# Patient Record
Sex: FEMALE | Race: WHITE | Employment: UNEMPLOYED | URBAN - METROPOLITAN AREA
[De-identification: names, ages, dates, MRNs, and addresses within clinical notes are randomized per-mention and may not be internally consistent; named-entity substitution may affect disease eponyms.]

---

## 2017-01-16 ENCOUNTER — TELEPHONE (OUTPATIENT)
Dept: FAMILY MEDICINE | Facility: CLINIC | Age: 62
End: 2017-01-16

## 2017-01-16 DIAGNOSIS — Z12.11 COLON CANCER SCREENING: Primary | ICD-10-CM

## 2017-01-16 NOTE — TELEPHONE ENCOUNTER
Panel Management Review      Patient has the following on her problem list:     Depression / Dysthymia review  PHQ-9 SCORE 6/5/2012 8/7/2012 6/23/2014   Total Score 12 16 8      Patient is due for:  PHQ9      Composite cancer screening  Chart review shows that this patient is due/due soon for the following Colonoscopy and Fecal Colorectal (FIT)  Summary:    Patient is due/failing the following:   COLONOSCOPY and FIT    Action needed:   Patient needs referral/order: GI referral placed.      Type of outreach:    NONE.  GI referral placed.  They will call patient.      Questions for provider review:    None                                                                                                                                    Lisa Magill, HERMILA

## 2017-03-01 ENCOUNTER — OFFICE VISIT (OUTPATIENT)
Dept: FAMILY MEDICINE | Facility: CLINIC | Age: 62
End: 2017-03-01
Payer: OTHER MISCELLANEOUS

## 2017-03-01 VITALS
HEIGHT: 66 IN | RESPIRATION RATE: 16 BRPM | BODY MASS INDEX: 38.57 KG/M2 | SYSTOLIC BLOOD PRESSURE: 108 MMHG | TEMPERATURE: 97.7 F | DIASTOLIC BLOOD PRESSURE: 62 MMHG | HEART RATE: 90 BPM | WEIGHT: 240 LBS | OXYGEN SATURATION: 96 %

## 2017-03-01 DIAGNOSIS — M54.42 ACUTE BILATERAL LOW BACK PAIN WITH LEFT-SIDED SCIATICA: Primary | ICD-10-CM

## 2017-03-01 PROCEDURE — 99213 OFFICE O/P EST LOW 20 MIN: CPT | Performed by: NURSE PRACTITIONER

## 2017-03-01 NOTE — PROGRESS NOTES
"HPI    SUBJECTIVE:                                                    Rich Terry is a 62 year old female who presents to clinic today for the following health issues:      Back Pain      Duration: started 2/23/17        Specific cause: fall , work-related    Description:   Location of pain: low back left and middle of back left  Character of pain: stabbing and constant  Pain radiation:radiates into the left buttocks and radiates into the left leg  New numbness or weakness in legs, not attributed to pain:  YES    Intensity: Currently 4-5/10, At its worst 10/10    History:   Pain interferes with job: YES  History of back problems: recurrent self limited episodes of low back pain in the past. Has not had back pain prior to this for a long time.   Any previous MRI or X-rays: None  Sees a specialist for back pain:  No  Therapies tried without relief: cold, heat and NSAIDs    Alleviating factors:   Improved by: none      Precipitating factors:  Worsened by: Bending    Functional and Psychosocial Screen (Riri STarT Back):      Most recent score:    RIRI START BACK TOTAL SCORE 3/1/2017   Total Score (all 9) 2          Accompanying Signs & Symptoms:  Risk of Fracture:  None  Risk of Cauda Equina:  None  Risk of Infection:  None  Risk of Cancer:  None  Risk of Ankylosing Spondylitis:  Onset at age <35, male, AND morning back stiffness. no          Patient also having pain in right arm. \"feels like pulled muscle\".  Works in a greenhouse.  Tripped over a board while in the greenhouse, fell, and landed on L side.  Tried ibuprofen and Aleve for 2 days; some improvement.  Has been working since the fall.  Working 11 days in a row, 8 hour days.  Work consists of lots of bending over and some heavy lifting.         Problem list and histories reviewed & adjusted, as indicated.  Additional history: as documented    No current outpatient prescriptions on file.     Allergies   Allergen Reactions     Levaquin      Shrimp      " "throat swells     Sulfa Drugs Rash     rash       Reviewed and updated as needed this visit by clinical staff       Reviewed and updated as needed this visit by Provider         ROS:  Constitutional, HEENT, cardiovascular, pulmonary, gi and gu systems are negative, except as otherwise noted.    OBJECTIVE:                                                    /62 (BP Location: Right arm, Patient Position: Chair, Cuff Size: Adult Large)  Pulse 90  Temp 97.7  F (36.5  C) (Oral)  Resp 16  Ht 5' 6\" (1.676 m)  Wt 240 lb (108.9 kg)  LMP 09/11/2005  SpO2 96%  BMI 38.74 kg/m2  Body mass index is 38.74 kg/(m^2).  GENERAL: healthy, alert and no distress  RESP: lungs clear to auscultation - no rales, rhonchi or wheezes  CV: regular rate and rhythm, normal S1 S2, no S3 or S4, no murmur, click or rub  MS: Back exam: no obvious deformities noted, normal muscle tone, normal range of motion, R and L paralumbar tenderness, normal straight leg raises, and gait normal including heel-toe walking  SKIN: no suspicious lesions or rashes  NEURO: Normal strength and tone, mentation intact and speech normal  PSYCH: mentation appears normal, affect normal/bright    Diagnostic Test Results:  none      ASSESSMENT/PLAN:                                                      1. Acute bilateral low back pain with left-sided sciatica  Rest, ice/heat, massage, tylenol/ibuprofen  - [facility], [facility] CH[facility]RACTIC REFERRAL    F/u in 1 wk; sooner as needed     DUTCH Polanco [facility]      ROS      Physical Exam      "

## 2017-03-01 NOTE — NURSING NOTE
"Chief Complaint   Patient presents with     Back Pain     Musculoskeletal Problem     right arm pain       Initial /62 (BP Location: Right arm, Patient Position: Chair, Cuff Size: Adult Large)  Pulse 90  Temp 97.7  F (36.5  C) (Oral)  Resp 16  Ht 5' 6\" (1.676 m)  Wt 240 lb (108.9 kg)  LMP 09/11/2005  SpO2 96%  BMI 38.74 kg/m2 Estimated body mass index is 38.74 kg/(m^2) as calculated from the following:    Height as of this encounter: 5' 6\" (1.676 m).    Weight as of this encounter: 240 lb (108.9 kg).  Medication Reconciliation: complete   Niya Abrams MA    "

## 2017-03-01 NOTE — MR AVS SNAPSHOT
After Visit Summary   3/1/2017    Rich Terry    MRN: [id number]           Patient Information     Date Of Birth          1955        Visit Information        Provider Department      3/1/2017 10:40 AM Brittny Fraire APRN CNP [facility]        Today's Diagnoses     Acute bilateral low back pain with left-sided sciatica    -  1       Follow-ups after your visit        Additional Services     [facility], [facility]RACT[facility] REFERRAL       **This order will print in the [facility] Scheduling Office**    Physical Therapy, Hand Therapy and Chiropractic Care are available through:    *[facility]  *[facility]  *[facility]    Call one number to schedule at any of the above locations: [phone].    Your provider has referred you to: Physical Therapy at [facility] or [facility]    Indication/Reason for Referral: Low Back Pain  Onset of Illness: 2/23/2017  Therapy Orders: Evaluate and Treat  Special Programs: None  Special Request: None    Riri Sub-Score (Q5-9): 1 (03/01/17 105)  Riri Total Score (all 9): 2 (03/01/17 5457)    Additional Comments for the Therapist or Chiropractor:     Please be aware that coverage of these services is subject to the terms and limitations of your health insurance plan.  Call member services at your health plan with any benefit or coverage questions.      Please bring the following to your appointment:    *Your personal calendar for scheduling future appointments  *Comfortable clothing                  Who to contact     If you have questions or need follow up information about today's clinic visit or your schedule please contact [facility] directly at [phone].  Normal or non-critical lab and imaging results will be communicated to you by MyChart, letter or phone within 4 business days after the clinic has received the results. If you do not hear from us within 7 days, please contact  "the clinic through [employer] or phone. If you have a critical or abnormal lab result, we will notify you by phone as soon as possible.  Submit refill requests through [employer] or call your pharmacy and they will forward the refill request to us. Please allow 3 business days for your refill to be completed.          Additional Information About Your Visit        [employer] Information     [employer] lets you send messages to your doctor, view your test results, renew your prescriptions, schedule appointments and more. To sign up, go to [email or web address] . Click on \"Log in\" on the left side of the screen, which will take you to the Welcome page. Then click on \"Sign up Now\" on the right side of the page.     You will be asked to enter the access code listed below, as well as some personal information. Please follow the directions to create your username and password.     Your access code is: [email or web address]  Expires: 2017 11:17 AM     Your access code will  in 90 days. If you need help or a new code, please call your [place] clinic or [phone].        Care EveryWhere ID     This is your Care EveryWhere ID. This could be used by other organizations to access your [place] medical records  [id number]        Your Vitals Were     Pulse Temperature Respirations Height Last Period Pulse Oximetry    90 97.7  F (36.5  C) (Oral) 16 5' 6\" (1.676 m) 2005 96%    BMI (Body Mass Index)                   38.74 kg/m2            Blood Pressure from Last 3 Encounters:   17 108/62   05/10/16 118/78   04/15/16 108/70    Weight from Last 3 Encounters:   17 240 lb (108.9 kg)   05/10/16 234 lb (106.1 kg)   04/15/16 237 lb (107.5 kg)              We Performed the Following     [facility], [facility]CT[facility] REFERRAL        Primary Care Provider Office Phone # Fax #    Ragini Stevens [phone]       [place] [address]  [address]   [facility] [address]        Thank you!     " Thank you for choosing [facility]  for your care. Our goal is always to provide you with excellent care. Hearing back from our patients is one way we can continue to improve our services. Please take a few minutes to complete the written survey that you may receive in the mail after your visit with us. Thank you!             Your Updated Medication List - Protect others around you: Learn how to safely use, store and throw away your medicines at www.disposemymeds.org.      Notice  As of 3/1/2017 11:17 AM    You have not been prescribed any medications.

## 2017-03-01 NOTE — LETTER
[address], [address]  [facility] [address]  Phone: [phone]  Fax: [phone]    March 1, 2017        Rich Terry  [address] [address]          To whom it may concern:    RE: Rich FELICITA Harrison    Patient may return to work with the following:  Light duty-unable to lift more than 10 pounds.      Please contact me for questions or concerns.      Sincerely,        DUTCH Polanco CNP

## 2017-03-01 NOTE — LETTER
[address], [address]  [facility] [address]  Phone: [phone]  Fax: [phone]    March 1, 2017        Rich Terry  [address] [address]          To whom it may concern:    RE: Rich Terry    Patient may return to work with the following:  Light duty-unable to lift more than 10 pounds for one week.        Sincerely,        DUTCH Polanco CNP

## 2017-03-09 ENCOUNTER — TELEPHONE (OUTPATIENT)
Dept: FAMILY MEDICINE | Facility: CLINIC | Age: 62
End: 2017-03-09

## 2017-03-09 ENCOUNTER — OFFICE VISIT (OUTPATIENT)
Dept: FAMILY MEDICINE | Facility: CLINIC | Age: 62
End: 2017-03-09
Payer: OTHER MISCELLANEOUS

## 2017-03-09 ENCOUNTER — RADIANT APPOINTMENT (OUTPATIENT)
Dept: GENERAL RADIOLOGY | Facility: CLINIC | Age: 62
End: 2017-03-09
Attending: FAMILY MEDICINE
Payer: OTHER MISCELLANEOUS

## 2017-03-09 VITALS
TEMPERATURE: 98.2 F | RESPIRATION RATE: 12 BRPM | BODY MASS INDEX: 38.48 KG/M2 | WEIGHT: 238.4 LBS | OXYGEN SATURATION: 96 % | DIASTOLIC BLOOD PRESSURE: 76 MMHG | HEART RATE: 80 BPM | SYSTOLIC BLOOD PRESSURE: 102 MMHG

## 2017-03-09 DIAGNOSIS — Z12.11 SPECIAL SCREENING FOR MALIGNANT NEOPLASMS, COLON: Primary | ICD-10-CM

## 2017-03-09 DIAGNOSIS — M54.42 ACUTE BILATERAL LOW BACK PAIN WITH LEFT-SIDED SCIATICA: Primary | ICD-10-CM

## 2017-03-09 DIAGNOSIS — M54.42 ACUTE BILATERAL LOW BACK PAIN WITH LEFT-SIDED SCIATICA: ICD-10-CM

## 2017-03-09 DIAGNOSIS — M79.621 PAIN OF RIGHT UPPER ARM: ICD-10-CM

## 2017-03-09 PROCEDURE — 99214 OFFICE O/P EST MOD 30 MIN: CPT | Performed by: FAMILY MEDICINE

## 2017-03-09 PROCEDURE — 72100 X-RAY EXAM L-S SPINE 2/3 VWS: CPT

## 2017-03-09 RX ORDER — PREDNISONE 20 MG/1
40 TABLET ORAL DAILY
Qty: 10 TABLET | Refills: 0 | Status: SHIPPED | OUTPATIENT
Start: 2017-03-09 | End: 2017-03-14

## 2017-03-09 RX ORDER — METHOCARBAMOL 750 MG/1
750 TABLET, FILM COATED ORAL 4 TIMES DAILY PRN
Qty: 60 TABLET | Refills: 1 | Status: SHIPPED | OUTPATIENT
Start: 2017-03-09 | End: 2017-07-10

## 2017-03-09 RX ORDER — HYDROCODONE BITARTRATE AND ACETAMINOPHEN 5; 325 MG/1; MG/1
1-2 TABLET ORAL EVERY 4 HOURS PRN
Qty: 30 TABLET | Refills: 0 | Status: SHIPPED | OUTPATIENT
Start: 2017-03-09 | End: 2017-04-06

## 2017-03-09 ASSESSMENT — PAIN SCALES - GENERAL: PAINLEVEL: MODERATE PAIN (4)

## 2017-03-09 NOTE — LETTER
REPORT OF WORK ABILITY    [facility]   [facility] [address], [address]  [facility] [id number] [phone]      PATIENT DATA    Employee Name: Rich Terry      : 1955     #: [id number]    Work related injury: Yes  Employer at time of injury: [employer]  Employer contact & phone:   Employed elsewhere? No  Workers' Compensation Carrier/Managed Care Plan:  unknown    Today's date: 3/9/2017  Date of injury: 17  Date of first visit: 3/1/17    PROVIDER EVALUATION: Please fill in as needed.  Please give copy to employee for employer.    1. Diagnosis: sciatica on the left and right arm pain    2. Treatment: medication, x-rays.  3. Medication: robaxin, prednisone, vicodin  NOTE: When ordering a medication, [place] Rules require Work Comp or WC on prescriptions.    4. No work from -n/a.  5. Return to work date: already    ** WITH RESTRICTIONS? Yes, with work restrictions: * Restricted lifting - Maximum lift in pounds: 50  * Restricted bending-bending limit : limit past 90 degrees.  Bending frequency (5 times per day)  DURATION OF LIMITATIONS: 17      RESTRICTIONS: Unlimited unless listed.  Restrictions apply to home and leisure also.  If work restrictions is not available, the employee is totally disabled.    Maximum Medical Improvement (Date): 17  Any Permanent Partial Disability? Deferred to future exam/consult.    Provider comments:     Medical Examiner: [facility], MD                License or registration: [id number]    Next appointment: 2 weeks    CC: Employer, Managed Care Plan/Payor, Patient

## 2017-03-09 NOTE — PROGRESS NOTES
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 Additionally, another coworker got injured on the job. She states she is retiring and her last day is on May 1st, but she may continue to work because they are requesting she stay on part-time.     Date of injury:     02/23/17    Injury location:    Right arm, low back, and left buttock.     Injury Event:  While she was working on 2/23/2017, she tripped on a loose board that was hanging off of a broken pallet that another coworker placed next to a bench, creating a narrow pathway that she needed to walk through. She tripped on that loose board and fell forward, injuring her right arm, lower back, and her left buttock. Although painful, she did not leave work early due to the injury, but when she complained of back pain, her employer told her to wait and see how she felt the next day. However, she has Fridays off, so she was unable to follow-up with her employer the next day. The following Tuesday, she went in and requested paperwork for work compensation. Her company has not replied to her claim yet.    Current Symptoms:  Always a pressure feeling in her left buttock. At times, she experiences a very sharp - almost like a stabbing pain - in her lower back after any gentle movements, especially while rotating her upper body. This sharp pain eventually becomes a dull ache. Although certain movements provokes her symptoms, she states she always has a numbness in the left buttock. She has tried using heat/cold therapy as well as Ibuprofen, but has found minimal relief.      Problem list and histories reviewed & adjusted, as indicated.  Additional history: as documented      BP Readings from Last 3 Encounters:    03/09/17 102/76   03/01/17 108/62   05/10/16 118/78    Wt Readings from Last 3 Encounters:   03/09/17 238 lb 6.4 oz (108.1 kg)   03/01/17 240 lb (108.9 kg)   05/10/16 234 lb (106.1 kg)         Labs reviewed in EPIC    ROS:  Constitutional, HEENT, cardiovascular, pulmonary, gi and gu systems are negative, except as otherwise noted.    This document serves as a record of the services and decisions personally performed and made by Ragini Stevens MD. It was created on her behalf by Sharon Patricia, a trained medical scribe. The creation of this document is based on the provider's statements to the medical scribe.  Sharon Patricia, 11:12 AM, March 9, 2017    OBJECTIVE:                                                    /76 (BP Location: Right arm, Patient Position: Chair, Cuff Size: Adult Large)  Pulse 80  Temp 98.2  F (36.8  C) (Oral)  Resp 12  Wt 238 lb 6.4 oz (108.1 kg)  LMP 09/11/2005  SpO2 96%  BMI 38.48 kg/m2  Body mass index is 38.48 kg/(m^2).    EXAM:  GENERAL: Patient appears healthy, alert and no distress  NECK: No adenopathy, no asymmetry, masses, or scars and thyroid normal to palpation  RESP: Lungs clear to auscultation - no rales, rhonchi or wheezes  CV: Regular rate and rhythm, normal S1 S2, no S3 or S4, no murmur, click or rub, no peripheral edema and peripheral pulses strong  ABDOMEN: Soft, nontender, no hepatosplenomegaly, no masses and bowel sounds normal  MS: Tenderness over the left piriformis and gluteus camryn muscles,Tenderness of left buttock with straight leg test; No edema or altered gait  Upper buttock pain also noted on palpation, near iliac crest  Reflexes are normal bilt, strength normal in LE and UE  Right arm examined and no pain to palpation, no other findings      Diagnostic Test Results:  No results found for this or any previous visit (from the past 24 hour(s)).     ASSESSMENT/PLAN:                                                    (M54.42) Acute bilateral low back pain with  left-sided sciatica  (primary encounter diagnosis)  Comment: Injured her back after bracing herself from a fall, where she tripped on a loose board of a pallet; Braced herself to protect her knees and injured her right arm and lower back as a result; Pain located in her lower left back with radiation into her left buttock; At times, it can be a sharp, shooting pain that she rates as a 10/10 in severity, then it transitions into a dull ache; X-ray today to check her alignment and for any fractures or other acute injuries; Started on Norco and Robaxin for pain rellief as well as Prednisone for anti-inflammation; Relief of her pain should also help her sleep, something she states she has been unable to do because of the pain; Sent the patient home with a letter regarding her work restrictions.  Plan: XR Lumbar Spine 2/3 Views,         HYDROcodone-acetaminophen (NORCO) 5-325 MG per -take at night only        tablet, predniSONE (DELTASONE) 20 MG tablet,         methocarbamol (ROBAXIN) 750 MG tablet          (M79.621) Pain of right upper arm  Comment: Injured her right arm after bracing herself from a fall, where she tripped on a loose board of a pallet; Although her arm is painful, she states it is mild in comparison to her lower back; Started on Norco for pain relief, which should also help her sleep since she has not been able to sleep due to pain.  Plan: HYDROcodone-acetaminophen (NORCO) 5-325 MG per tablet              The information in this document, created by a medical scribe for me, accurately reflects the services I personally performed and the decisions made by me. I have reviewed and approved this document for accuracy.  Dr. Ragini Stevens, 11:30 AM, March 9, 2017    Ragini Stevens MD  [facility]

## 2017-03-09 NOTE — NURSING NOTE
"Chief Complaint   Patient presents with     Work Comp     Initial /76 (BP Location: Right arm, Patient Position: Chair, Cuff Size: Adult Large)  Pulse 80  Temp 98.2  F (36.8  C) (Oral)  Resp 12  Wt 238 lb 6.4 oz (108.1 kg)  LMP 09/11/2005  SpO2 96%  BMI 38.48 kg/m2 Estimated body mass index is 38.48 kg/(m^2) as calculated from the following:    Height as of 3/1/17: 5' 6\" (1.676 m).    Weight as of this encounter: 238 lb 6.4 oz (108.1 kg).  BP completed using cuff size large right arm.    Lisa Magill, CMA    "

## 2017-03-09 NOTE — MR AVS SNAPSHOT
"              After Visit Summary   3/9/2017    Rich Terry    MRN: [id number]           Patient Information     Date Of Birth          1955        Visit Information        Provider Department      3/9/2017 11:00 AM Ragini Stevens MD [facility]        Today's Diagnoses     Acute bilateral low back pain with left-sided sciatica    -  1    Pain of right upper arm           Follow-ups after your visit        Future tests that were ordered for you today     Open Future Orders        Priority Expected Expires Ordered    XR Lumbar Spine 2/3 Views Routine 3/9/2017 3/9/2018 3/9/2017            Who to contact     If you have questions or need follow up information about today's clinic visit or your schedule please contact [facility] directly at [phone].  Normal or non-critical lab and imaging results will be communicated to you by MyChart, letter or phone within 4 business days after the clinic has received the results. If you do not hear from us within 7 days, please contact the clinic through MyChart or phone. If you have a critical or abnormal lab result, we will notify you by phone as soon as possible.  Submit refill requests through [email or web address] or call your pharmacy and they will forward the refill request to us. Please allow 3 business days for your refill to be completed.          Additional Information About Your Visit        MyChart Information     [email or web address] lets you send messages to your doctor, view your test results, renew your prescriptions, schedule appointments and more. To sign up, go to [email or web address] . Click on \"Log in\" on the left side of the screen, which will take you to the Welcome page. Then click on \"Sign up Now\" on the right side of the page.     You will be asked to enter the access code listed below, as well as some personal information. Please follow the directions to create your username and password.     Your access code is: " [email or web address]  Expires: 2017 11:17 AM     Your access code will  in 90 days. If you need help or a new code, please call your [place] clinic or [phone].        Care EveryWhere ID     This is your Care EveryWhere ID. This could be used by other organizations to access your [place] medical records  [id number]        Your Vitals Were     Pulse Temperature Respirations Last Period Pulse Oximetry BMI (Body Mass Index)    80 98.2  F (36.8  C) (Oral) 12 2005 96% 38.48 kg/m2       Blood Pressure from Last 3 Encounters:   17 102/76   17 108/62   05/10/16 118/78    Weight from Last 3 Encounters:   17 238 lb 6.4 oz (108.1 kg)   17 240 lb (108.9 kg)   05/10/16 234 lb (106.1 kg)                 Today's Medication Changes          These changes are accurate as of: 3/9/17 11:29 AM.  If you have any questions, ask your nurse or doctor.               Start taking these medicines.        Dose/Directions    HYDROcodone-acetaminophen 5-325 MG per tablet   Commonly known as:  NORCO   Used for:  Acute bilateral low back pain with left-sided sciatica, Pain of right upper arm   Started by:  Ragini Stevens MD        Dose:  1-2 tablet   Take 1-2 tablets by mouth every 4 hours as needed for moderate to severe pain maximum 2 tablet(s) per day   Quantity:  30 tablet   Refills:  0       methocarbamol 750 MG tablet   Commonly known as:  ROBAXIN   Used for:  Acute bilateral low back pain with left-sided sciatica   Started by:  Ragini Stevens MD        Dose:  750 mg   Take 1 tablet (750 mg) by mouth 4 times daily as needed for muscle spasms   Quantity:  60 tablet   Refills:  1       predniSONE 20 MG tablet   Commonly known as:  DELTASONE   Used for:  Acute bilateral low back pain with left-sided sciatica   Started by:  Ragini Stevens MD        Dose:  40 mg   Take 2 tablets (40 mg) by mouth daily for 5 days   Quantity:  10 tablet   Refills:  0            Where to get your  medicines      These medications were sent to [facility] - [facility] [address] AT [place] [address]  [address], [facility] [address]    Hours:  24-hours Phone:  [phone]     methocarbamol 750 MG tablet    predniSONE 20 MG tablet         Some of these will need a paper prescription and others can be bought over the counter.  Ask your nurse if you have questions.     Bring a paper prescription for each of these medications     HYDROcodone-acetaminophen 5-325 MG per tablet                Primary Care Provider Office Phone # Fax #    Ragini Stevens -[phone]       [facility] [address]  [place]   [facility] [address]        Thank you!     Thank you for choosing [facility]  for your care. Our goal is always to provide you with excellent care. Hearing back from our patients is one way we can continue to improve our services. Please take a few minutes to complete the written survey that you may receive in the mail after your visit with us. Thank you!             Your Updated Medication List - Protect others around you: Learn how to safely use, store and throw away your medicines at www.disposemymeds.org.          This list is accurate as of: 3/9/17 11:29 AM.  Always use your most recent med list.                   Brand Name Dispense Instructions for use    HYDROcodone-acetaminophen 5-325 MG per tablet    NORCO    30 tablet    Take 1-2 tablets by mouth every 4 hours as needed for moderate to severe pain maximum 2 tablet(s) per day       methocarbamol 750 MG tablet    ROBAXIN    60 tablet    Take 1 tablet (750 mg) by mouth 4 times daily as needed for muscle spasms       predniSONE 20 MG tablet    DELTASONE    10 tablet    Take 2 tablets (40 mg) by mouth daily for 5 days

## 2017-03-24 ENCOUNTER — TELEPHONE (OUTPATIENT)
Dept: GASTROENTEROLOGY | Facility: CLINIC | Age: 62
End: 2017-03-24

## 2017-03-24 NOTE — TELEPHONE ENCOUNTER
Third attempt to reach patient to schedule Colonoscopy. Not Scheduled at [facility]. Patient said she will call back when she is ready to Schedule.

## 2017-04-06 ENCOUNTER — OFFICE VISIT (OUTPATIENT)
Dept: FAMILY MEDICINE | Facility: CLINIC | Age: 62
End: 2017-04-06
Payer: OTHER MISCELLANEOUS

## 2017-04-06 VITALS
RESPIRATION RATE: 16 BRPM | BODY MASS INDEX: 38.69 KG/M2 | DIASTOLIC BLOOD PRESSURE: 80 MMHG | SYSTOLIC BLOOD PRESSURE: 132 MMHG | TEMPERATURE: 98 F | HEART RATE: 85 BPM | WEIGHT: 239.7 LBS | OXYGEN SATURATION: 99 %

## 2017-04-06 DIAGNOSIS — S43.421A SPRAIN OF RIGHT ROTATOR CUFF CAPSULE, INITIAL ENCOUNTER: ICD-10-CM

## 2017-04-06 DIAGNOSIS — M54.42 ACUTE BILATERAL LOW BACK PAIN WITH LEFT-SIDED SCIATICA: ICD-10-CM

## 2017-04-06 DIAGNOSIS — M54.42 LOW BACK PAIN WITH RADIATION, LEFT: Primary | ICD-10-CM

## 2017-04-06 PROCEDURE — 99214 OFFICE O/P EST MOD 30 MIN: CPT | Performed by: FAMILY MEDICINE

## 2017-04-06 RX ORDER — HYDROCODONE BITARTRATE AND ACETAMINOPHEN 5; 325 MG/1; MG/1
1-2 TABLET ORAL EVERY 4 HOURS PRN
Qty: 45 TABLET | Refills: 0 | Status: SHIPPED | OUTPATIENT
Start: 2017-04-06 | End: 2017-07-10

## 2017-04-06 ASSESSMENT — PAIN SCALES - GENERAL: PAINLEVEL: MODERATE PAIN (5)

## 2017-04-06 NOTE — MR AVS SNAPSHOT
"              After Visit Summary   4/6/2017    Rich Terry    MRN: [id number]           Patient Information     Date Of Birth          1955        Visit Information        Provider Department      4/6/2017 3:40 PM Ragini Stevens MD [facility]        Today's Diagnoses     Low back pain with radiation, left    -  1    Acute bilateral low back pain with left-sided sciatica        Sprain of right rotator cuff capsule, initial encounter           Follow-ups after your visit        Future tests that were ordered for you today     Open Future Orders        Priority Expected Expires Ordered    MR Lumbar Spine w/o Contrast Routine  4/6/2018 4/6/2017            Who to contact     If you have questions or need follow up information about today's clinic visit or your schedule please contact [facility] directly at [phone].  Normal or non-critical lab and imaging results will be communicated to you by MyChart, letter or phone within 4 business days after the clinic has received the results. If you do not hear from us within 7 days, please contact the clinic through MyChart or phone. If you have a critical or abnormal lab result, we will notify you by phone as soon as possible.  Submit refill requests through [employer] or call your pharmacy and they will forward the refill request to us. Please allow 3 business days for your refill to be completed.          Additional Information About Your Visit        Snapflowhart Information     Rewardpod lets you send messages to your doctor, view your test results, renew your prescriptions, schedule appointments and more. To sign up, go to [email or web address] . Click on \"Log in\" on the left side of the screen, which will take you to the Welcome page. Then click on \"Sign up Now\" on the right side of the page.     You will be asked to enter the access code listed below, as well as some personal information. Please follow the directions to " create your username and password.     Your access code is: [email or web address]  Expires: 2017 12:17 PM     Your access code will  in 90 days. If you need help or a new code, please call your [facility] or [phone].        Care EveryWhere ID     This is your Care EveryWhere ID. This could be used by other organizations to access your [place] medical records  [id number]        Your Vitals Were     Pulse Temperature Respirations Last Period Pulse Oximetry BMI (Body Mass Index)    85 98  F (36.7  C) (Oral) 16 2005 99% 38.69 kg/m2       Blood Pressure from Last 3 Encounters:   17 132/80   17 102/76   17 108/62    Weight from Last 3 Encounters:   17 239 lb 11.2 oz (108.7 kg)   17 238 lb 6.4 oz (108.1 kg)   17 240 lb (108.9 kg)                 Where to get your medicines      Some of these will need a paper prescription and others can be bought over the counter.  Ask your nurse if you have questions.     Bring a paper prescription for each of these medications     HYDROcodone-acetaminophen 5-325 MG per tablet          Primary Care Provider Office Phone # Fax #    Ragini Stevens [phone]       [facility] [address]  [place]   [facility] [address]        Thank you!     Thank you for choosing [facility]  for your care. Our goal is always to provide you with excellent care. Hearing back from our patients is one way we can continue to improve our services. Please take a few minutes to complete the written survey that you may receive in the mail after your visit with us. Thank you!             Your Updated Medication List - Protect others around you: Learn how to safely use, store and throw away your medicines at www.disposemymeds.org.          This list is accurate as of: 17  4:30 PM.  Always use your most recent med list.                   Brand Name Dispense Instructions for use    HYDROcodone-acetaminophen  5-325 MG per tablet    NORCO    45 tablet    Take 1-2 tablets by mouth every 4 hours as needed for moderate to severe pain maximum 2 tablet(s) per day       methocarbamol 750 MG tablet    ROBAXIN    60 tablet    Take 1 tablet (750 mg) by mouth 4 times daily as needed for muscle spasms

## 2017-04-06 NOTE — PROGRESS NOTES
HPI   SUBJECTIVE:                                                    Rich Terry is a 62 year old female who presents to clinic today for the following health issues:    WORK COMP:    Low back pain doesn't seem to be getting any better.  Ongoing pressure feeling in left butt cheek area.  Right upper arm pain with certain movements.   Taking 1-2 vicodin at bedtime, cant sleep well without it. Will sometimes take 1 with the muscle relaxant and this helps some. Will wake up in pain, but does not take another, has to work the next day, never takes anything at work. tough to be at work      Working until end of May    Upper arm pain, started after the fall, caught her self with the right arm, painful to lift the arm up    PROBLEMS TO ADD ON...    Problem list and histories reviewed & adjusted, as indicated.  Additional history: as documented    BP Readings from Last 3 Encounters:   04/06/17 132/80   03/09/17 102/76   03/01/17 108/62    Wt Readings from Last 3 Encounters:   04/06/17 239 lb 11.2 oz (108.7 kg)   03/09/17 238 lb 6.4 oz (108.1 kg)   03/01/17 240 lb (108.9 kg)                  Labs reviewed in EPIC    Reviewed and updated as needed this visit by clinical staff  Tobacco  Allergies  Meds  Problems  Med Hx  Surg Hx  Fam Hx  Soc Hx        Reviewed and updated as needed this visit by Provider         ROS:  Constitutional, HEENT, cardiovascular, pulmonary, gi and gu systems are negative, except as otherwise noted.    OBJECTIVE:                                                    /80 (BP Location: Right arm, Patient Position: Chair, Cuff Size: Adult Regular)  Pulse 85  Temp 98  F (36.7  C) (Oral)  Resp 16  Wt 239 lb 11.2 oz (108.7 kg)  LMP 09/11/2005  SpO2 99%  BMI 38.69 kg/m2  Body mass index is 38.69 kg/(m^2).  GENERAL: healthy, alert and no distress  NECK: no adenopathy, no asymmetry, masses, or scars and thyroid normal to palpation  RESP: lungs clear to auscultation - no rales, rhonchi or  wheezes  CV: regular rate and rhythm, normal S1 S2, no S3 or S4, no murmur, click or rub, no peripheral edema and peripheral pulses strong  MS: no gross musculoskeletal defects noted, no edema  BACK: no CVA tenderness, no paralumbar tenderness  Comprehensive back pain exam:  Tenderness of the lumbar spine bilt, Pain limits the following motions: bending forward, due to pain, Lower extremity strength functional and equal on both sides, Lower extremity reflexes within normal limits bilaterally, Lower extremity sensation normal and equal on both sides and Straight leg raise negative bilaterally  Shoulder exam, painful, but FROM of both shoulders, upper lateral upper arm pain with pressure  Diagnostic Test Results:  none      ASSESSMENT/PLAN:                                                              ICD-10-CM    1. Low back pain with radiation, left M54.42 MR Lumbar Spine w/o Contrast   2. Acute bilateral low back pain with left-sided sciatica M54.42 HYDROcodone-acetaminophen (NORCO) 5-325 MG per tablet   3. Sprain of right rotator cuff capsule, initial encounter S43.421A Ice, rest, pain meds, offered PT, pt will do her own exercises for now,        Follow up after MRI  Limited bending, lifting, stooping    Ragini Stevens MD  [facility]      Physical Exam

## 2017-04-13 ENCOUNTER — HOSPITAL ENCOUNTER (OUTPATIENT)
Dept: MRI IMAGING | Facility: CLINIC | Age: 62
Discharge: HOME | End: 2017-04-13
Attending: FAMILY MEDICINE | Admitting: FAMILY MEDICINE
Payer: OTHER MISCELLANEOUS

## 2017-04-13 DIAGNOSIS — M54.42 LOW BACK PAIN WITH RADIATION, LEFT: ICD-10-CM

## 2017-04-13 PROCEDURE — 72148 MRI LUMBAR SPINE W/O DYE: CPT

## 2017-04-17 ENCOUNTER — TELEPHONE (OUTPATIENT)
Dept: FAMILY MEDICINE | Facility: CLINIC | Age: 62
End: 2017-04-17

## 2017-04-17 NOTE — TELEPHONE ENCOUNTER
Notes Recorded by Ragini Stevens MD on 4/17/2017 at 12:15 PM  Please call pt with the results. Degenerative changes seen, but no disc herniatation. Some stenosis as well, but not pushing on any nerves, which is great news    LMOM for patient to call clinic back.  Jahaira Cordero RN

## 2017-05-01 NOTE — PROGRESS NOTES
HPI   SUBJECTIVE:                                                    Rich Terry is a 62 year old female who presents to clinic today for the following health issues:      WORK COMP:  Rich is here today for a workers comp injury. Notes that her back pain is better but her right arm is worse. Notes that it is difficult to unhook bra at night, lift arm above head. Describes the pain as in the front of the arm and up into the surgery. Notes it was difficult to lift a six inch wet pot without pain. Denies any weakness, but just issues with pain. Pain is most apparent when putting arm behind back. Is having issues sleeping as she usually sleeps on this right side.     Internal rotation---    Limited ROM bheind    Problem list and histories reviewed & adjusted, as indicated.  Additional history: as documented    BP Readings from Last 3 Encounters:   05/02/17 142/80   04/06/17 132/80   03/09/17 102/76    Wt Readings from Last 3 Encounters:   05/02/17 110 kg (242 lb 9.6 oz)   04/06/17 108.7 kg (239 lb 11.2 oz)   03/09/17 108.1 kg (238 lb 6.4 oz)                  Labs reviewed in EPIC    Reviewed and updated as needed this visit by clinical staff  Tobacco  Allergies  Meds  Problems  Med Hx  Surg Hx  Fam Hx  Soc Hx        Reviewed and updated as needed this visit by Provider         ROS:  Constitutional, HEENT, cardiovascular, pulmonary, gi and gu systems are negative, except as otherwise noted.    This document serves as a record of the services and decisions personally performed and made by Ragini Stevens MD. It was created on her behalf by Myla Aguirre, a trained medical scribe. The creation of this document is based the provider's statements to the medical scribe.  Myla Aguirre May 2, 2017 10:49 AM    OBJECTIVE:                                                    /80 (BP Location: Right arm, Patient Position: Chair, Cuff Size: Adult Large)  Pulse 90  Temp 97.9  F (36.6  C) (Oral)  Resp 16  Wt 110 kg  (242 lb 9.6 oz)  LMP 09/11/2005  SpO2 98%  BMI 39.16 kg/m2  Body mass index is 39.16 kg/(m^2).  GENERAL: healthy, alert and no distress  EYES: Eyes grossly normal to inspection, PERRL and conjunctivae and sclerae normal  HENT: ear canals and TM's normal, nose and mouth without ulcers or lesions  NECK: no adenopathy, no asymmetry, masses, or scars and thyroid normal to palpation  MS: Limited internal ROM right shoulder, appears somewhat weak on strength , but finger strength normal, empty can test normal, across the chest is normal but hurts some upper arm only  SKIN: no suspicious lesions or rashes  NEURO: Normal strength and tone, mentation intact and speech normal  PSYCH: mentation appears normal, affect normal/bright    Diagnostic Test Results:  Xray - right shoulder.      ASSESSMENT/PLAN:                                                        (M25.055) Acute pain of right shoulder  (primary encounter diagnosis)  Comment: Will have XR completed. Discussed possible arthritis due to pain with internal rotation. Also discussed possibility of rotator cuff injury. Will have radiology over read XR and follow up with results. Discussed sports med referral as the next step.   Plan: XR Shoulder Right G/E 3 Views     Follow up per XR results or as needed for referral.          The information in this document, created by the medical scribe for me, accurately reflects the services I personally performed and the decisions made by me. I have reviewed and approved this document for accuracy prior to leaving the patient care area.  Ragini Stevens MD 10:49 AM 5/2/2017          Ragini Stevens MD  [facility]      Physical Exam

## 2017-05-02 ENCOUNTER — OFFICE VISIT (OUTPATIENT)
Dept: FAMILY MEDICINE | Facility: CLINIC | Age: 62
End: 2017-05-02
Payer: OTHER MISCELLANEOUS

## 2017-05-02 ENCOUNTER — RADIANT APPOINTMENT (OUTPATIENT)
Dept: GENERAL RADIOLOGY | Facility: CLINIC | Age: 62
End: 2017-05-02
Attending: FAMILY MEDICINE
Payer: OTHER MISCELLANEOUS

## 2017-05-02 VITALS
TEMPERATURE: 97.9 F | SYSTOLIC BLOOD PRESSURE: 142 MMHG | OXYGEN SATURATION: 98 % | HEART RATE: 90 BPM | RESPIRATION RATE: 16 BRPM | WEIGHT: 242.6 LBS | DIASTOLIC BLOOD PRESSURE: 80 MMHG | BODY MASS INDEX: 39.16 KG/M2

## 2017-05-02 DIAGNOSIS — M25.511 ACUTE PAIN OF RIGHT SHOULDER: ICD-10-CM

## 2017-05-02 DIAGNOSIS — M25.511 ACUTE PAIN OF RIGHT SHOULDER: Primary | ICD-10-CM

## 2017-05-02 PROCEDURE — 73030 X-RAY EXAM OF SHOULDER: CPT | Mod: RT

## 2017-05-02 PROCEDURE — 99213 OFFICE O/P EST LOW 20 MIN: CPT | Performed by: FAMILY MEDICINE

## 2017-05-02 ASSESSMENT — PAIN SCALES - GENERAL: PAINLEVEL: MILD PAIN (3)

## 2017-05-02 NOTE — MR AVS SNAPSHOT
"              After Visit Summary   2017    Rich Terry    MRN: [id number]           Patient Information     Date Of Birth          1955        Visit Information        Provider Department      2017 10:20 AM Ragini Stevens MD [facility]        Today's Diagnoses     Acute pain of right shoulder    -  1       Follow-ups after your visit        Who to contact     If you have questions or need follow up information about today's clinic visit or your schedule please contact [facility] directly at [phone].  Normal or non-critical lab and imaging results will be communicated to you by MyAcademicProgramhart, letter or phone within 4 business days after the clinic has received the results. If you do not hear from us within 7 days, please contact the clinic through MyAcademicProgramhart or phone. If you have a critical or abnormal lab result, we will notify you by phone as soon as possible.  Submit refill requests through [employer] or call your pharmacy and they will forward the refill request to us. Please allow 3 business days for your refill to be completed.          Additional Information About Your Visit        MyChart Information     Cl[employer] Horse lets you send messages to your doctor, view your test results, renew your prescriptions, schedule appointments and more. To sign up, go to [email or web address] . Click on \"Log in\" on the left side of the screen, which will take you to the Welcome page. Then click on \"Sign up Now\" on the right side of the page.     You will be asked to enter the access code listed below, as well as some personal information. Please follow the directions to create your username and password.     Your access code is: [email or web address]  Expires: 2017 12:17 PM     Your access code will  in 90 days. If you need help or a new code, please call your [facility] or [phone].        Care EveryWhere ID     This is your Care EveryWhere ID. This could be used by " other organizations to access your [place] medical records  [id number]        Your Vitals Were     Pulse Temperature Respirations Last Period Pulse Oximetry BMI (Body Mass Index)    90 97.9  F (36.6  C) (Oral) 16 09/11/2005 98% 39.16 kg/m2       Blood Pressure from Last 3 Encounters:   05/02/17 142/80   04/06/17 132/80   03/09/17 102/76    Weight from Last 3 Encounters:   05/02/17 242 lb 9.6 oz (110 kg)   04/06/17 239 lb 11.2 oz (108.7 kg)   03/09/17 238 lb 6.4 oz (108.1 kg)               Primary Care Provider Office Phone # Fax #    Ragini Stevens [phone]       [facility] [address]  [place]   [facility] [address]        Thank you!     Thank you for choosing [facility]  for your care. Our goal is always to provide you with excellent care. Hearing back from our patients is one way we can continue to improve our services. Please take a few minutes to complete the written survey that you may receive in the mail after your visit with us. Thank you!             Your Updated Medication List - Protect others around you: Learn how to safely use, store and throw away your medicines at www.disposemymeds.org.          This list is accurate as of: 5/2/17 11:44 AM.  Always use your most recent med list.                   Brand Name Dispense Instructions for use    HYDROcodone-acetaminophen 5-325 MG per tablet    NORCO    45 tablet    Take 1-2 tablets by mouth every 4 hours as needed for moderate to severe pain maximum 2 tablet(s) per day       methocarbamol 750 MG tablet    ROBAXIN    60 tablet    Take 1 tablet (750 mg) by mouth 4 times daily as needed for muscle spasms

## 2017-05-02 NOTE — NURSING NOTE
"Chief Complaint   Patient presents with     Work Comp     Initial /80 (BP Location: Right arm, Patient Position: Chair, Cuff Size: Adult Large)  Pulse 90  Temp 97.9  F (36.6  C) (Oral)  Resp 16  Wt 242 lb 9.6 oz (110 kg)  LMP 09/11/2005  SpO2 98%  BMI 39.16 kg/m2 Estimated body mass index is 39.16 kg/(m^2) as calculated from the following:    Height as of 3/1/17: 5' 6\" (1.676 m).    Weight as of this encounter: 242 lb 9.6 oz (110 kg).  BP completed using cuff size large RIGHT arm.    Lisa Magill, CMA    "

## 2017-05-12 ENCOUNTER — TELEPHONE (OUTPATIENT)
Dept: FAMILY MEDICINE | Facility: CLINIC | Age: 62
End: 2017-05-12

## 2017-05-12 DIAGNOSIS — M25.519 ACUTE SHOULDER PAIN, UNSPECIFIED LATERALITY: Primary | ICD-10-CM

## 2017-05-12 NOTE — TELEPHONE ENCOUNTER
Pt c/o continued rt shoulder pain, limited range of motion  Requesting referral to specialist to treat, prefers [facility]    # [phone]    Route to provider to review and advise    Kishore RN Nurse Triage

## 2017-05-15 NOTE — TELEPHONE ENCOUNTER
We have excellent sports medicine providers. If they feel she needs surgery, then we can refer her to [facility].  Please let her know I recommend sports med in [place] as the next step

## 2017-05-18 ENCOUNTER — OFFICE VISIT (OUTPATIENT)
Dept: ORTHOPEDICS | Facility: CLINIC | Age: 62
End: 2017-05-18
Payer: OTHER MISCELLANEOUS

## 2017-05-18 VITALS
DIASTOLIC BLOOD PRESSURE: 80 MMHG | BODY MASS INDEX: 38.89 KG/M2 | SYSTOLIC BLOOD PRESSURE: 144 MMHG | WEIGHT: 242 LBS | HEIGHT: 66 IN

## 2017-05-18 DIAGNOSIS — Z02.6 ENCOUNTER RELATED TO WORKER'S COMPENSATION CLAIM: ICD-10-CM

## 2017-05-18 DIAGNOSIS — M25.511 ACUTE PAIN OF RIGHT SHOULDER: Primary | ICD-10-CM

## 2017-05-18 PROCEDURE — 99244 OFF/OP CNSLTJ NEW/EST MOD 40: CPT | Performed by: FAMILY MEDICINE

## 2017-05-18 NOTE — PATIENT INSTRUCTIONS
Thank you for allowing us to participate in your care today.  Please find below your visit diagnosis and the plan going forward.    1. Acute pain of right shoulder    2. Encounter related to worker's compensation claim      Activity modification as discussed - no work above the shoulder, no lifting away from the body, limit overall weight to < 10 pounds. Letter for work provided.  MRI of your right shoulder has been ordered. Will check on same day availability.    I will call you tomorrow on your home number with results of MRI.  Call direct clinic number [[phone]] at any time with questions or concerns.    Tahira Dunn DO CAQSM  [facility]  Website: [email or web address]  Twitter: [email or web address]

## 2017-05-18 NOTE — LETTER
[place] [facility]  [address]  [address]  [facility] [facility] [address]  [phone]     May 18, 2017    Laurel Buddy  Suzanne  Fax: [phone]    RE:  Rich Terry  1955  Claim #[id number]      To Whom It May Concern:    Rich was seen in clinic today for    Acute pain of right shoulder  Encounter related to worker's compensation claim.     I am recommending a MRI of her right shoulder to evaluate her rotator cuff and shoulder from her injury sustained on 2/23/17. Her exam today indicates pain over the proximal biceps and rotator cuff area. She also has reduced range of motion and strength of the shoulder. Testing of the shoulder joint indicates possible rotator cuff injury.    Please fax back approval of the MRI to [phone].    Please feel free to contact myself or my Clinic Coordinator, Kadeem Núñez, with any questions or concerns at the above number.    Sincerely,        Tahira Dunn DO, [facility]  Kadeem Núñez [facility], MAEd on behalf of Dr. Dunn

## 2017-05-18 NOTE — Clinical Note
Rich Young Dr. saw me at [facility] on May 18, 2017.  Please refer to the visit note at your convenience and feel free to contact me should you have any questions.  Thank you for the consult. Sincerely,  Tahira Dunn DO, [facility]

## 2017-05-18 NOTE — LETTER
[place] [facility]IC[facility]  [address]  [address]  [facility] [address]  [phone]     May 18, 2017    Rich Terry  1955  [address]  [facility] [address]      To Whom It May Concern:    Rich was seen in clinic today for    Acute pain of right shoulder  Encounter related to worker's compensation claim.     Rich may return to work on or about 5/18/17 with the following: Light duty-unable to lift more than 10 pounds. No overhead lifting or lifting away from the body. These restrictions will be valid from 5/18/17 to 5/25/17.    Rich will schedule to follow up in clinic prior to the above mentioned end date for re-evaluation of    Acute pain of right shoulder  Encounter related to worker's compensation claim and review of work restrictions.    Please feel free to contact myself or my Clinic Coordinator, Kadeem Núñez, with any questions or concerns at the above number.        Sincerely,            Tahira Dunn DO, CAM  Kadeem Núñez [facility], MAEd on behalf of Dr. Dunn

## 2017-05-18 NOTE — PROGRESS NOTES
ASSESSMENT & PLAN    ICD-10-CM    1. Acute pain of right shoulder M25.511 MR Shoulder Right w/o Contrast   2. Encounter related to worker's compensation claim Z02.6    Reviewed xray - no significant GH / AC joint arthritis  Given continued pain/range limitations will pursue additional imaging  Activity modification as discussed - no work above the shoulder, no lifting away from the body, limit overall weight to < 10 pounds. Letter for work provided.  MRI of your right shoulder has been ordered. Will check on same day availability.    I will call you tomorrow on your home number with results of MRI.  Call direct clinic number [[phone]] at any time with questions or concerns. Instructed to call the office if the condition evolves or worsens.    -----    SUBJECTIVE  Rich Terry is a/an 62 year old right hand dominant female who is seen in consultation at the request of Dr. Stevens for evaluation of right shoulder pain. She indicates the front of the upper arm and the top of the shoulder. The patient is seen with their .    Onset: 2/23/17. Patient describes injury as she was at work and she tripped on a board and fell. She held onto a pole/post as she was falling and felt a pull in her shoulder.   Worsened by: reaching behind back, reaching above head  Better with: rest in supported/sling postion  Quality: throbbing, sharp, intemittent  Pain Scale (maximum/current)/10: 9/10 / 3/10  Treatments tried: ice, heat, Tylenol, ibuprofen, other medications: Hydrocodone/Acetaminophen (Vicodin/Norco) and Robaxin and previous imaging (xray see below)  Orthopedic history: NO  Relevant surgical history: NO  Patient Social History: works at [employer]    Patient's past medical, surgical, social, and family histories were reviewed today and no changes are noted.    REVIEW OF SYSTEMS:  10 point ROS is negative other than symptoms noted above in HPI, Past Medical History or as stated below  Constitutional: NEGATIVE for  "fever, chills, change in weight  Skin: NEGATIVE for worrisome rashes, moles or lesions  GI/: NEGATIVE for bowel or bladder changes  Neuro: NEGATIVE for weakness, dizziness or paresthesias    OBJECTIVE:  /80  Ht 5' 6\" (1.676 m)  Wt 242 lb (109.8 kg)  LMP 09/11/2005  BMI 39.06 kg/m2   General: healthy, alert and in no distress  HEENT: no scleral icterus or conjunctival erythema  Skin: no suspicious lesions or rash. No jaundice.  CV: regular rhythm by palpation  Resp: normal respiratory effort without conversational dyspnea   Psych: normal mood and affect  Gait: normal steady gait with appropriate coordination and balance  Neuro: normal light touch sensory exam of the bilateral upper extremities.  Motor strength as noted below.  MSK:  RIGHT SHOULDER  Inspection:    no atrophy  Palpation:    Tender about the proximal biceps tendon, supraspinatus insertion and infraspinatus insertion. Remainder of bony and tendinous landmarks are nontender.  Active Range of Motion:     Abduction 900, FF 1050, , IR hip pocket.    Strength:    Scapular plane abduction 4+/5,  ER 5/5, IR 5/5, biceps 5/5  Special Tests:    Positive: Carson' and supraspinatus (empty can)    Negative: Jeff Davis's and Speed's    Independent visualization of the below image:   XR SHOULDER RT G/E 3 VW 5/2/2017 11:06 AM     COMPARISON: None.     HISTORY: Shoulder pain.     IMPRESSION: No fracture or dislocation is seen in the right shoulder.  Joint spaces are preserved. No significant soft tissue swelling.     ERIBERTO DARBY    Patient's conditions were thoroughly discussed during today's visit with greater than 50% of the visit spent counseling the patient with total time spent face-to-face with the patient being 20 minutes.    Tahira Dunn, DO [facility]  "

## 2017-05-18 NOTE — NURSING NOTE
"Chief Complaint   Patient presents with     Musculoskeletal Problem       Initial /80  Ht 5' 6\" (1.676 m)  Wt 242 lb (109.8 kg)  LMP 09/11/2005  BMI 39.06 kg/m2 Estimated body mass index is 39.06 kg/(m^2) as calculated from the following:    Height as of this encounter: 5' 6\" (1.676 m).    Weight as of this encounter: 242 lb (109.8 kg).  Medication Reconciliation: complete     Kadeem Núñez, ATC    "

## 2017-05-24 ENCOUNTER — TELEPHONE (OUTPATIENT)
Dept: ORTHOPEDICS | Facility: CLINIC | Age: 62
End: 2017-05-24

## 2017-05-24 NOTE — TELEPHONE ENCOUNTER
I called and spoke with her to let her know that no new information has come across yet.    Kadeem Núñez, ATC

## 2017-05-24 NOTE — TELEPHONE ENCOUNTER
Call received from patient regarding scheduling MRI. Call also received from patient's work comp rep requesting MRI order and office visit notes to substantiate need for MRI. Office notes and order for MR Right shoulder w/o contrast faxed to work comp at [phone] Claim # [id number], Atten: Laurel.     Patient informed needed information was sent to work comp. Awaiting approval by Work comp as they may dictate were imaging needs to be completed.   No action needed by physician.    Joanne Hay, ATC

## 2017-05-26 NOTE — TELEPHONE ENCOUNTER
[employer] called and left voicemail that the MRI was approved.    I called and left voicemail with her  that the MRI was approved.    Kadeem Núñez, ATC

## 2017-05-26 NOTE — TELEPHONE ENCOUNTER
I called and left voicemail that I have not seen an approval for her MRI yet.    Kadeem Núñez, ATC

## 2017-05-31 ENCOUNTER — HOSPITAL ENCOUNTER (OUTPATIENT)
Dept: MRI IMAGING | Facility: CLINIC | Age: 62
Discharge: HOME | End: 2017-05-31
Attending: FAMILY MEDICINE | Admitting: FAMILY MEDICINE
Payer: OTHER MISCELLANEOUS

## 2017-05-31 DIAGNOSIS — Z02.6 ENCOUNTER RELATED TO WORKER'S COMPENSATION CLAIM: ICD-10-CM

## 2017-05-31 DIAGNOSIS — M25.511 ACUTE PAIN OF RIGHT SHOULDER: ICD-10-CM

## 2017-05-31 PROCEDURE — 73221 MRI JOINT UPR EXTREM W/O DYE: CPT | Mod: RT

## 2017-05-31 NOTE — TELEPHONE ENCOUNTER
Patient did not receive message below and was calling to see if MR was approved. I informed her it had been approved by [employer]. Patient states [facility] had told her location could be determined by provider. I offered her MRI appointment today @ 315 (scheduled via [facility]). Patient was thankful and will get it completed today.     No action needed by physician.  Joanne Hay, ATC

## 2017-06-07 ENCOUNTER — TELEPHONE (OUTPATIENT)
Dept: ORTHOPEDICS | Facility: CLINIC | Age: 62
End: 2017-06-07

## 2017-06-07 NOTE — TELEPHONE ENCOUNTER
Rich called and left voicemail that she is looking for the results of her MRI.    Kadeem Núñez, ATC

## 2017-06-08 ENCOUNTER — TELEPHONE (OUTPATIENT)
Dept: ORTHOPEDICS | Facility: CLINIC | Age: 62
End: 2017-06-08

## 2017-06-08 NOTE — TELEPHONE ENCOUNTER
Received voicemail from [employer], asking for status of MRI that was approved. They have not heard back if patient has followed up. Would like copy of MRI report and office visit notes of follow up appointment faxed to: [phone] with reference to claim#[id number].     Phone call to [facility] and informed patient has an appointment 6/12/17 with Dr. Dunn to go over MRI results. Asked that she fax written request for above requested information and fax number provided.     Awaiting request.     PARTH Parkinson RN

## 2017-06-12 ENCOUNTER — OFFICE VISIT (OUTPATIENT)
Dept: ORTHOPEDICS | Facility: CLINIC | Age: 62
End: 2017-06-12
Payer: OTHER MISCELLANEOUS

## 2017-06-12 VITALS
WEIGHT: 242 LBS | BODY MASS INDEX: 38.89 KG/M2 | HEIGHT: 66 IN | DIASTOLIC BLOOD PRESSURE: 78 MMHG | SYSTOLIC BLOOD PRESSURE: 124 MMHG

## 2017-06-12 DIAGNOSIS — M19.011 PRIMARY OSTEOARTHRITIS OF RIGHT SHOULDER: ICD-10-CM

## 2017-06-12 DIAGNOSIS — M75.111 INCOMPLETE TEAR OF RIGHT ROTATOR CUFF: ICD-10-CM

## 2017-06-12 DIAGNOSIS — Z02.6 ENCOUNTER RELATED TO WORKER'S COMPENSATION CLAIM: Primary | ICD-10-CM

## 2017-06-12 PROCEDURE — 99213 OFFICE O/P EST LOW 20 MIN: CPT | Performed by: FAMILY MEDICINE

## 2017-06-12 NOTE — MR AVS SNAPSHOT
After Visit Summary   6/12/2017    Rich Terry    MRN: [id number]           Patient Information     Date Of Birth          1955        Visit Information        Provider Department      6/12/2017 8:20 AM Tahira Dunn DO [facility]        Today's Diagnoses     Encounter related to worker's compensation claim    -  1    Incomplete tear of right rotator cuff        Primary osteoarthritis of right shoulder          Care Instructions    Thank you for allowing us to participate in your care today.  Please find below your visit diagnosis and the plan going forward.    1. Encounter related to worker's compensation claim    2. Incomplete tear of right rotator cuff    3. Primary osteoarthritis of right shoulder      Currently retired but injury occurred while at work  Discussed MRI  Acute incomplete tear of one of your rotator cuff tendons. Otherwise, chronic findings.  Activity modification as discussed - limit overhead activity, limit weight < 20 lbs and don't lift away from your body  Physical therapy: [facility] - [phone]  If pain / function does not improve could consider surgical referral  Ok to use as needed Ibuprofen. If needing daily / multiple times a day would recommend considering next steps.    Follow up after 4 PT sessions. Call direct clinic number [[phone]] at any time with questions or concerns.    Tahira Dunn DO [facility]  Website: [email or web address]  Twitter: [email or web address]            Follow-ups after your visit        Who to contact     If you have questions or need follow up information about today's clinic visit or your schedule please contact [facility] directly at [phone].  Normal or non-critical lab and imaging results will be communicated to you by MyChart, letter or phone within 4 business days after the clinic has received the results. If you do  "not hear from us within 7 days, please contact the clinic through [employer] or phone. If you have a critical or abnormal lab result, we will notify you by phone as soon as possible.  Submit refill requests through [employer] or call your pharmacy and they will forward the refill request to us. Please allow 3 business days for your refill to be completed.          Additional Information About Your Visit        GetAFivehart Information     Tessella lets you send messages to your doctor, view your test results, renew your prescriptions, schedule appointments and more. To sign up, go to [email or web address] . Click on \"Log in\" on the left side of the screen, which will take you to the Welcome page. Then click on \"Sign up Now\" on the right side of the page.     You will be asked to enter the access code listed below, as well as some personal information. Please follow the directions to create your username and password.     Your access code is: [email or web address]  Expires: 9/10/2017  8:46 AM     Your access code will  in 90 days. If you need help or a new code, please call your [facility] clinic or [phone].        Care EveryWhere ID     This is your Care EveryWhere ID. This could be used by other organizations to access your [facility] medical records  [id number]        Your Vitals Were     Height Last Period BMI (Body Mass Index)             5' 6\" (1.676 m) 2005 39.06 kg/m2          Blood Pressure from Last 3 Encounters:   17 124/78   17 144/80   17 142/80    Weight from Last 3 Encounters:   17 242 lb (109.8 kg)   17 242 lb (109.8 kg)   17 242 lb 9.6 oz (110 kg)              Today, you had the following     No orders found for display       Primary Care Provider Office Phone # Fax #    Ragini Stevens [phone]       [facility] [address]   [facility] [address]        Thank you!     Thank you for choosing [facility]  " for your care. Our goal is always to provide you with excellent care. Hearing back from our patients is one way we can continue to improve our services. Please take a few minutes to complete the written survey that you may receive in the mail after your visit with us. Thank you!             Your Updated Medication List - Protect others around you: Learn how to safely use, store and throw away your medicines at www.disposemymeds.org.          This list is accurate as of: 6/12/17  8:46 AM.  Always use your most recent med list.                   Brand Name Dispense Instructions for use    HYDROcodone-acetaminophen 5-325 MG per tablet    NORCO    45 tablet    Take 1-2 tablets by mouth every 4 hours as needed for moderate to severe pain maximum 2 tablet(s) per day       methocarbamol 750 MG tablet    ROBAXIN    60 tablet    Take 1 tablet (750 mg) by mouth 4 times daily as needed for muscle spasms

## 2017-06-12 NOTE — PATIENT INSTRUCTIONS
Thank you for allowing us to participate in your care today.  Please find below your visit diagnosis and the plan going forward.    1. Encounter related to worker's compensation claim    2. Incomplete tear of right rotator cuff    3. Primary osteoarthritis of right shoulder      Currently retired but injury occurred while at work  Discussed MRI  Acute incomplete tear of one of your rotator cuff tendons. Otherwise, chronic findings.  Activity modification as discussed - limit overhead activity, limit weight < 20 lbs and don't lift away from your body  Physical therapy: [place] [facility] [place] [facility] - [phone]  If pain / function does not improve could consider surgical referral  Ok to use as needed Ibuprofen. If needing daily / multiple times a day would recommend considering next steps.    Follow up after 4 PT sessions. Call direct clinic number [[phone]] at any time with questions or concerns.    Tahira Dunn DO CAQSM  [place]  Website: [email or web address]  Twitter: [email or web address]

## 2017-06-12 NOTE — PROGRESS NOTES
"ASSESSMENT & PLAN    ICD-10-CM    1. Encounter related to worker's compensation claim Z02.6 [facility], [facility] CHIROPRACTIC REFERRAL     ORTHO  REFERRAL   2. Incomplete tear of right rotator cuff M75.111    3. Primary osteoarthritis of right shoulder M19.011    Currently retired but injury occurred while at work  Discussed MRI  Acute incomplete tear, oherwise chronic findings.  Activity modification as discussed - limit overhead activity, limit weight < 20 lbs and don't lift away from your body  Physical therapy: [place] [facility] - [phone]  If pain / function does not improve could consider surgical referral  Ok to use as needed Ibuprofen.    Follow up after 4 PT sessions. Call direct clinic number [[phone]] at any time with questions or concerns. Instructed to call the office if the condition evolves or worsens.    -----    SUBJECTIVE:  Rich Terry is a 62 year old female who is seen in follow-up for Acute pain of right shoulder  (primary encounter diagnosis). They were last seen 5/18/2017.     Since their last visit reports 0% - (About the same as last time). She reports that she has intermittent episodes of improvement with rest and then her shoulder will get set back with activities such as lifting and pulling. They indicate that their pain level is 2/10. They have tried rest/activity avoidance, ice, heat, ibuprofen and other medications: Hydrocodone/Acetaminophen (Vicodin/Norco).      Patient's past medical, surgical, social, and family histories were reviewed today and no changes are noted.    REVIEW OF SYSTEMS:  Constitutional: NEGATIVE for fever, chills, change in weight  Skin: NEGATIVE for worrisome rashes, moles or lesions  GI/: NEGATIVE for bowel or bladder changes  Neuro: NEGATIVE for weakness, dizziness or paresthesias    OBJECTIVE:  Ht 5' 6\" (1.676 m)  Wt 242 lb (109.8 kg)  LMP 09/11/2005  BMI 39.06 kg/m2   General: healthy, alert and in no distress  HEENT: no " scleral icterus or conjunctival erythema  Skin: no suspicious lesions or rash. No jaundice.  CV: no pedal edema  Resp: normal respiratory effort without conversational dyspnea   Psych: normal mood and affect  Gait: normal steady gait with appropriate coordination and balance  MSK:  Deferred    Independent visualization of the below image:  MR SHOULDER RIGHT WITHOUT CONTRAST  5/31/2017 3:46 PM     HISTORY: Right shoulder pain after injury 2/23/2017.     TECHNIQUE: Oblique coronal T1, T2 and T2 fat suppressed images,  oblique sagittal T2 and axial proton density and T2 weighted images  were obtained.      COMPARISON: None.     FINDINGS:  Osseous acromial outlet: Moderate to marked hypertrophic degenerative  change at the acromioclavicular joint with inferiorly projecting bony  spurs slightly indenting the supraspinatus myotendinous junction. The  distal acromial lateral margin shows chronic fragmentation and overall  morphology on oblique sagittal images is type 1 or 2 with mild  negative slope and there is minimal lateral downsloping on oblique  coronal images. There is a small amount of abnormal fluid and some  edema related to the subacromial/subdeltoid bursa.     Rotator cuff: Diffuse intrasubstance increased signal throughout the  lateral 3 cm of the supraspinatus tendon consistent with marked  degenerative tendinopathy. There is a superimposed full-thickness or  near full-thickness tear far anterior laterally at the footplate  insertion measuring 1.5 cm anterior posterior dimension and 1.5 cm  mediolateral dimension (image 9 of series 6 and 7 and image 6 of  series 8). Minimal bursal fibers may be intact in this region. There  is no overall retraction of the supraspinatus tendon and there is no  fatty atrophy of the supraspinatus muscle. The infraspinatus tendon  shows mild degenerative tendinopathy without tear and the  infraspinatus and teres minor muscles appear normal. There is marked  thinning of the  subscapularis tendon consistent with degenerative  tendinopathy and/or partial tearing. The subscapularis muscle shows  some fatty atrophy along its superior margin only.     Labrum: The glenoid labrum is normal as visualized.     Biceps tendon: The biceps tendon appears thickened with intrasubstance  increased signal at the biceps anchor and coursing over the humeral  head consistent with mucoid degeneration. The tendon is subluxed  medially from the bicipital groove and shows thinning and irregularity  at the level of the groove, likely from partial thickness longitudinal  tearing. Inferior to the bicipital groove the tendon reconstitutes.       Osseous structures and cartilaginous surfaces: No acute appearing bony  abnormalities. There is a small focus of articular cartilage thinning  in the anterior glenoid, adjacent to the anterior labrum (image 14 of  series 4). Labral articular cartilage is otherwise normal. Probable  grade II chondromalacia at the superior humeral head.     Additional findings: Small joint space effusion.         IMPRESSION:   1. Hypertrophic degenerative change at the acromioclavicular joint  which may impinge on the supraspinatus myotendinous junction. Chronic  fragmentation of the lateral margin of the acromion is noted. Small  amount of fluid/edema related to the subacromial/subdeltoid bursa.  2. Marked degenerative tendinopathy with superimposed anterior lateral  1.5 cm full-thickness or near full-thickness tear in the supraspinatus  tendon. Marked degenerative tendinopathy and/or partial thickness  tearing subscapularis tendon and early degenerative tendinopathy  infraspinatus tendon.  3. Mucoid degeneration proximal biceps tendon with medial subluxation  and probable partial thickness tearing at the level of the bicipital  groove.  4. Small focus of chondromalacia in the anterior glenoid and mild  chondromalacia superior humeral head.  5. Joint space effusion.     JERROD PAIZ,  MD    Patient's conditions were thoroughly discussed during today's visit with greater than 50% of the visit spent counseling the patient with total time spent face-to-face with the patient being 15 minutes.    Tahira Dunn DO [address] [facility]

## 2017-06-12 NOTE — NURSING NOTE
"Chief Complaint   Patient presents with     RECHECK       Initial /78  Ht 5' 6\" (1.676 m)  Wt 242 lb (109.8 kg)  LMP 09/11/2005  BMI 39.06 kg/m2 Estimated body mass index is 39.06 kg/(m^2) as calculated from the following:    Height as of this encounter: 5' 6\" (1.676 m).    Weight as of this encounter: 242 lb (109.8 kg).  Medication Reconciliation: complete     Kadeem Núñez, ATC    "

## 2017-06-27 ENCOUNTER — THERAPY VISIT (OUTPATIENT)
Dept: PHYSICAL THERAPY | Facility: CLINIC | Age: 62
End: 2017-06-27
Payer: OTHER MISCELLANEOUS

## 2017-06-27 DIAGNOSIS — M25.511 CHRONIC RIGHT SHOULDER PAIN: Primary | ICD-10-CM

## 2017-06-27 DIAGNOSIS — G89.29 CHRONIC RIGHT SHOULDER PAIN: Primary | ICD-10-CM

## 2017-06-27 PROCEDURE — 97110 THERAPEUTIC EXERCISES: CPT | Mod: GP | Performed by: PHYSICAL THERAPIST

## 2017-06-27 PROCEDURE — 97161 PT EVAL LOW COMPLEX 20 MIN: CPT | Mod: GP | Performed by: PHYSICAL THERAPIST

## 2017-06-27 NOTE — MR AVS SNAPSHOT
"              After Visit Summary   6/27/2017    Rich Terry    MRN: [id number]           Patient Information     Date Of Birth          1955        Visit Information        Provider Department      6/27/2017 11:50 AM Hudson Maradiaga, PT [facility]        Today's Diagnoses     Chronic right shoulder pain    -  1       Follow-ups after your visit        Your next 10 appointments already scheduled     Jul 03, 2017 10:50 AM CDT   KWADWO Extremity with Bonifacio Heredia PT   [facility] ([facility])    [address]  [facility] [id number] [phone]              Who to contact     If you have questions or need follow up information about today's clinic visit or your schedule please contact [facility] PHYS[facility] THERAP[facility] directly at [phone].  Normal or non-critical lab and imaging results will be communicated to you by MyChart, letter or phone within 4 business days after the clinic has received the results. If you do not hear from us within 7 days, please contact the clinic through MyChart or phone. If you have a critical or abnormal lab result, we will notify you by phone as soon as possible.  Submit refill requests through [employer] or call your pharmacy and they will forward the refill request to us. Please allow 3 business days for your refill to be completed.          Additional Information About Your Visit        MyChart Information     [employer] lets you send messages to your doctor, view your test results, renew your prescriptions, schedule appointments and more. To sign up, go to [email or web address] . Click on \"Log in\" on the left side of the screen, which will take you to the Welcome page. Then click on \"Sign up Now\" on the right side of the page.     You will be asked to enter the access code listed below, as well as some personal " information. Please follow the directions to create your username and password.     Your access code is: [email or web address]  Expires: 9/10/2017  8:46 AM     Your access code will  in 90 days. If you need help or a new code, please call your [place] clinic or [phone].        Care EveryWhere ID     This is your Care EveryWhere ID. This could be used by other organizations to access your [place] medical records  [id number]        Your Vitals Were     Last Period                   2005            Blood Pressure from Last 3 Encounters:   17 124/78   17 144/80   17 142/80    Weight from Last 3 Encounters:   17 109.8 kg (242 lb)   17 109.8 kg (242 lb)   17 110 kg (242 lb 9.6 oz)              We Performed the Following     HC PT EVAL, LOW COMPLEXITY     KWADWO INITIAL EVAL REPORT     THERAPEUTIC EXERCISES        Primary Care Provider Office Phone # Fax #    Ragini Niru Stevens [phone]       [facility] [address]  [address]   [facility] [address]        Equal Access to Services     SOLANGE WORKMAN AH: Hadii aad ku hadasho Soomaali, waaxda luqadaha, qaybta kaalmada adeegyada, waxay angelyin haydouglasn víctor fish ah. So [facility] [phone].    ATENCIÓN: Si habla español, tiene a hoffman disposición servicios gratuitos de asistencia lingüística. Llame al [phone].    We comply with applicable federal civil rights laws and [place] laws. We do not discriminate on the basis of race, color, national origin, age, disability sex, sexual orientation or gender identity.            Thank you!     Thank you for choosing [facility] THERAP[facility]  for your care. Our goal is always to provide you with excellent care. Hearing back from our patients is one way we can continue to improve our services. Please take a few minutes to complete the written survey that you may receive in the mail after your visit with us. Thank you!              Your Updated Medication List - Protect others around you: Learn how to safely use, store and throw away your medicines at www.disposemymeds.org.          This list is accurate as of: 6/27/17  3:08 PM.  Always use your most recent med list.                   Brand Name Dispense Instructions for use Diagnosis    HYDROcodone-acetaminophen 5-325 MG per tablet    NORCO    45 tablet    Take 1-2 tablets by mouth every 4 hours as needed for moderate to severe pain maximum 2 tablet(s) per day    Acute bilateral low back pain with left-sided sciatica       methocarbamol 750 MG tablet    ROBAXIN    60 tablet    Take 1 tablet (750 mg) by mouth 4 times daily as needed for muscle spasms    Acute bilateral low back pain with left-sided sciatica

## 2017-06-27 NOTE — PROGRESS NOTES
Subjective:    Patient is a 62 year old female presenting with rehab left shoulder hpi. The history is provided by the patient. No  was used.   Rich Terry is a 62 year old female with a right shoulder condition.  Condition occurred with:  A fall.  Condition occurred: at work.  This is a chronic condition  Pt reports falling at work and catching herself with right arm on 2/23/17.  MRI showed supraspinatus tear..    Patient reports pain:  Anterior.  Radiates to:  Upper arm.   and is intermittent and reported as 3/10.   Pain is worse during the day.  Symptoms are exacerbated by using arm overhead and using arm behind back and relieved by rest.  Since onset symptoms are gradually improving.  Special tests:  MRI.      General health as reported by patient is good.  Pertinent medical history includes:  Overweight, osteoarthritis and implanted device.  Medical allergies: yes (sulpha).  Other surgeries include:  Orthopedic surgery.    Current occupation is Retired  .        Barriers include:  None as reported by the patient.    Red flags:  None as reported by the patient.                        Objective:    System                   Shoulder Evaluation:  ROM:  AROM:  normal                            PROM:  normal                            Pain: combined ext/IR and full active abduction     Strength:    Flexion: Left:4/5    Pain: -    Right: 3/5      Pain:  +    Abduction:  Left: 4/5   Pain:-    Right: 3/5      Pain:+    Internal Rotation:  Left:4/5      Pain:-    Right: 4/5      Pain:-/+  External Rotation:   Left:4/5      Pain:-   Right:3/5      Pain:+            Stability Testing:  normal      Special Tests:      Left shoulder negative for the following special tests:  Rotator cuff tear and Acromioclavicular  Right shoulder positive for the following special tests:Rotator cuff tear  Right shoulder negative for the following special tests:Acrimioclavicular  Palpation:  normal      Mobility Tests:   normal                                                 General     ROS    Assessment/Plan:      Patient is a 62 year old female with right side shoulder complaints.    Patient has the following significant findings with corresponding treatment plan.                Diagnosis 1:  R shoulder pain  Pain -  self management, education, directional preference exercise and home program  Decreased ROM/flexibility - manual therapy and therapeutic exercise  Decreased strength - therapeutic exercise and therapeutic activities  Impaired muscle performance - neuro re-education  Decreased function - therapeutic activities    Therapy Evaluation Codes:   1) History comprised of:   Personal factors that impact the plan of care:      None.    Comorbidity factors that impact the plan of care are:      Implanted device, Osteoarthritis and Overweight.     Medications impacting care: None.  2) Examination of Body Systems comprised of:   Body structures and functions that impact the plan of care:      Shoulder.   Activity limitations that impact the plan of care are:      Bathing, Dressing and Lifting.  3) Clinical presentation characteristics are:   Stable/Uncomplicated.  4) Decision-Making    Low complexity using standardized patient assessment instrument and/or measureable assessment of functional outcome.  Cumulative Therapy Evaluation is: Low complexity.    Previous and current functional limitations:  (See Goal Flow Sheet for this information)    Short term and Long term goals: (See Goal Flow Sheet for this information)     Communication ability:  Patient appears to be able to clearly communicate and understand verbal and written communication and follow directions correctly.  Treatment Explanation - The following has been discussed with the patient:   RX ordered/plan of care  Anticipated outcomes  Possible risks and side effects  This patient would benefit from PT intervention to resume normal activities.   Rehab potential is  good.    Frequency:  2 X week, once daily  Duration:  for 4 weeks  Discharge Plan:  Achieve all LTG.  Independent in home treatment program.  Reach maximal therapeutic benefit.    Please refer to the daily flowsheet for treatment today, total treatment time and time spent performing 1:1 timed codes.

## 2017-07-10 ENCOUNTER — OFFICE VISIT (OUTPATIENT)
Dept: FAMILY MEDICINE | Facility: CLINIC | Age: 62
End: 2017-07-10
Payer: COMMERCIAL

## 2017-07-10 ENCOUNTER — RADIANT APPOINTMENT (OUTPATIENT)
Dept: GENERAL RADIOLOGY | Facility: CLINIC | Age: 62
End: 2017-07-10
Attending: NURSE PRACTITIONER
Payer: COMMERCIAL

## 2017-07-10 VITALS
SYSTOLIC BLOOD PRESSURE: 122 MMHG | OXYGEN SATURATION: 96 % | WEIGHT: 238 LBS | RESPIRATION RATE: 16 BRPM | BODY MASS INDEX: 38.41 KG/M2 | DIASTOLIC BLOOD PRESSURE: 78 MMHG | HEART RATE: 88 BPM | TEMPERATURE: 97.8 F

## 2017-07-10 DIAGNOSIS — Z12.11 SCREEN FOR COLON CANCER: ICD-10-CM

## 2017-07-10 DIAGNOSIS — J01.90 ACUTE SINUSITIS WITH SYMPTOMS > 10 DAYS: Primary | ICD-10-CM

## 2017-07-10 DIAGNOSIS — R05.9 COUGH: ICD-10-CM

## 2017-07-10 PROCEDURE — 99213 OFFICE O/P EST LOW 20 MIN: CPT | Performed by: NURSE PRACTITIONER

## 2017-07-10 PROCEDURE — 71020 XR CHEST 2 VW: CPT

## 2017-07-10 ASSESSMENT — ANXIETY QUESTIONNAIRES
7. FEELING AFRAID AS IF SOMETHING AWFUL MIGHT HAPPEN: NOT AT ALL
5. BEING SO RESTLESS THAT IT IS HARD TO SIT STILL: SEVERAL DAYS
GAD7 TOTAL SCORE: 3
3. WORRYING TOO MUCH ABOUT DIFFERENT THINGS: NOT AT ALL
1. FEELING NERVOUS, ANXIOUS, OR ON EDGE: NOT AT ALL
6. BECOMING EASILY ANNOYED OR IRRITABLE: SEVERAL DAYS
2. NOT BEING ABLE TO STOP OR CONTROL WORRYING: NOT AT ALL

## 2017-07-10 ASSESSMENT — PATIENT HEALTH QUESTIONNAIRE - PHQ9: 5. POOR APPETITE OR OVEREATING: SEVERAL DAYS

## 2017-07-10 NOTE — NURSING NOTE
"Chief Complaint   Patient presents with     Sinus Problem       Initial /78 (BP Location: Right arm, Cuff Size: Adult Large)  Pulse 88  Temp 97.8  F (36.6  C) (Oral)  Resp 16  Wt 238 lb (108 kg)  LMP 09/11/2005  SpO2 96%  BMI 38.41 kg/m2 Estimated body mass index is 38.41 kg/(m^2) as calculated from the following:    Height as of 6/12/17: 5' 6\" (1.676 m).    Weight as of this encounter: 238 lb (108 kg).  Medication Reconciliation: complete   Niya Abrams MA    "

## 2017-07-10 NOTE — MR AVS SNAPSHOT
"              After Visit Summary   7/10/2017    Rich Terry    MRN: [id number]           Patient Information     Date Of Birth          1955        Visit Information        Provider Department      7/10/2017 2:40 PM Brittny Fraire APRN CNP [facility]        Today's Diagnoses     Cough    -  1    Screen for colon cancer        Acute sinusitis with symptoms > 10 days           Follow-ups after your visit        Future tests that were ordered for you today     Open Future Orders        Priority Expected Expires Ordered    Fecal colorectal cancer screen FIT Routine 7/31/2017 10/2/2017 7/10/2017            Who to contact     If you have questions or need follow up information about today's clinic visit or your schedule please contact [facility] directly at [phone].  Normal or non-critical lab and imaging results will be communicated to you by "[employer]hart, letter or phone within 4 business days after the clinic has received the results. If you do not hear from us within 7 days, please contact the clinic through "[employer]hart or phone. If you have a critical or abnormal lab result, we will notify you by phone as soon as possible.  Submit refill requests through [employer] [facility] [place] or call your pharmacy and they will forward the refill request to us. Please allow 3 business days for your refill to be completed.          Additional Information About Your Visit        "[employer]hart Information     [employer] [facility] [place] lets you send messages to your doctor, view your test results, renew your prescriptions, schedule appointments and more. To sign up, go to [email or web address] [facility] [place] . Click on \"Log in\" on the left side of the screen, which will take you to the Welcome page. Then click on \"Sign up Now\" on the right side of the page.     You will be asked to enter the access code listed below, as well as some personal information. Please follow the directions to create your username and password.     Your access code " is: [email or web address]  Expires: 9/10/2017  8:46 AM     Your access code will  in 90 days. If you need help or a new code, please call your [place] clinic or [phone].        Care EveryWhere ID     This is your Care EveryWhere ID. This could be used by other organizations to access your [place] medical records  [id number]        Your Vitals Were     Pulse Temperature Respirations Last Period Pulse Oximetry BMI (Body Mass Index)    88 97.8  F (36.6  C) (Oral) 16 2005 96% 38.41 kg/m2       Blood Pressure from Last 3 Encounters:   07/10/17 122/78   17 124/78   17 144/80    Weight from Last 3 Encounters:   07/10/17 238 lb (108 kg)   17 242 lb (109.8 kg)   17 242 lb (109.8 kg)                 Today's Medication Changes          These changes are accurate as of: 7/10/17  3:28 PM.  If you have any questions, ask your nurse or doctor.               Start taking these medicines.        Dose/Directions    amoxicillin-clavulanate 875-125 MG per tablet   Commonly known as:  AUGMENTIN   Used for:  Acute sinusitis with symptoms > 10 days   Started by:  Brittny Fraire APRN CNP        Dose:  1 tablet   Take 1 tablet by mouth 2 times daily for 10 days   Quantity:  20 tablet   Refills:  0            Where to get your medicines      These medications were sent to [facility] [address] [place] [address] AT [address] [address]    Hours:  24-hours Phone:  [phone]     amoxicillin-clavulanate 875-125 MG per tablet                Primary Care Provider Office Phone # Fax #    Ragini Stevens [phone]       [facility] [address]  [address]   [facility] [address]        Equal Access to Services     SOLANGE WORKMAN AH: Ellyn riggs Soomaali, waaxda luqadaha, qaybta kaalmamamadou galdamez, cheo tate. [facility] [phone].    ATENCIÓN: Si rdorigo emerson,  tiene a hoffman disposición servicios gratuitos de asistencia lingüística. Edson arreola [phone].    We comply with applicable federal civil rights laws and [place] laws. We do not discriminate on the basis of race, color, national origin, age, disability sex, sexual orientation or gender identity.            Thank you!     Thank you for choosing [facility]  for your care. Our goal is always to provide you with excellent care. Hearing back from our patients is one way we can continue to improve our services. Please take a few minutes to complete the written survey that you may receive in the mail after your visit with us. Thank you!             Your Updated Medication List - Protect others around you: Learn how to safely use, store and throw away your medicines at www.disposemymeds.org.          This list is accurate as of: 7/10/17  3:28 PM.  Always use your most recent med list.                   Brand Name Dispense Instructions for use Diagnosis    amoxicillin-clavulanate 875-125 MG per tablet    AUGMENTIN    20 tablet    Take 1 tablet by mouth 2 times daily for 10 days    Acute sinusitis with symptoms > 10 days

## 2017-07-10 NOTE — PROGRESS NOTES
HPI    SUBJECTIVE:                                                    Rich Terry is a 62 year old female who presents to clinic today for the following health issues:      RESPIRATORY SYMPTOMS      Duration: started about 6 weeks ago     Description  nasal congestion, rhinorrhea, facial pain/pressure, cough, wheezing, ear pain right, headache and fatigue/malaise    Severity: moderate    Accompanying signs and symptoms: None    History (predisposing factors):  none    Precipitating or alleviating factors: None    Therapies tried and outcome:  advil sinus and cold, ruthy seltzer cold.     Symptoms have waxed and waned, but don't seem to be getting any better.  Gets in coughing fits.  Cough is productive.  Drainage is worse when she is lying down.  Retired from [employer] in May.  Several co-workers have been diagnosed with lung cancer after working there.  She reports they were all wearing the incorrect respirator masks when chemicals were being sprayed on the plants.  Lingering symptoms have made her concerned.        Problem list and histories reviewed & adjusted, as indicated.  Additional history: as documented    No current outpatient prescriptions on file.     Allergies   Allergen Reactions     Levaquin      Shrimp      throat swells     Sulfa Drugs Rash     rash       Reviewed and updated as needed this visit by clinical staff  Tobacco  Allergies  Problems  Med Hx  Soc Hx      Reviewed and updated as needed this visit by Provider         ROS:  Constitutional, HEENT, cardiovascular, pulmonary, gi and gu systems are negative, except as otherwise noted.    OBJECTIVE:     /78 (BP Location: Right arm, Cuff Size: Adult Large)  Pulse 88  Temp 97.8  F (36.6  C) (Oral)  Resp 16  Wt 238 lb (108 kg)  LMP 09/11/2005  SpO2 96%  BMI 38.41 kg/m2  Body mass index is 38.41 kg/(m^2).  GENERAL: healthy, alert and no distress  HENT: ear canals normal, bilat TM's with serous effusions, nose and mouth without  ulcers or lesions, nasal mucosa congested, bilat maxillary sinus tenderness  NECK: no adenopathy, no asymmetry, masses, or scars and thyroid normal to palpation  RESP: lungs clear to auscultation - no rales, rhonchi or wheezes, normal effort  CV: regular rate and rhythm, normal S1 S2, no S3 or S4, no murmur, click or rub, no peripheral edema and peripheral pulses strong  SKIN: no suspicious lesions or rashes  PSYCH: mentation appears normal, affect normal/bright    Diagnostic Test Results:  CXR:  No obvious pneumonia; will wait for radiology to read    ASSESSMENT/PLAN:   1. Screen for colon cancer  Agrees to FIT test.   - Fecal colorectal cancer screen FIT; Future    2. Cough  Likely due to drainage.   - XR Chest 2 Views; Future    3. Acute sinusitis with symptoms > 10 days  Continue supportive cares.    - amoxicillin-clavulanate (AUGMENTIN) 875-125 MG per tablet; Take 1 tablet by mouth 2 times daily for 10 days  Dispense: 20 tablet; Refill: 0    RTC as needed if no improvement in 4 days or if worsening symptoms     [facility]      Physical Exam

## 2017-07-11 ASSESSMENT — PATIENT HEALTH QUESTIONNAIRE - PHQ9: SUM OF ALL RESPONSES TO PHQ QUESTIONS 1-9: 2

## 2017-07-11 ASSESSMENT — ANXIETY QUESTIONNAIRES: GAD7 TOTAL SCORE: 3

## 2017-07-25 ENCOUNTER — OFFICE VISIT (OUTPATIENT)
Dept: FAMILY MEDICINE | Facility: CLINIC | Age: 62
End: 2017-07-25
Payer: COMMERCIAL

## 2017-07-25 VITALS
WEIGHT: 235.7 LBS | SYSTOLIC BLOOD PRESSURE: 110 MMHG | TEMPERATURE: 97.5 F | RESPIRATION RATE: 20 BRPM | OXYGEN SATURATION: 99 % | DIASTOLIC BLOOD PRESSURE: 70 MMHG | BODY MASS INDEX: 38.04 KG/M2 | HEART RATE: 86 BPM

## 2017-07-25 DIAGNOSIS — N30.01 ACUTE CYSTITIS WITH HEMATURIA: ICD-10-CM

## 2017-07-25 DIAGNOSIS — R30.0 DYSURIA: Primary | ICD-10-CM

## 2017-07-25 DIAGNOSIS — R82.90 NONSPECIFIC FINDING ON EXAMINATION OF URINE: ICD-10-CM

## 2017-07-25 LAB
ALBUMIN UR-MCNC: 100 MG/DL
APPEARANCE UR: ABNORMAL
BACTERIA #/AREA URNS HPF: ABNORMAL /HPF
BACTERIA SPEC CULT: NORMAL
BILIRUB UR QL STRIP: NEGATIVE
COLOR UR AUTO: YELLOW
GLUCOSE UR STRIP-MCNC: NEGATIVE MG/DL
HGB UR QL STRIP: ABNORMAL
KETONES UR STRIP-MCNC: NEGATIVE MG/DL
LEUKOCYTE ESTERASE UR QL STRIP: ABNORMAL
MICRO REPORT STATUS: NORMAL
NITRATE UR QL: POSITIVE
PH UR STRIP: 5.5 PH (ref 5–7)
RBC #/AREA URNS AUTO: ABNORMAL /HPF (ref 0–2)
SP GR UR STRIP: 1.01 (ref 1–1.03)
SPECIMEN SOURCE: NORMAL
URN SPEC COLLECT METH UR: ABNORMAL
UROBILINOGEN UR STRIP-ACNC: 0.2 EU/DL (ref 0.2–1)
WBC #/AREA URNS AUTO: >100 /HPF (ref 0–2)

## 2017-07-25 PROCEDURE — 99213 OFFICE O/P EST LOW 20 MIN: CPT | Performed by: FAMILY MEDICINE

## 2017-07-25 PROCEDURE — 81001 URINALYSIS AUTO W/SCOPE: CPT | Performed by: FAMILY MEDICINE

## 2017-07-25 RX ORDER — CIPROFLOXACIN 500 MG/1
500 TABLET, FILM COATED ORAL 2 TIMES DAILY
Qty: 6 TABLET | Refills: 0 | Status: SHIPPED | OUTPATIENT
Start: 2017-07-25 | End: 2017-10-30

## 2017-07-25 ASSESSMENT — ENCOUNTER SYMPTOMS
HEMATURIA: 1
FLANK PAIN: 0
NAUSEA: 0
DYSURIA: 1
VOMITING: 0
ABDOMINAL PAIN: 0
FEVER: 0
CHILLS: 1
FREQUENCY: 1

## 2017-07-25 NOTE — MR AVS SNAPSHOT
"              After Visit Summary   2017    Rich Terry    MRN: [id number]           Patient Information     Date Of Birth          1955        Visit Information        Provider Department      2017 9:20 AM Mika Up MD [facility]        Today's Diagnoses     Dysuria    -  1    Nonspecific finding on examination of urine        Acute cystitis with hematuria           Follow-ups after your visit        Follow-up notes from your care team     Return in about 1 week (around 2017).      Who to contact     If you have questions or need follow up information about today's clinic visit or your schedule please contact [facility] directly at [phone].  Normal or non-critical lab and imaging results will be communicated to you by MyChart, letter or phone within 4 business days after the clinic has received the results. If you do not hear from us within 7 days, please contact the clinic through MyChart or phone. If you have a critical or abnormal lab result, we will notify you by phone as soon as possible.  Submit refill requests through [employer] or call your pharmacy and they will forward the refill request to us. Please allow 3 business days for your refill to be completed.          Additional Information About Your Visit        MyChart Information     [employer] lets you send messages to your doctor, view your test results, renew your prescriptions, schedule appointments and more. To sign up, go to [email or web address] . Click on \"Log in\" on the left side of the screen, which will take you to the Welcome page. Then click on \"Sign up Now\" on the right side of the page.     You will be asked to enter the access code listed below, as well as some personal information. Please follow the directions to create your username and password.     Your access code is: [email or web address]  Expires: 9/10/2017  8:46 AM     Your access code will  in 90 days. If you need " help or a new code, please call your [place] clinic or [phone].        Care EveryWhere ID     This is your Care EveryWhere ID. This could be used by other organizations to access your [place] medical records  [id number]        Your Vitals Were     Pulse Temperature Respirations Last Period Pulse Oximetry Breastfeeding?    86 97.5  F (36.4  C) (Oral) 20 09/11/2005 99% No    BMI (Body Mass Index)                   38.04 kg/m2            Blood Pressure from Last 3 Encounters:   07/25/17 110/70   07/10/17 122/78   06/12/17 124/78    Weight from Last 3 Encounters:   07/25/17 235 lb 11.2 oz (106.9 kg)   07/10/17 238 lb (108 kg)   06/12/17 242 lb (109.8 kg)              We Performed the Following     UA reflex to Microscopic and Culture     Urine Culture Aerobic Bacterial     Urine Microscopic          Today's Medication Changes          These changes are accurate as of: 7/25/17  9:54 AM.  If you have any questions, ask your nurse or doctor.               Start taking these medicines.        Dose/Directions    ciprofloxacin 500 MG tablet   Commonly known as:  CIPRO   Used for:  Acute cystitis with hematuria   Started by:  Mika Up MD        Dose:  500 mg   Take 1 tablet (500 mg) by mouth 2 times daily   Quantity:  6 tablet   Refills:  0            Where to get your medicines      These medications were sent to [facility] [address] [place] [address] AT [address] [address]    Hours:  24-hours Phone:  [phone]     ciprofloxacin 500 MG tablet                Primary Care Provider Office Phone # Fax #    Ragini Niru Stevens [phone]       [facility] [address]  [place]   [facility] [address]        Equal Access to Services     [employer] WORK[employer] AH: Ellyn Darling, mellissa luwayneadaha, qaybta kaalmamamadou galdamez, cheo tate. So [facility]  [phone].    ATENCIÓN: Si rodrigo emerson, tiene a hoffman disposición servicios gratuitos de asistencia lingüística. Edson al [phone].    We comply with applicable federal civil rights laws and [place] laws. We do not discriminate on the basis of race, color, national origin, age, disability sex, sexual orientation or gender identity.            Thank you!     Thank you for choosing [facility]  for your care. Our goal is always to provide you with excellent care. Hearing back from our patients is one way we can continue to improve our services. Please take a few minutes to complete the written survey that you may receive in the mail after your visit with us. Thank you!             Your Updated Medication List - Protect others around you: Learn how to safely use, store and throw away your medicines at www.disposemymeds.org.          This list is accurate as of: 7/25/17  9:54 AM.  Always use your most recent med list.                   Brand Name Dispense Instructions for use Diagnosis    ciprofloxacin 500 MG tablet    CIPRO    6 tablet    Take 1 tablet (500 mg) by mouth 2 times daily    Acute cystitis with hematuria

## 2017-07-25 NOTE — NURSING NOTE
"Chief Complaint   Patient presents with     UTI       Initial /70  Pulse 86  Temp 97.5  F (36.4  C) (Oral)  Resp 20  Wt 235 lb 11.2 oz (106.9 kg)  LMP 09/11/2005  SpO2 99%  Breastfeeding? No  BMI 38.04 kg/m2 Estimated body mass index is 38.04 kg/(m^2) as calculated from the following:    Height as of 6/12/17: 5' 6\" (1.676 m).    Weight as of this encounter: 235 lb 11.2 oz (106.9 kg).  Medication Reconciliation: complete   Rebecca Antoine CMA (AAMA)      "

## 2017-07-25 NOTE — PROGRESS NOTES
HPI    SUBJECTIVE:                                                    Rich Terry is a 62 year old female who presents to clinic today for the following health issues:      URINARY TRACT SYMPTOMS  Onset: x4-5 days    Description:   Painful urination (Dysuria): YES  Blood in urine (Hematuria): YES  Delay in urine (Hesitency): no     Intensity: moderate    Progression of Symptoms:  worsening    Accompanying Signs & Symptoms:  Fever/chills: YES- chills last night  Flank pain YES  Nausea and vomiting: no   Any vaginal symptoms: none  Abdominal/Pelvic Pain: YES    History:   History of frequent UTI's: no   History of kidney stones: no   Sexually Active: no   Possibility of pregnancy: No    Precipitating factors:   Was treated with antibiotics for a sinus infection last week; still having sinus symptoms    Therapies Tried and outcome: mild improvement, Cranberry juice prn (contraindicated in Coumadin patients) and Increase fluid intake    Some bloody urine, urgency without significant urination.  Abdominal pain, dysuria.  Cranberry juice helped a bit.  Felt chilled last night.      Review of Systems   Constitutional: Positive for chills. Negative for fever.   Gastrointestinal: Negative for abdominal pain, nausea and vomiting.   Genitourinary: Positive for dysuria, frequency, hematuria and urgency. Negative for flank pain.         Physical Exam   Constitutional: She is oriented to person, place, and time and well-developed, well-nourished, and in no distress.   Eyes: Conjunctivae and EOM are normal.   Cardiovascular: Normal rate, regular rhythm and normal heart sounds.    Pulmonary/Chest: Effort normal and breath sounds normal.   Abdominal: Soft. Bowel sounds are normal. There is no tenderness. There is no rebound and no guarding.   Musculoskeletal: She exhibits no edema.   Neurological: She is alert and oriented to person, place, and time.   Skin: Skin is warm and dry.   Vitals reviewed.    (R30.0) Dysuria  (primary  encounter diagnosis)  Comment: had cramping from levofloxacin in the past, should be oK with cipr (also, shorter course)  Plan: UA reflex to Microscopic and Culture, Urine         Microscopic            (R82.90) Nonspecific finding on examination of urine  Comment:   Plan: Urine Culture Aerobic Bacterial            (N30.01) Acute cystitis with hematuria  Comment:   Plan: ciprofloxacin (CIPRO) 500 MG tablet              RTC in 1w    Mika Up MD

## 2017-08-02 ENCOUNTER — RADIANT APPOINTMENT (OUTPATIENT)
Dept: MAMMOGRAPHY | Facility: CLINIC | Age: 62
End: 2017-08-02
Attending: FAMILY MEDICINE
Payer: COMMERCIAL

## 2017-08-02 DIAGNOSIS — Z12.31 ENCOUNTER FOR SCREENING MAMMOGRAM FOR HIGH-RISK PATIENT: ICD-10-CM

## 2017-08-02 PROCEDURE — G0202 SCR MAMMO BI INCL CAD: HCPCS | Mod: TC

## 2017-09-18 PROBLEM — G89.29 CHRONIC RIGHT SHOULDER PAIN: Status: RESOLVED | Noted: 2017-06-27 | Resolved: 2017-09-18

## 2017-09-18 PROBLEM — M25.511 CHRONIC RIGHT SHOULDER PAIN: Status: RESOLVED | Noted: 2017-06-27 | Resolved: 2017-09-18

## 2017-10-30 ENCOUNTER — OFFICE VISIT (OUTPATIENT)
Dept: FAMILY MEDICINE | Facility: CLINIC | Age: 62
End: 2017-10-30
Payer: COMMERCIAL

## 2017-10-30 VITALS
WEIGHT: 240 LBS | BODY MASS INDEX: 38.74 KG/M2 | HEART RATE: 102 BPM | RESPIRATION RATE: 20 BRPM | DIASTOLIC BLOOD PRESSURE: 82 MMHG | TEMPERATURE: 97.5 F | SYSTOLIC BLOOD PRESSURE: 128 MMHG

## 2017-10-30 DIAGNOSIS — N30.01 ACUTE CYSTITIS WITH HEMATURIA: Primary | ICD-10-CM

## 2017-10-30 DIAGNOSIS — R35.0 URINARY FREQUENCY: ICD-10-CM

## 2017-10-30 DIAGNOSIS — R82.90 ABNORMAL URINE FINDINGS: ICD-10-CM

## 2017-10-30 DIAGNOSIS — Z12.11 SCREEN FOR COLON CANCER: ICD-10-CM

## 2017-10-30 LAB
ALBUMIN UR-MCNC: 100 MG/DL
APPEARANCE UR: ABNORMAL
BACTERIA #/AREA URNS HPF: ABNORMAL /HPF
BILIRUB UR QL STRIP: NEGATIVE
COLOR UR AUTO: ABNORMAL
GLUCOSE UR STRIP-MCNC: NEGATIVE MG/DL
HGB UR QL STRIP: ABNORMAL
KETONES UR STRIP-MCNC: NEGATIVE MG/DL
LEUKOCYTE ESTERASE UR QL STRIP: ABNORMAL
NITRATE UR QL: NEGATIVE
PH UR STRIP: 6 PH (ref 5–7)
RBC #/AREA URNS AUTO: >100 /HPF
SOURCE: ABNORMAL
SP GR UR STRIP: 1.02 (ref 1–1.03)
UROBILINOGEN UR STRIP-ACNC: 0.2 EU/DL (ref 0.2–1)
WBC #/AREA URNS AUTO: >100 /HPF

## 2017-10-30 PROCEDURE — 99213 OFFICE O/P EST LOW 20 MIN: CPT | Performed by: NURSE PRACTITIONER

## 2017-10-30 PROCEDURE — 87086 URINE CULTURE/COLONY COUNT: CPT | Performed by: NURSE PRACTITIONER

## 2017-10-30 PROCEDURE — 87186 SC STD MICRODIL/AGAR DIL: CPT | Performed by: NURSE PRACTITIONER

## 2017-10-30 PROCEDURE — 81001 URINALYSIS AUTO W/SCOPE: CPT | Performed by: NURSE PRACTITIONER

## 2017-10-30 PROCEDURE — 87088 URINE BACTERIA CULTURE: CPT | Performed by: NURSE PRACTITIONER

## 2017-10-30 RX ORDER — NITROFURANTOIN 25; 75 MG/1; MG/1
100 CAPSULE ORAL 2 TIMES DAILY
Qty: 14 CAPSULE | Refills: 0 | Status: SHIPPED | OUTPATIENT
Start: 2017-10-30 | End: 2017-11-06

## 2017-10-30 NOTE — NURSING NOTE
"Chief Complaint   Patient presents with     Urinary Problem       Initial /82 (BP Location: Right arm, Cuff Size: Adult Regular)  Pulse 102  Temp 97.5  F (36.4  C) (Oral)  Resp 20  Wt 240 lb (108.9 kg)  LMP 09/11/2005  BMI 38.74 kg/m2 Estimated body mass index is 38.74 kg/(m^2) as calculated from the following:    Height as of 6/12/17: 5' 6\" (1.676 m).    Weight as of this encounter: 240 lb (108.9 kg).  Medication Reconciliation: complete   Niya Abrams MA    "

## 2017-10-30 NOTE — PROGRESS NOTES
HPI    SUBJECTIVE:   Rich Terry is a 62 year old female who presents to clinic today for the following health issues:      URINARY TRACT SYMPTOMS      Duration: started about 1 week ago     Description  dysuria, frequency, urgency and hematuria    Intensity:  moderate    Accompanying signs and symptoms:  Fever/chills: YES- chills   Flank pain no   Nausea and vomiting: no   Vaginal symptoms: odor  Abdominal/Pelvic Pain:Yes and back pain     History  History of frequent UTI's: no   History of kidney stones: no   Sexually Active: not really   Possibility of pregnancy: No    Precipitating or alleviating factors: mother bladder had bladder cancer.     Therapies tried and outcome: none   Outcome: NA  Has gotten worse over the past week.  Last treated for UTI in July 2017.        Problem list and histories reviewed & adjusted, as indicated.  Additional history: as documented    No current outpatient prescriptions on file.     Allergies   Allergen Reactions     Levaquin      Shrimp      throat swells     Sulfa Drugs Rash     rash       Reviewed and updated as needed this visit by clinical staffTobacco  Allergies  Problems  Med Hx  Soc Hx      Reviewed and updated as needed this visit by Provider         ROS:  Constitutional, HEENT, cardiovascular, pulmonary, gi and gu systems are negative, except as otherwise noted.      OBJECTIVE:   /82 (BP Location: Right arm, Cuff Size: Adult Regular)  Pulse 102  Temp 97.5  F (36.4  C) (Oral)  Resp 20  Wt 240 lb (108.9 kg)  LMP 09/11/2005  BMI 38.74 kg/m2  Body mass index is 38.74 kg/(m^2).  GENERAL: healthy, alert and no distress  RESP: lungs clear to auscultation - no rales, rhonchi or wheezes  CV: regular rate and rhythm, normal S1 S2, no S3 or S4, no murmur, click or rub  ABDOMEN: soft, mild suprapubic tenderness, no hepatosplenomegaly, no masses and bowel sounds normal  BACK: no CVA tenderness    Diagnostic Test Results:  Results for orders placed or performed  in visit on 10/30/17   *UA reflex to Microscopic and Culture ([place] and [facility] (except [place] and [place])   Result Value Ref Range    Color Urine Glenys     Appearance Urine Cloudy     Glucose Urine Negative NEG^Negative mg/dL    Bilirubin Urine Negative NEG^Negative    Ketones Urine Negative NEG^Negative mg/dL    Specific Gravity Urine 1.020 1.003 - 1.035    Blood Urine Large (A) NEG^Negative    pH Urine 6.0 5.0 - 7.0 pH    Protein Albumin Urine 100 (A) NEG^Negative mg/dL    Urobilinogen Urine 0.2 0.2 - 1.0 EU/dL    Nitrite Urine Negative NEG^Negative    Leukocyte Esterase Urine Moderate (A) NEG^Negative    Source Midstream Urine    Urine Microscopic   Result Value Ref Range    WBC Urine >100 (A) OTO2^O - 2 /HPF    RBC Urine >100 (A) OTO2^O - 2 /HPF    Bacteria Urine Moderate (A) NEG^Negative /HPF         ASSESSMENT/PLAN:   1. Urinary frequency    - *UA reflex to Microscopic and Culture ([place] and [facility] (except [place] and [place])  - Urine Culture Aerobic Bacterial  - Urine Microscopic    2. Abnormal urine findings      3. Acute cystitis with hematuria  Increase fluids.  Will follow culture and sensitivity report.  If symptoms are not improving in the next 3 days or any worsening symptoms return for follow up.    - nitroFURantoin, macrocrystal-monohydrate, (MACROBID) 100 MG capsule; Take 1 capsule (100 mg) by mouth 2 times daily for 7 days  Dispense: 14 capsule; Refill: 0    4. Screen for colon cancer  Discussed colonoscopy.   Declines.  Also discussed FIT test.  She has the kit at home.  Will try and get it done and returned.      DUTCH Polanco [facility]      Physical Exam

## 2017-10-30 NOTE — MR AVS SNAPSHOT
"              After Visit Summary   10/30/2017    Rich Terry    MRN: [id number]           Patient Information     Date Of Birth          1955        Visit Information        Provider Department      10/30/2017 2:00 PM Brittny Fraire APRN CNP [facility]        Today's Diagnoses     Acute cystitis with hematuria    -  1    Urinary frequency        Abnormal urine findings           Follow-ups after your visit        Follow-up notes from your care team     Return if symptoms worsen or fail to improve.      Who to contact     If you have questions or need follow up information about today's clinic visit or your schedule please contact [facility] directly at [phone].  Normal or non-critical lab and imaging results will be communicated to you by MyChart, letter or phone within 4 business days after the clinic has received the results. If you do not hear from us within 7 days, please contact the clinic through MyChart or phone. If you have a critical or abnormal lab result, we will notify you by phone as soon as possible.  Submit refill requests through Rezzcard or call your pharmacy and they will forward the refill request to us. Please allow 3 business days for your refill to be completed.          Additional Information About Your Visit        MyChart Information     Rezzcard lets you send messages to your doctor, view your test results, renew your prescriptions, schedule appointments and more. To sign up, go to [email or web address] . Click on \"Log in\" on the left side of the screen, which will take you to the Welcome page. Then click on \"Sign up Now\" on the right side of the page.     You will be asked to enter the access code listed below, as well as some personal information. Please follow the directions to create your username and password.     Your access code is: [email or web address]  Expires: 2018  2:26 PM     Your access code will  in 90 days. If you " need help or a new code, please call your [place] clinic or [phone].        Care EveryWhere ID     This is your Care EveryWhere ID. This could be used by other organizations to access your [place] medical records  [id number]        Your Vitals Were     Pulse Temperature Respirations Last Period BMI (Body Mass Index)       102 97.5  F (36.4  C) (Oral) 20 09/11/2005 38.74 kg/m2        Blood Pressure from Last 3 Encounters:   10/30/17 128/82   07/25/17 110/70   07/10/17 122/78    Weight from Last 3 Encounters:   10/30/17 240 lb (108.9 kg)   07/25/17 235 lb 11.2 oz (106.9 kg)   07/10/17 238 lb (108 kg)              We Performed the Following     *UA reflex to Microscopic and Culture ([place] and [facility] (except [place] and [place])     Urine Culture Aerobic Bacterial     Urine Microscopic          Today's Medication Changes          These changes are accurate as of: 10/30/17  2:26 PM.  If you have any questions, ask your nurse or doctor.               Start taking these medicines.        Dose/Directions    nitroFURantoin (macrocrystal-monohydrate) 100 MG capsule   Commonly known as:  MACROBID   Used for:  Acute cystitis with hematuria   Started by:  Brittny Fraire APRN CNP        Dose:  100 mg   Take 1 capsule (100 mg) by mouth 2 times daily for 7 days   Quantity:  14 capsule   Refills:  0            Where to get your medicines      These medications were sent to [facility] [address] [place] [address]  [id number] [place] [id number]    Hours:  24-hours Phone:  [phone]     nitroFURantoin (macrocrystal-monohydrate) 100 MG capsule                Primary Care Provider Office Phone # Fax #    Ragini Stevens [phone]       [id number]  [address]   [facility] [address]        Equal Access to Services     SOLANGE WORKMAN AH: Ellyn Darling, mellissa greenwood, cheo kunz  amira kerrelba la'aan ah. So [facility] [phone].    ATENCIÓN: Si habla idania, tiene a hoffman disposición servicios gratuitos de asistencia lingüística. Edson al [phone].    We comply with applicable federal civil rights laws and [place] laws. We do not discriminate on the basis of race, color, national origin, age, disability, sex, sexual orientation, or gender identity.            Thank you!     Thank you for choosing [facility]  for your care. Our goal is always to provide you with excellent care. Hearing back from our patients is one way we can continue to improve our services. Please take a few minutes to complete the written survey that you may receive in the mail after your visit with us. Thank you!             Your Updated Medication List - Protect others around you: Learn how to safely use, store and throw away your medicines at www.disposemymeds.org.          This list is accurate as of: 10/30/17  2:26 PM.  Always use your most recent med list.                   Brand Name Dispense Instructions for use Diagnosis    nitroFURantoin (macrocrystal-monohydrate) 100 MG capsule    MACROBID    14 capsule    Take 1 capsule (100 mg) by mouth 2 times daily for 7 days    Acute cystitis with hematuria

## 2017-11-01 LAB
BACTERIA SPEC CULT: ABNORMAL
SPECIMEN SOURCE: ABNORMAL

## 2018-03-15 ENCOUNTER — NURSE TRIAGE (OUTPATIENT)
Dept: NURSING | Facility: CLINIC | Age: 63
End: 2018-03-15

## 2018-03-15 NOTE — TELEPHONE ENCOUNTER
"  Reason for Disposition    [1] SEVERE pain (e.g., excruciating, scale 8-10) AND [2] present > 1 hour      Bill calling\" My wife has been sick in bed for 4 days now. I think she might have the flu, fever 100.0, body aches, severe headache and cough(ribs are hurting). But she also has flank pain especially on the left side , blood in her urine and her stools are black.\"(has not taken iron or pepto or other OTC medications). Denies other sx. Triaged and advised ER.    Additional Information    Negative: Severe difficulty breathing (e.g., struggling for each breath, speaks in single words)    Negative: Bluish lips, tongue, or face now    Negative: Shock suspected (e.g., cold/pale/clammy skin, too weak to stand, low BP, rapid pulse)    Negative: Sounds like a life-threatening emergency to the triager    Negative: Severe sore throat    Negative: [1] Doesn't match the criteria for Influenza AND [2] sounds like a cold    Negative: Influenza vaccine reaction is suspected    Negative: Passed out (i.e., lost consciousness, collapsed and was not responding)    Negative: Shock suspected (e.g., cold/pale/clammy skin, too weak to stand, low BP, rapid pulse)    Negative: Difficult to awaken or acting confused  (e.g., disoriented, slurred speech)    Negative: Sounds like a life-threatening emergency to the triager    Negative: Followed a major injury to the back (e.g., MVA, fall > 10 feet or 3 meters, penetrating injury, etc.)    Negative: Back pain or flank pain during pregnancy    Negative: Upper, mid or lower back pain that occurs mainly in the midline    Protocols used: FLANK PAIN-ADULT-AH, INFLUENZA - SEASONAL-ADULT-    "

## 2018-03-16 ENCOUNTER — OFFICE VISIT (OUTPATIENT)
Dept: FAMILY MEDICINE | Facility: CLINIC | Age: 63
End: 2018-03-16
Payer: COMMERCIAL

## 2018-03-16 VITALS
TEMPERATURE: 98.1 F | OXYGEN SATURATION: 95 % | BODY MASS INDEX: 38.58 KG/M2 | DIASTOLIC BLOOD PRESSURE: 60 MMHG | WEIGHT: 239 LBS | RESPIRATION RATE: 16 BRPM | HEART RATE: 102 BPM | SYSTOLIC BLOOD PRESSURE: 98 MMHG

## 2018-03-16 DIAGNOSIS — R30.0 DYSURIA: ICD-10-CM

## 2018-03-16 DIAGNOSIS — J10.1 INFLUENZA B: Primary | ICD-10-CM

## 2018-03-16 LAB
ALBUMIN UR-MCNC: NEGATIVE MG/DL
APPEARANCE UR: CLEAR
BASOPHILS # BLD AUTO: 0 10E9/L (ref 0–0.2)
BASOPHILS NFR BLD AUTO: 0.4 %
BILIRUB UR QL STRIP: NEGATIVE
COLOR UR AUTO: YELLOW
DIFFERENTIAL METHOD BLD: NORMAL
EOSINOPHIL # BLD AUTO: 0 10E9/L (ref 0–0.7)
EOSINOPHIL NFR BLD AUTO: 0.4 %
ERYTHROCYTE [DISTWIDTH] IN BLOOD BY AUTOMATED COUNT: 13.3 % (ref 10–15)
FLUAV+FLUBV AG SPEC QL: NEGATIVE
FLUAV+FLUBV AG SPEC QL: POSITIVE
GLUCOSE UR STRIP-MCNC: NEGATIVE MG/DL
HCT VFR BLD AUTO: 46.4 % (ref 35–47)
HGB BLD-MCNC: 15.7 G/DL (ref 11.7–15.7)
HGB UR QL STRIP: ABNORMAL
KETONES UR STRIP-MCNC: NEGATIVE MG/DL
LEUKOCYTE ESTERASE UR QL STRIP: ABNORMAL
LYMPHOCYTES # BLD AUTO: 2.3 10E9/L (ref 0.8–5.3)
LYMPHOCYTES NFR BLD AUTO: 43.3 %
MCH RBC QN AUTO: 30.6 PG (ref 26.5–33)
MCHC RBC AUTO-ENTMCNC: 33.8 G/DL (ref 31.5–36.5)
MCV RBC AUTO: 90 FL (ref 78–100)
MONOCYTES # BLD AUTO: 0.7 10E9/L (ref 0–1.3)
MONOCYTES NFR BLD AUTO: 12.7 %
NEUTROPHILS # BLD AUTO: 2.3 10E9/L (ref 1.6–8.3)
NEUTROPHILS NFR BLD AUTO: 43.2 %
NITRATE UR QL: NEGATIVE
PH UR STRIP: 5.5 PH (ref 5–7)
PLATELET # BLD AUTO: 237 10E9/L (ref 150–450)
RBC # BLD AUTO: 5.13 10E12/L (ref 3.8–5.2)
RBC #/AREA URNS AUTO: NORMAL /HPF
SOURCE: ABNORMAL
SP GR UR STRIP: <=1.005 (ref 1–1.03)
SPECIMEN SOURCE: ABNORMAL
UROBILINOGEN UR STRIP-ACNC: 0.2 EU/DL (ref 0.2–1)
WBC # BLD AUTO: 5.3 10E9/L (ref 4–11)
WBC #/AREA URNS AUTO: NORMAL /HPF

## 2018-03-16 PROCEDURE — 81001 URINALYSIS AUTO W/SCOPE: CPT | Performed by: FAMILY MEDICINE

## 2018-03-16 PROCEDURE — 85025 COMPLETE CBC W/AUTO DIFF WBC: CPT | Performed by: FAMILY MEDICINE

## 2018-03-16 PROCEDURE — 87804 INFLUENZA ASSAY W/OPTIC: CPT | Performed by: FAMILY MEDICINE

## 2018-03-16 PROCEDURE — 99213 OFFICE O/P EST LOW 20 MIN: CPT | Performed by: FAMILY MEDICINE

## 2018-03-16 PROCEDURE — 36415 COLL VENOUS BLD VENIPUNCTURE: CPT | Performed by: FAMILY MEDICINE

## 2018-03-16 PROCEDURE — 87086 URINE CULTURE/COLONY COUNT: CPT | Performed by: FAMILY MEDICINE

## 2018-03-16 NOTE — MR AVS SNAPSHOT
"              After Visit Summary   3/16/2018    Rich Terry    MRN: [id number]           Patient Information     Date Of Birth          1955        Visit Information        Provider Department      3/16/2018 9:15 AM Teodoro Castro MD [facility]        Today's Diagnoses     Influenza B    -  1    Dysuria           Follow-ups after your visit        Who to contact     If you have questions or need follow up information about today's clinic visit or your schedule please contact [facility] directly at [phone].  Normal or non-critical lab and imaging results will be communicated to you by Attention Pointhart, letter or phone within 4 business days after the clinic has received the results. If you do not hear from us within 7 days, please contact the clinic through Attention Pointhart or phone. If you have a critical or abnormal lab result, we will notify you by phone as soon as possible.  Submit refill requests through Profitero or call your pharmacy and they will forward the refill request to us. Please allow 3 business days for your refill to be completed.          Additional Information About Your Visit        MyChart Information     Profitero lets you send messages to your doctor, view your test results, renew your prescriptions, schedule appointments and more. To sign up, go to [email or web address] . Click on \"Log in\" on the left side of the screen, which will take you to the Welcome page. Then click on \"Sign up Now\" on the right side of the page.     You will be asked to enter the access code listed below, as well as some personal information. Please follow the directions to create your username and password.     Your access code is: [email or web address]  Expires: 2018 10:11 AM     Your access code will  in 90 days. If you need help or a new code, please call your [facility] or [phone].        Care EveryWhere ID     This is your Care EveryWhere ID. This could be used by other " organizations to access your [place] medical records  [id number]        Your Vitals Were     Pulse Temperature Respirations Last Period Pulse Oximetry BMI (Body Mass Index)    102 98.1  F (36.7  C) (Oral) 16 09/11/2005 95% 38.58 kg/m2       Blood Pressure from Last 3 Encounters:   03/16/18 98/60   10/30/17 128/82   07/25/17 110/70    Weight from Last 3 Encounters:   03/16/18 239 lb (108.4 kg)   10/30/17 240 lb (108.9 kg)   07/25/17 235 lb 11.2 oz (106.9 kg)              We Performed the Following     *UA reflex to Microscopic and Culture ([place] and [facility] (except [place] and [place])     CBC with platelets and differential     Influenza A/B antigen     Urine Culture Aerobic Bacterial     Urine Microscopic        Primary Care Provider Office Phone # Fax #    Ragini Niru Stevens [phone]       [address]  [address]   [facility] [address]        Equal Access to Services     LUZ WORKMAN AH: Hadii aad ku hadasho Soomaali, waaxda luqadaha, qaybta kaalmada adeegyada, waxay idiin haydouglasn víctor fish . So [facility] [phone].    ATENCIÓN: Si habla español, tiene a hoffman disposición servicios gratuitos de asistencia lingüística. [facility] [phone].    We comply with applicable federal civil rights laws and [place] laws. We do not discriminate on the basis of race, color, national origin, age, disability, sex, sexual orientation, or gender identity.            Thank you!     Thank you for choosing [facility]  for your care. Our goal is always to provide you with excellent care. Hearing back from our patients is one way we can continue to improve our services. Please take a few minutes to complete the written survey that you may receive in the mail after your visit with us. Thank you!             Your Updated Medication List - Protect others around you: Learn how to safely use, store and throw away your medicines at www.disposemymeds.org.      Notice  As of  3/16/2018 11:59 PM    You have not been prescribed any medications.

## 2018-03-16 NOTE — PROGRESS NOTES
SUBJECTIVE:   Rich Terry is a 63 year old female who presents to clinic today for the following health issues:    URINARY TRACT SYMPTOMS  Onset: 3/10/18    Description:   Painful urination (Dysuria): no   Blood in urine (Hematuria): YES  Delay in urine (Hesitency): no     Intensity: moderate    Progression of Symptoms:  improving and intermittent    Accompanying Signs & Symptoms:  Fever/chills: YES  Flank pain YES  Nausea and vomiting: YES  Any vaginal symptoms: none  Abdominal/Pelvic Pain: YES    History:   History of frequent UTI's: YES- past couple years more frequent  History of kidney stones: no   Sexually Active: no   Possibility of pregnancy: No    Precipitating factors:     Patient states that she has been vomiting, black stools, diarrhea, severe headache, weakness, chills, chest congestion, back pain    Therapies Tried and outcome: Increased fluid intake and OTC advil or tylenol   Tums, jeremiah back and body, motrin, excedrin    ROS:  General, neuro, sleep, psych, musculoskeletal system otherwise negative.    BP 98/60 (BP Location: Left arm, Patient Position: Sitting, Cuff Size: Adult Large)  Pulse 102  Temp 98.1  F (36.7  C) (Oral)  Resp 16  Wt 239 lb (108.4 kg)  LMP 09/11/2005  SpO2 95%  BMI 38.58 kg/m2     GENERAL: obese, fatigued, looks moderately ill  EYES: Eyes grossly normal to inspection, PERRL and conjunctivae and sclerae normal  HENT: ear canals and TM's normal, nose and mouth without ulcers or lesions  NECK: no adenopathy, no asymmetry, masses, or scars and thyroid normal to palpation  RESP: lungs clear to auscultation - no rales, rhonchi or wheezes  CV: mildly tachy, normal S1 S2, no S3 or S4, no murmur, click or rub, no peripheral edema and peripheral pulses strong  MS: no gross musculoskeletal defects noted, no edema  SKIN: no suspicious lesions or rashes  NEURO: Normal strength and tone, mentation intact and speech normal  PSYCH: mentation appears normal, affect  normal/bright    ASSESSMENT:  1. Dysuria  Needs to have urine specimen to ensure no uti/kidney  - *UA reflex to Microscopic and Culture ([place] and [facility] (except [place] and [place])  - CBC with platelets and differential    2. Flu B  Out of tx window   Symptomatic cares were discussed in detail.   Pt instructed to come back to the clinic for worsening sx      prophylaxed  - Influenza A/B antigen

## 2018-03-17 LAB
BACTERIA SPEC CULT: NO GROWTH
SPECIMEN SOURCE: NORMAL

## 2018-05-14 ENCOUNTER — OFFICE VISIT (OUTPATIENT)
Dept: FAMILY MEDICINE | Facility: CLINIC | Age: 63
End: 2018-05-14
Payer: COMMERCIAL

## 2018-05-14 VITALS
HEART RATE: 90 BPM | OXYGEN SATURATION: 98 % | WEIGHT: 245 LBS | TEMPERATURE: 98.7 F | DIASTOLIC BLOOD PRESSURE: 80 MMHG | SYSTOLIC BLOOD PRESSURE: 128 MMHG | BODY MASS INDEX: 39.54 KG/M2 | RESPIRATION RATE: 16 BRPM

## 2018-05-14 DIAGNOSIS — J01.90 ACUTE SINUSITIS WITH SYMPTOMS > 10 DAYS: Primary | ICD-10-CM

## 2018-05-14 PROCEDURE — 99213 OFFICE O/P EST LOW 20 MIN: CPT | Performed by: NURSE PRACTITIONER

## 2018-05-14 NOTE — PROGRESS NOTES
SUBJECTIVE:   Rich Terry is a 63 year old female who presents to clinic today for the following health issues:      Acute Illness   Acute illness concerns: ear problem, cough  Onset: dx with flu in march, has not felt all the way better since.    Fever: no    Chills/Sweats: no    Headache (location?): YES- daily    Sinus Pressure:YES- intemittent    Conjunctivitis:  no    Ear Pain: YES- cracking, can feel fluid in ear causing some dizziness.     Rhinorrhea: YES    Congestion: YES    Sore Throat: YES- slight      Cough: YES--productive     Wheeze: YES    Decreased Appetite: no    Nausea: no    Vomiting: no    Diarrhea:  no    Dysuria/Freq.: no    Fatigue/Achiness: YES- tired all the time    Sick/Strep Exposure: no     Therapies Tried and outcome: excedrin    No hx of seasonal allergies.       Problem list and histories reviewed & adjusted, as indicated.  Additional history: as documented    No current outpatient prescriptions on file.     Allergies   Allergen Reactions     Levaquin      Shrimp      throat swells     Sulfa Drugs Rash     rash       Reviewed and updated as needed this visit by clinical staff  Tobacco  Allergies  Meds  Med Hx  Surg Hx  Fam Hx  Soc Hx      Reviewed and updated as needed this visit by Provider         ROS:  Constitutional, HEENT, cardiovascular, pulmonary, gi and gu systems are negative, except as otherwise noted.    OBJECTIVE:     /80 (BP Location: Right arm, Patient Position: Chair, Cuff Size: Adult Large)  Pulse 90  Temp 98.7  F (37.1  C) (Oral)  Resp 16  Wt 245 lb (111.1 kg)  LMP 09/11/2005  SpO2 98%  BMI 39.54 kg/m2  Body mass index is 39.54 kg/(m^2).  GENERAL: healthy, alert and no distress  EYES: Eyes grossly normal to inspection and conjunctivae and sclerae normal  HENT: ear canals normal and TM's mild serous effusion, nose and mouth without ulcers or lesions, nasal mucosa congested, bilat maxillary sinus tenderness, MMM  NECK: no adenopathy, no  asymmetry, masses, or scars and thyroid normal to palpation  RESP: lungs clear to auscultation - no rales, rhonchi or wheezes, normal effort  CV: regular rate and rhythm, normal S1 S2, no S3 or S4, no murmur  MS: no gross musculoskeletal defects noted  SKIN: no suspicious lesions or rashes    Diagnostic Test Results:  none     ASSESSMENT/PLAN:   1. Acute sinusitis with symptoms > 10 days  OTC expectorant to help loosen phlegm, plenty of fluids, steam shower.    - amoxicillin-clavulanate (AUGMENTIN) 875-125 MG per tablet; Take 1 tablet by mouth 2 times daily for 10 days  Dispense: 20 tablet; Refill: 0    F/u as needed if no improvement or worsening symptoms     DUTCH Polanco [facility]

## 2018-05-14 NOTE — MR AVS SNAPSHOT
"              After Visit Summary   2018    Rich Terry    MRN: [id number]           Patient Information     Date Of Birth          1955        Visit Information        Provider Department      2018 10:40 AM Brittny Fraire APRN CNP [facility]        Today's Diagnoses     Acute sinusitis with symptoms > 10 days    -  1       Follow-ups after your visit        Follow-up notes from your care team     Return in about 4 weeks (around 2018) for Physical Exam .      Who to contact     If you have questions or need follow up information about today's clinic visit or your schedule please contact [facility] directly at [phone].  Normal or non-critical lab and imaging results will be communicated to you by MyChart, letter or phone within 4 business days after the clinic has received the results. If you do not hear from us within 7 days, please contact the clinic through MyChart or phone. If you have a critical or abnormal lab result, we will notify you by phone as soon as possible.  Submit refill requests through [facility] or call your pharmacy and they will forward the refill request to us. Please allow 3 business days for your refill to be completed.          Additional Information About Your Visit        MyChart Information     [facility] lets you send messages to your doctor, view your test results, renew your prescriptions, schedule appointments and more. To sign up, go to [email or web address] . Click on \"Log in\" on the left side of the screen, which will take you to the Welcome page. Then click on \"Sign up Now\" on the right side of the page.     You will be asked to enter the access code listed below, as well as some personal information. Please follow the directions to create your username and password.     Your access code is: [email or web address]  Expires: 2018 10:11 AM     Your access code will  in 90 days. If you need help or a new code, please " call your [facility] clinic or [phone].        Care EveryWhere ID     This is your Care EveryWhere ID. This could be used by other organizations to access your [facility] medical records  [id number]        Your Vitals Were     Pulse Temperature Respirations Last Period Pulse Oximetry BMI (Body Mass Index)    90 98.7  F (37.1  C) (Oral) 16 09/11/2005 98% 39.54 kg/m2       Blood Pressure from Last 3 Encounters:   05/14/18 128/80   03/16/18 98/60   10/30/17 128/82    Weight from Last 3 Encounters:   05/14/18 245 lb (111.1 kg)   03/16/18 239 lb (108.4 kg)   10/30/17 240 lb (108.9 kg)              Today, you had the following     No orders found for display         Today's Medication Changes          These changes are accurate as of 5/14/18 11:12 AM.  If you have any questions, ask your nurse or doctor.               Start taking these medicines.        Dose/Directions    amoxicillin-clavulanate 875-125 MG per tablet   Commonly known as:  AUGMENTIN   Used for:  Acute sinusitis with symptoms > 10 days   Started by:  Brittny Fraire APRN CNP        Dose:  1 tablet   Take 1 tablet by mouth 2 times daily for 10 days   Quantity:  20 tablet   Refills:  0            Where to get your medicines      These medications were sent to [facility] [address] [place] [address] AT [address] [address]     Phone:  [phone]     amoxicillin-clavulanate 875-125 MG per tablet                Primary Care Provider Office Phone # Fax #    Ragini Stevens [phone]       [id number]  [address]   [facility] [address]        Equal Access to Services     SOLANGE WORKMAN AH: Ellyn Darling, mellissa greenwood, qadc kaalmada denice, cheo tate. Yuridia [facility] [phone].    ATENCIÓN: Si habla español, tiene a hoffman disposición servicios gratuitos de asistencia lingüística. Llame al [phone].    We  comply with applicable federal civil rights laws and [place] laws. We do not discriminate on the basis of race, color, national origin, age, disability, sex, sexual orientation, or gender identity.            Thank you!     Thank you for choosing [facility]  for your care. Our goal is always to provide you with excellent care. Hearing back from our patients is one way we can continue to improve our services. Please take a few minutes to complete the written survey that you may receive in the mail after your visit with us. Thank you!             Your Updated Medication List - Protect others around you: Learn how to safely use, store and throw away your medicines at www.disposemymeds.org.          This list is accurate as of 5/14/18 11:12 AM.  Always use your most recent med list.                   Brand Name Dispense Instructions for use Diagnosis    amoxicillin-clavulanate 875-125 MG per tablet    AUGMENTIN    20 tablet    Take 1 tablet by mouth 2 times daily for 10 days    Acute sinusitis with symptoms > 10 days

## 2018-06-11 ENCOUNTER — OFFICE VISIT (OUTPATIENT)
Dept: FAMILY MEDICINE | Facility: CLINIC | Age: 63
End: 2018-06-11
Payer: COMMERCIAL

## 2018-06-11 ENCOUNTER — RADIANT APPOINTMENT (OUTPATIENT)
Dept: GENERAL RADIOLOGY | Facility: CLINIC | Age: 63
End: 2018-06-11
Attending: FAMILY MEDICINE
Payer: COMMERCIAL

## 2018-06-11 VITALS
WEIGHT: 242.4 LBS | RESPIRATION RATE: 20 BRPM | TEMPERATURE: 97.9 F | SYSTOLIC BLOOD PRESSURE: 128 MMHG | BODY MASS INDEX: 36.74 KG/M2 | HEIGHT: 68 IN | HEART RATE: 80 BPM | OXYGEN SATURATION: 99 % | DIASTOLIC BLOOD PRESSURE: 80 MMHG

## 2018-06-11 DIAGNOSIS — E66.01 MORBID OBESITY (H): ICD-10-CM

## 2018-06-11 DIAGNOSIS — R06.02 SOB (SHORTNESS OF BREATH): ICD-10-CM

## 2018-06-11 DIAGNOSIS — L57.0 AK (ACTINIC KERATOSIS): ICD-10-CM

## 2018-06-11 DIAGNOSIS — Z12.4 SCREENING FOR MALIGNANT NEOPLASM OF CERVIX: ICD-10-CM

## 2018-06-11 DIAGNOSIS — K21.9 GASTROESOPHAGEAL REFLUX DISEASE, ESOPHAGITIS PRESENCE NOT SPECIFIED: ICD-10-CM

## 2018-06-11 DIAGNOSIS — Z11.4 SCREENING FOR HIV (HUMAN IMMUNODEFICIENCY VIRUS): ICD-10-CM

## 2018-06-11 DIAGNOSIS — Z12.11 SCREEN FOR COLON CANCER: ICD-10-CM

## 2018-06-11 DIAGNOSIS — Z78.0 ASYMPTOMATIC POSTMENOPAUSAL STATUS: ICD-10-CM

## 2018-06-11 DIAGNOSIS — Z00.00 ROUTINE GENERAL MEDICAL EXAMINATION AT A HEALTH CARE FACILITY: Primary | ICD-10-CM

## 2018-06-11 DIAGNOSIS — M67.449 MUCOUS CYST OF FINGER: ICD-10-CM

## 2018-06-11 LAB
ERYTHROCYTE [DISTWIDTH] IN BLOOD BY AUTOMATED COUNT: 13.7 % (ref 10–15)
HCT VFR BLD AUTO: 43.8 % (ref 35–47)
HGB BLD-MCNC: 14.6 G/DL (ref 11.7–15.7)
MCH RBC QN AUTO: 31.3 PG (ref 26.5–33)
MCHC RBC AUTO-ENTMCNC: 33.3 G/DL (ref 31.5–36.5)
MCV RBC AUTO: 94 FL (ref 78–100)
PLATELET # BLD AUTO: 320 10E9/L (ref 150–450)
RBC # BLD AUTO: 4.67 10E12/L (ref 3.8–5.2)
WBC # BLD AUTO: 7.7 10E9/L (ref 4–11)

## 2018-06-11 PROCEDURE — 87624 HPV HI-RISK TYP POOLED RSLT: CPT | Performed by: FAMILY MEDICINE

## 2018-06-11 PROCEDURE — G0145 SCR C/V CYTO,THINLAYER,RESCR: HCPCS | Performed by: FAMILY MEDICINE

## 2018-06-11 PROCEDURE — 80053 COMPREHEN METABOLIC PANEL: CPT | Performed by: FAMILY MEDICINE

## 2018-06-11 PROCEDURE — 36415 COLL VENOUS BLD VENIPUNCTURE: CPT | Performed by: FAMILY MEDICINE

## 2018-06-11 PROCEDURE — 84443 ASSAY THYROID STIM HORMONE: CPT | Performed by: FAMILY MEDICINE

## 2018-06-11 PROCEDURE — 82306 VITAMIN D 25 HYDROXY: CPT | Performed by: FAMILY MEDICINE

## 2018-06-11 PROCEDURE — 17000 DESTRUCT PREMALG LESION: CPT | Performed by: FAMILY MEDICINE

## 2018-06-11 PROCEDURE — 99396 PREV VISIT EST AGE 40-64: CPT | Mod: 25 | Performed by: FAMILY MEDICINE

## 2018-06-11 PROCEDURE — 99213 OFFICE O/P EST LOW 20 MIN: CPT | Mod: 25 | Performed by: FAMILY MEDICINE

## 2018-06-11 PROCEDURE — 85027 COMPLETE CBC AUTOMATED: CPT | Performed by: FAMILY MEDICINE

## 2018-06-11 PROCEDURE — 80061 LIPID PANEL: CPT | Performed by: FAMILY MEDICINE

## 2018-06-11 PROCEDURE — 87389 HIV-1 AG W/HIV-1&-2 AB AG IA: CPT | Performed by: FAMILY MEDICINE

## 2018-06-11 PROCEDURE — 71046 X-RAY EXAM CHEST 2 VIEWS: CPT | Mod: FY

## 2018-06-11 NOTE — PROGRESS NOTES
SUBJECTIVE:   CC: Rich Terry is an 63 year old woman who presents for preventive health visit.     Physical   Annual:     Getting at least 3 servings of Calcium per day::  NO    Bi-annual eye exam::  Yes    Dental care twice a year::  NO    Sleep apnea or symptoms of sleep apnea::  None    Diet::  Other    Frequency of exercise::  2-3 days/week    Duration of exercise::  30-45 minutes    Taking medications regularly::  Yes    Medication side effects::  Not applicable    Additional concerns today::  YES            DRY FLAKEY DISCOLORED SPOT ON RIGHT BREAST:     WART(S)  Onset: 1 YEAR     Description:   Location: RIGHT MIDDLE FINGER  Number of warts: 1  Painful: YES- SOMETIMES IF ITS BUMPED    Accompanying Signs & Symptoms:  Signs of infection: no    History:   History of trauma: no  Prior warts: no    Therapies Tried and outcome: OTC meds-has NOT helped     She has tried freezing the skin lesion on her 3rd right finger 3x with OTC medication but it has not gone away. It will be tender when it has been soaking in water for a period of time. The lesion has been present for about a year.     SOB  Patient reports that she had influenza B in March. By the time she was seen she was sick for almost 2 weeks. Notes that she was ill for about 8 weeks because she also developed a sinus infection. She has never received the flu vaccine. She comments that she was very sick and the flu really wiped her out, she could barely get out of bed to use the restroom.     Patient reports that ever since then she has continued to be SOB. The antibiotics she was given helped improve the cough but not the SOB. She now has to always stop to catch her breath when going up the stairs or walking around the grocery store. This is a new symptom for her. Denies any chest pain. She may feel wheezy when feeling SOB.     She had a chest x-ray performed 7/20/2017 due to a cough.    Notes that her ears will itch- she reports hearing a ticking  "noise in her ears. She notes some sneezing. She does not have a hx of allergies.     Derm  Notes a patch of dry and discolored skin on her breast that has been present for over a year. It has not grown in size or worsened.  She also notes a rough patch of skin by her right shoulder. Notes that she worked out in the sun all her life. Recalls that she developed blisters on her back at one point.     GERD  Patient reports experiencing a great amount of heartburn, so she \"eats OTC Tums like candy\". She tries not to drink coffee often. Drinks alcohol about 1-2x a month. A few days ago she cut out soda and notes no improvement in symptoms. She loves eating spicy food and has tried cutting down on eating it so often. She has tried taking omeprazole before but did not notice any relief. She has now started taking rolaids which seem to provide more relief than Tums.     Colon cancer screening  She wishes to defer the colonoscopy. She would rather have a FIT test. Denies any family hx of colon cancer.     Diet and exercise  She recently started riding her bike again. She has not done so in many years.     Other problems to add on...  -Patient is no longer working- it is still a bit of an adjustment for her. Notes that her son and his family moved out, but now her daughter and her family moved in. They will be moving out soon as they are in the process of buying a house. Overall everything is going well, everyone gets along. Patient relates that she likes having everyone in her house.       Today's PHQ-2 Score:   PHQ-2 ( 1999 Pfizer) 6/11/2018   Q1: Little interest or pleasure in doing things 0   Q2: Feeling down, depressed or hopeless 0   PHQ-2 Score 0   Q1: Little interest or pleasure in doing things Not at all   Q2: Feeling down, depressed or hopeless Not at all   PHQ-2 Score 0     Abuse: Current or Past(Physical, Sexual or Emotional)- NO  Do you feel safe in your environment - YES    Social History   Substance Use Topics "     Smoking status: Former Smoker     Smokeless tobacco: Never Used      Comment: quit 1980     Alcohol use 0.0 oz/week     0 Standard drinks or equivalent per week      Comment: 4 drinks a month     Alcohol Use 6/11/2018   If you drink alcohol do you typically have greater than 3 drinks per day OR greater than 7 drinks per week? No   No flowsheet data found.    Reviewed orders with patient.  Reviewed health maintenance and updated orders accordingly - Yes  Labs reviewed in EPIC  BP Readings from Last 3 Encounters:   06/11/18 128/80   05/14/18 128/80   03/16/18 98/60    Wt Readings from Last 3 Encounters:   06/11/18 110 kg (242 lb 6.4 oz)   05/14/18 111.1 kg (245 lb)   03/16/18 108.4 kg (239 lb)         Recent Labs   Lab Test  05/10/16   0812  11/03/15   0600  10/26/15   0852  06/02/15   0809  10/29/13   1015  08/03/12   0929   A1C   --    --   6.0  6.2*   --    --    LDL   --    --    --   123   --   138*   HDL   --    --    --   86   --   79   TRIG   --    --    --   96   --   76   ALT  25   --    --   29  31   --    CR  0.84  0.82   --   0.82  0.85   --    GFRESTIMATED  68  71   --   71  69   --    GFRESTBLACK  83  86   --   85  83   --    POTASSIUM  3.5   --    --   4.4  4.3   --    TSH   --    --    --   2.63  1.56   --       Patient over age 50, mutual decision to screen reflected in health maintenance.    Pertinent mammograms are reviewed under the imaging tab.  History of abnormal Pap smear:   NO - age 30- 65 PAP every 3 years recommended  Last 3 Pap Results:   PAP (no units)   Date Value   06/02/2015 NIL   08/03/2012 NIL   12/08/2005 NIL       Reviewed and updated as needed this visit by clinical staff  Tobacco  Allergies  Meds  Problems  Med Hx  Surg Hx  Fam Hx  Soc Hx          Reviewed and updated as needed this visit by Provider        Review of Systems  CONSTITUTIONAL: NEGATIVE for fever, chills, change in weight  INTEGUMENTARY/SKIN: POSITIVE for skin lesion on 3rd right finger and rough patch of  "skin on chest and right shoulder. NEGATIVE for worrisome rashes, moles or lesions  EYES: NEGATIVE for vision changes or irritation  ENT: NEGATIVE for ear, mouth and throat problems  RESP: POSITIVE for SOB with exertion NEGATIVE for significant cough   BREAST: NEGATIVE for masses, tenderness or discharge  CV: NEGATIVE for chest pain, palpitations or peripheral edema  GI: POSITIVE for heartburn NEGATIVE for nausea, abdominal pain, or change in bowel habits  : NEGATIVE for unusual urinary or vaginal symptoms. No vaginal bleeding.  MUSCULOSKELETAL: NEGATIVE for significant arthralgias or myalgia  NEURO: NEGATIVE for weakness, dizziness or paresthesias  PSYCHIATRIC: NEGATIVE for changes in mood or affect      This document serves as a record of the services and decisions personally performed and made by Ragini Stevens MD. It was created on her behalf by Moriah Su, a trained medical scribe. The creation of this document is based on the provider's statements to the medical scribe.  Moriah Su June 11, 2018 9:14 AM     OBJECTIVE:   /80 (BP Location: Right arm, Patient Position: Chair, Cuff Size: Adult Large)  Pulse 80  Temp 97.9  F (36.6  C) (Oral)  Resp 20  Ht 5' 7.75\" (1.721 m)  Wt 242 lb 6.4 oz (110 kg)  LMP 09/11/2005  SpO2 99%  BMI 37.13 kg/m2     Physical Exam  GENERAL APPEARANCE: healthy, alert and no distress  EYES: Eyes grossly normal to inspection, PERRL and conjunctivae and sclerae normal  HENT: ear canals and TM's normal, nose and mouth without ulcers or lesions, oropharynx clear and oral mucous membranes moist  NECK: no adenopathy, no asymmetry, masses, or scars and thyroid normal to palpation  RESP: lungs clear to auscultation - no rales, rhonchi or wheezes  BREAST: normal without masses, tenderness or nipple discharge and no palpable axillary masses or adenopathy  CV: regular rate and rhythm, normal S1 S2, no S3 or S4, no murmur, click or rub, no peripheral edema and peripheral " pulses strong  ABDOMEN: soft, nontender, no hepatosplenomegaly, no masses and bowel sounds normal   (female): normal female external genitalia, normal urethral meatus, vaginal mucosal atrophy noted, normal cervix, adnexae, and uterus without masses or abnormal discharge  MS: no musculoskeletal defects are noted and gait is age appropriate without ataxia  SKIN: SK on chest and right shoulder. Pre-cancerous looking skin lesion on near the right wrist, small , rough, raised  Finger with cyst like mass near the PIP joint  NEURO: Normal strength and tone, sensory exam grossly normal, mentation intact and speech normal  PSYCH: mentation appears normal and affect normal/bright    ASSESSMENT/PLAN:   (Z00.00) Routine general medical examination at a health care facility  (primary encounter diagnosis)  Comment: Fasting lab work up today  Obesity discussed, makes GERD worse and maybe part of the SOB, deconditioning, pt working on weight loss  Plan: Lipid panel reflex to direct LDL Fasting,         Comprehensive metabolic panel, TSH with free T4        reflex, CBC with platelets, Vitamin D         Deficiency, MA Screening Digital Bilateral          AK noted on wrist  Comment:Liquid nitrogen was applied for 10 seconds x 3 cycles to the skin lesions and the expected blistering or scabbing reaction explained. Do not pick at the areas. Patient reminded to expect hypopigmented scars from the procedure. Return if lesions fail to fully resolve. Referred to dermatology to have cyst inspected + skin check  Plan: DERMATOLOGY REFERRAL  Cyst on finger, likely benign, pt thought it was a wart, she has been treating this at home with LN-but it is not working, skin lesion appears to be a cyst rather than wart          (R06.02) SOB (shortness of breath)  Comment: Breathing and lungs normal upon exam today. Chest x-ray ordered today. If imaging is normal, will continue to monitor symptoms. If continues to be SOB, pt will Follow up   Plan: XR  "Chest 2 Views          (K21.9) Gastroesophageal reflux disease, esophagitis presence not specified  Comment: Continue with avoiding trigger foods (coffee, alcohol, spicy food, etc). Offered omeprazole 40 mg- patient declined. She prefers to continue with OTC Rolaids prn    (Z12.4) Screening for malignant neoplasm of cervix  Comment: Pap smear performed today. Normal pelvic exam  Plan: Pap imaged thin layer screen with HPV -         recommended age 30 - 65 years (select HPV order        below), HPV High Risk Types DNA Cervical          (Z78.0) Asymptomatic postmenopausal status  Comment: Ordered today  Plan: DEXA HIP/PELVIS/SPINE - Future          (Z11.4) Screening for HIV (human immunodeficiency virus)  Plan: HIV Screening          (Z12.11) Screen for colon cancer  Comment: Recommend colonoscopy, patient prefers FIT test  Plan: Fecal colorectal cancer screen FIT - Future         (S+30)          Follow up in 1 year    COUNSELING:  Reviewed preventive health counseling, as reflected in patient instructions       Regular exercise       Healthy diet/nutrition       Colon cancer screening     reports that she has quit smoking. She has never used smokeless tobacco.  Estimated body mass index is 37.13 kg/(m^2) as calculated from the following:    Height as of this encounter: 5' 7.75\" (1.721 m).    Weight as of this encounter: 242 lb 6.4 oz (110 kg).   Weight management plan: Discussed healthy diet and exercise guidelines and patient will follow up in 12 months in clinic to re-evaluate.    Counseling Resources:  ATP IV Guidelines  Pooled Cohorts Equation Calculator  Breast Cancer Risk Calculator  FRAX Risk Assessment  ICSI Preventive Guidelines  Dietary Guidelines for Americans, 2010  USDA's MyPlate  ASA Prophylaxis  Lung CA Screening    The information in this document, created by the medical scribe for me, accurately reflects the services I personally performed and the decisions made by me. I have reviewed and approved " this document for accuracy prior to leaving the patient care area.  June 11, 2018 9:14 AM    Ragini Stevens MD  [facility]

## 2018-06-11 NOTE — MR AVS SNAPSHOT
After Visit Summary   6/11/2018    Rich Terry    MRN: [id number]           Patient Information     Date Of Birth          1955        Visit Information        Provider Department      6/11/2018 8:40 AM Ragini Stevens MD [facility]        Today's Diagnoses     Routine general medical examination at a health care facility    -  1    Mucous cyst of finger        SOB (shortness of breath)        Gastroesophageal reflux disease, esophagitis presence not specified        Screening for malignant neoplasm of cervix        Asymptomatic postmenopausal status        Screening for HIV (human immunodeficiency virus)        Screen for colon cancer          Care Instructions      Preventive Health Recommendations  Female Ages 50 - 64    Yearly exam: See your health care provider every year in order to  o Review health changes.   o Discuss preventive care.    o Review your medicines if your doctor has prescribed any.      Get a Pap test every three years (unless you have an abnormal result and your provider advises testing more often).    If you get Pap tests with HPV test, you only need to test every 5 years, unless you have an abnormal result.     You do not need a Pap test if your uterus was removed (hysterectomy) and you have not had cancer.    You should be tested each year for STDs (sexually transmitted diseases) if you're at risk.     Have a mammogram every 1 to 2 years.    Have a colonoscopy at age 50, or have a yearly FIT test (stool test). These exams screen for colon cancer.      Have a cholesterol test every 5 years, or more often if advised.    Have a diabetes test (fasting glucose) every three years. If you are at risk for diabetes, you should have this test more often.     If you are at risk for osteoporosis (brittle bone disease), think about having a bone density scan (DEXA).    Shots: Get a flu shot each year. Get a tetanus shot every 10 years.    Nutrition:     Eat  at least 5 servings of fruits and vegetables each day.    Eat whole-grain bread, whole-wheat pasta and brown rice instead of white grains and rice.    Talk to your provider about Calcium and Vitamin D.     Lifestyle    Exercise at least 150 minutes a week (30 minutes a day, 5 days a week). This will help you control your weight and prevent disease.    Limit alcohol to one drink per day.    No smoking.     Wear sunscreen to prevent skin cancer.     See your dentist every six months for an exam and cleaning.    See your eye doctor every 1 to 2 years.            Follow-ups after your visit        Additional Services     DERMATOLOGY REFERRAL       Your provider has referred you to: [facility]: [facility] [place] [phone]    Please be aware that coverage of these services is subject to the terms and limitations of your health insurance plan.  Call member services at your health plan with any benefit or coverage questions.      Please bring the following with you to your appointment:    (1) Any X-Rays, CTs or MRIs which have been performed.  Contact the facility where they were done to arrange for  prior to your scheduled appointment.    (2) List of current medications  (3) This referral request   (4) Any documents/labs given to you for this referral                  Follow-up notes from your care team     Return in about 1 year (around 6/11/2019) for wellness exam.      Your next 10 appointments already scheduled     Jun 11, 2018  9:35 AM CDT   XR CHEST 2 VIEWS with [facility]   [facility]    [id number] [place] [address], [address]  [facility] [address] [phone]           Please bring a list of your current medicines to your exam. (Include vitamins, minerals and over-thecounter medicines.) Leave your valuables at home.  Tell your doctor if there is a chance you may be pregnant.  You do not need to do anything special for this exam.              Future  "tests that were ordered for you today     Open Future Orders        Priority Expected Expires Ordered    DEXA HIP/PELVIS/SPINE - Future Routine  2019    Fecal colorectal cancer screen FIT - Future (S+30) Routine 2018    MA Screening Digital Bilateral Routine  2019            Who to contact     If you have questions or need follow up information about today's clinic visit or your schedule please contact [facility] directly at [phone].  Normal or non-critical lab and imaging results will be communicated to you by [employer], letter or phone within 4 business days after the clinic has received the results. If you do not hear from us within 7 days, please contact the clinic through [employer] or phone. If you have a critical or abnormal lab result, we will notify you by phone as soon as possible.  Submit refill requests through [employer] or call your pharmacy and they will forward the refill request to us. Please allow 3 business days for your refill to be completed.          Additional Information About Your Visit        [employer] Information     [employer] lets you send messages to your doctor, view your test results, renew your prescriptions, schedule appointments and more. To sign up, go to [email or web address] . Click on \"Log in\" on the left side of the screen, which will take you to the Welcome page. Then click on \"Sign up Now\" on the right side of the page.     You will be asked to enter the access code listed below, as well as some personal information. Please follow the directions to create your username and password.     Your access code is: [email or web address]  Expires: 2018 10:11 AM     Your access code will  in 90 days. If you need help or a new code, please call your [facility] or [phone].        Care EveryWhere ID     This is your Care EveryWhere ID. This could be used by other organizations to access your [place] medical " "records  [id number]        Your Vitals Were     Pulse Temperature Respirations Height Last Period Pulse Oximetry    80 97.9  F (36.6  C) (Oral) 20 5' 7.75\" (1.721 m) 09/11/2005 99%    BMI (Body Mass Index)                   37.13 kg/m2            Blood Pressure from Last 3 Encounters:   06/11/18 128/80   05/14/18 128/80   03/16/18 98/60    Weight from Last 3 Encounters:   06/11/18 242 lb 6.4 oz (110 kg)   05/14/18 245 lb (111.1 kg)   03/16/18 239 lb (108.4 kg)              We Performed the Following     CBC with platelets     Comprehensive metabolic panel     DERMATOLOGY REFERRAL     HIV Screening     HPV High Risk Types DNA Cervical     Lipid panel reflex to direct LDL Fasting     Pap imaged thin layer screen with HPV - recommended age 30 - 65 years (select HPV order below)     TSH with free T4 reflex     Vitamin D Deficiency        Primary Care Provider Office Phone # Fax #    Ragini Niru Stevens [phone]       [id number]   [facility] [address]        Equal Access to Services     [facility] : Hadii sheba gomes hadasho Soarnold, waaxda luqadaha, qaybta kaalmada denice, cheo tate. So [facility] [phone].    ATENCIÓN: Si habla español, tiene a hoffman disposición servicios gratuitos de asistencia lingüística. Edson al [phone].    We comply with applicable federal civil rights laws and [place] laws. We do not discriminate on the basis of race, color, national origin, age, disability, sex, sexual orientation, or gender identity.            Thank you!     Thank you for choosing [facility]  for your care. Our goal is always to provide you with excellent care. Hearing back from our patients is one way we can continue to improve our services. Please take a few minutes to complete the written survey that you may receive in the mail after your visit with us. Thank you!             Your Updated Medication List - Protect others around you: " Learn how to safely use, store and throw away your medicines at www.disposemymeds.org.      Notice  As of 6/11/2018  9:33 AM    You have not been prescribed any medications.

## 2018-06-11 NOTE — LETTER
[facility]  June 14, 2018       [place] [address]           Phone :  [phone]          Fax:  [phone]  Rich Terry  [address] [facility] [address]      Yon Villanueva,    Normal electrolyte panel. The sodium, potassium, sugar and kidneys are all normal. Your Thyroid test, or TSH , is normal.   Your cholesterol tests are normal.   HIV is negative  Your vitamin D level is normal. Please take the recommended dose of Vitamin D3, 1000u per day.   Your cbc is normal, showing no anemia or signs of infection.    Please call the clinic with more information or any questions.    Sincerely,        [facility], MD/shea

## 2018-06-11 NOTE — LETTER
June 21, 2018    Rich Terry  [address] [address]    Dear Rich,  We are happy to inform you that your PAP smear result from 06/11/18 is normal.  We are now able to do a follow up test on PAP smears. The DNA test is for HPV (Human Papilloma Virus). Cervical cancer is closely linked with certain types of HPV. Your results showed no evidence of high risk HPV.  Therefore we recommend you return in 3 years for your next pap smear.  You will still need to return to the clinic every year for an annual exam and other preventive tests.  Please contact the clinic at [phone] with any questions.  Sincerely,    Ragini Stevens MD/[facility]

## 2018-06-11 NOTE — NURSING NOTE
"Chief Complaint   Patient presents with     Physical     Physical and Pap     Blood Draw     LABS.  Patient is fasting     Initial /80 (BP Location: Right arm, Patient Position: Chair, Cuff Size: Adult Large)  Pulse 80  Temp 97.9  F (36.6  C) (Oral)  Resp 20  Ht 5' 7.75\" (1.721 m)  Wt 242 lb 6.4 oz (110 kg)  LMP 09/11/2005  SpO2 99%  BMI 37.13 kg/m2 Estimated body mass index is 37.13 kg/(m^2) as calculated from the following:    Height as of this encounter: 5' 7.75\" (1.721 m).    Weight as of this encounter: 242 lb 6.4 oz (110 kg).  BP completed using cuff size large right arm    Lisa Magill, CMA    "

## 2018-06-12 LAB
ALBUMIN SERPL-MCNC: 3.7 G/DL (ref 3.4–5)
ALP SERPL-CCNC: 112 U/L (ref 40–150)
ALT SERPL W P-5'-P-CCNC: 34 U/L (ref 0–50)
ANION GAP SERPL CALCULATED.3IONS-SCNC: 13 MMOL/L (ref 3–14)
AST SERPL W P-5'-P-CCNC: 42 U/L (ref 0–45)
BILIRUB SERPL-MCNC: 0.3 MG/DL (ref 0.2–1.3)
BUN SERPL-MCNC: 10 MG/DL (ref 7–30)
CALCIUM SERPL-MCNC: 9.7 MG/DL (ref 8.5–10.1)
CHLORIDE SERPL-SCNC: 109 MMOL/L (ref 94–109)
CHOLEST SERPL-MCNC: 205 MG/DL
CO2 SERPL-SCNC: 22 MMOL/L (ref 20–32)
CREAT SERPL-MCNC: 0.81 MG/DL (ref 0.52–1.04)
DEPRECATED CALCIDIOL+CALCIFEROL SERPL-MC: 41 UG/L (ref 20–75)
GFR SERPL CREATININE-BSD FRML MDRD: 71 ML/MIN/1.7M2
GLUCOSE SERPL-MCNC: 96 MG/DL (ref 70–99)
HDLC SERPL-MCNC: 90 MG/DL
HIV 1+2 AB+HIV1 P24 AG SERPL QL IA: NONREACTIVE
LDLC SERPL CALC-MCNC: 99 MG/DL
NONHDLC SERPL-MCNC: 115 MG/DL
POTASSIUM SERPL-SCNC: 4.7 MMOL/L (ref 3.4–5.3)
PROT SERPL-MCNC: 7.6 G/DL (ref 6.8–8.8)
SODIUM SERPL-SCNC: 144 MMOL/L (ref 133–144)
TRIGL SERPL-MCNC: 82 MG/DL
TSH SERPL DL<=0.005 MIU/L-ACNC: 1.45 MU/L (ref 0.4–4)

## 2018-06-13 LAB
COPATH REPORT: NORMAL
PAP: NORMAL

## 2018-06-15 LAB
FINAL DIAGNOSIS: NORMAL
HPV HR 12 DNA CVX QL NAA+PROBE: NEGATIVE
HPV16 DNA SPEC QL NAA+PROBE: NEGATIVE
HPV18 DNA SPEC QL NAA+PROBE: NEGATIVE
SPECIMEN DESCRIPTION: NORMAL
SPECIMEN SOURCE CVX/VAG CYTO: NORMAL

## 2018-06-26 ENCOUNTER — OFFICE VISIT (OUTPATIENT)
Dept: DERMATOLOGY | Facility: CLINIC | Age: 63
End: 2018-06-26
Payer: COMMERCIAL

## 2018-06-26 VITALS — HEART RATE: 74 BPM | OXYGEN SATURATION: 98 % | DIASTOLIC BLOOD PRESSURE: 80 MMHG | SYSTOLIC BLOOD PRESSURE: 136 MMHG

## 2018-06-26 DIAGNOSIS — L81.4 LENTIGO: ICD-10-CM

## 2018-06-26 DIAGNOSIS — D18.00 ANGIOMA: ICD-10-CM

## 2018-06-26 DIAGNOSIS — L57.0 AK (ACTINIC KERATOSIS): Primary | ICD-10-CM

## 2018-06-26 DIAGNOSIS — L82.1 SEBORRHEIC KERATOSIS: ICD-10-CM

## 2018-06-26 DIAGNOSIS — I87.2 VENOUS STASIS DERMATITIS OF LEFT LOWER EXTREMITY: ICD-10-CM

## 2018-06-26 DIAGNOSIS — D22.9 NEVUS: ICD-10-CM

## 2018-06-26 DIAGNOSIS — M67.449 DIGITAL MUCOUS CYST: ICD-10-CM

## 2018-06-26 PROCEDURE — 17003 DESTRUCT PREMALG LES 2-14: CPT | Performed by: PHYSICIAN ASSISTANT

## 2018-06-26 PROCEDURE — 17000 DESTRUCT PREMALG LESION: CPT | Performed by: PHYSICIAN ASSISTANT

## 2018-06-26 PROCEDURE — 99204 OFFICE O/P NEW MOD 45 MIN: CPT | Mod: 25 | Performed by: PHYSICIAN ASSISTANT

## 2018-06-26 RX ORDER — TRIAMCINOLONE ACETONIDE 1 MG/G
OINTMENT TOPICAL
Qty: 80 G | Refills: 3 | Status: SHIPPED | OUTPATIENT
Start: 2018-06-26 | End: 2020-07-15

## 2018-06-26 NOTE — PATIENT INSTRUCTIONS
Apply OTC powder sunscreen - one available at [facility] or you can get [employer] (online)    For the right lower leg - this is venous stasis dermatitis. Use

## 2018-06-26 NOTE — PROGRESS NOTES
HPI:   Rich Terry is a 63 year old female who presents for Full skin cancer screening.  chief complaint  Last Skin Exam: none      1st Baseline: YES  Personal HX of Skin Cancer: no   Personal HX of Malignant Melanoma: no   Family HX of Skin Cancer / Malignant Melanoma: mother, not sure which type  Personal HX of Atypical Moles:   no  Risk factors: sun exposure   New / Changing lesions:yes, on right area  Social History: Recently retired as a Grower at [employer]   On review of systems, there are no further skin complaints, patient is feeling otherwise well.  See patient intake sheet.  ROS of the following were done and are negative: Constitutional, Eyes, Ears, Nose,   Mouth, Throat, Cardiovascular, Respiratory, GI, Genitourinary, Musculoskeletal,   Psychiatric, Endocrine, Allergic/Immunologic.    This document serves as a record of the services and decisions personally performed and made by Deann Castillo, MS, PA-C. It was created on her behalf by Kallie Gardner, a trained medical scribe. The creation of this document is based on the provider's statements to the medical scribe.  Kallie Gardner 12:28 PM June 26, 2018    PHYSICAL EXAM:   /80  Pulse 74  LMP 09/11/2005  SpO2 98%  Skin exam performed as follows: Type 2 skin. Mood appropriate  Alert and Oriented X 3. Well developed, well nourished in no distress.  General appearance: Normal  Head including face: Normal  Eyes: conjunctiva and lids: Normal  Mouth: Lips, teeth, gums: Normal  Neck: Normal  Chest-breast/axillae: Normal  Back: Normal  Spleen and liver: Normal  Cardiovascular: Exam of peripheral vascular system by observation for swelling, varicosities, edema: Normal  Genitalia: groin, buttocks: Normal  Extremities: digits/nails (clubbing): Normal  Eccrine and Apocrine glands: Normal  Right upper extremity: Normal  Left upper extremity: Normal  Right lower extremity: Normal  Left lower extremity: Normal  Skin:  Scalp and body hair: See below    Pt deferred exam of breasts, groin, buttocks: No    Other physical findings:  1. Multiple pigmented macules on extremities and trunk  2. Multiple pigmented macules on face, trunk and extremities  3. Multiple vascular papules on trunk, arms and legs  4. Multiple scattered keratotic plaques  5. Pink scaly papules on right lower cheek x 1, left upper lip x 1 , and left cheek x 1       Except as noted above, no other signs of skin cancer or melanoma.     ASSESSMENT/PLAN:   Benign Full skin cancer screening today. . Patient with history of none  Advised on monthly self exams and 1 year  Patient Education: Appropriate brochures given.    Multiple benign appearing nevi on arms, legs and trunk. Discussed ABCDEs of melanoma and sunscreen.   Multiple lentigos on arms, legs and trunk. Advised benign, no treatment needed.  Multiple scattered angiomas. Advised benign, no treatment needed.   Seborrheic keratosis on arms, legs and trunk. Advised benign, no treatment needed.  Actinic keratosis on right lower cheek x 1, left medial x 1, and left upper cheek x 1. As precancerous, cryosurgery performed. Advised on blistering and post-op care. Advised if not resolved in 1-2 months to return for evaluation  Digital mucous cyst- advised benign and tend to be recurrent. Discussed removal with Dr. Ocampo if bothersome. She would like to monitor for now.   Venous stasis dermatitis left lower leg - advised on diagnosis and treatment options. Discussed from sluggish venous return and from increased pressure in lower legs. Advised to start wearing compression stockings daily and to elevate the legs to the level of the heart as much as possible throughout the day. Has tried nothing in the past.--Start TAC ointment BID x 1-2 weeks when irritated then PRN only  Diffuse photodamage of skin - advised. Discussed sun protection at length. She does not like the smell of sunscreen or how much she sweats with this.  Recommend powder sunscreen available online or at [facility] [[email or web address]].         Follow-up: yearly FSE/PRN sooner    1.) Patient was asked about new and changing moles. YES  2.) Patient received a complete physical skin examination: YES  3.) Patient was counseled to perform a monthly self skin examination: YES  Scribed By:Kallie Gardner, Medical Scribe    The information in this document, created by the medical scribe for me, accurately reflects the services I personally performed and the decisions made by me. I have reviewed and approved this document for accuracy prior to leaving the patient care area.  June 26, 2018 12:45 PM    Deann Castillo MS, PA-C

## 2018-06-26 NOTE — LETTER
6/26/2018         RE: Rich Terry  [address] [facility] [address]        Dear Colleague,    Thank you for referring your patient, Rich Terry, to the [facility]. Please see a copy of my visit note below.    HPI:   Rich Terry is a 63 year old female who presents for Full skin cancer screening.  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  See patient intake sheet.  ROS of the following were done and are negative: Constitutional, Eyes, Ears, Nose,   Mouth, Throat, Cardiovascular, Respiratory, GI, Genitourinary, Musculoskeletal,   Psychiatric, Endocrine, Allergic/Immunologic.    This document serves as a record of the services and decisions personally performed and made by Deann Castillo, MS, PA-C. It was created on her behalf by Kallie Gardner, a trained medical scribe. The creation of this document is based on the provider's statements to the medical scribe.  Kallie Gardner 12:28 PM June 26, 2018    PHYSICAL EXAM:   /80  Pulse 74  LMP 09/11/2005  SpO2 98%  Skin exam performed as follows: Type 2 skin. Mood appropriate  Alert and Oriented X 3. Well developed, well nourished in no distress.  General appearance: Normal  Head including face: Normal  Eyes: conjunctiva and lids: Normal  Mouth: Lips, teeth, gums: Normal  Neck: Normal  Chest-breast/axillae: Normal  Back: Normal  Spleen and liver: Normal  Cardiovascular: Exam of peripheral vascular system by observation for swelling, varicosities,  edema: Normal  Genitalia: groin, buttocks: Normal  Extremities: digits/nails (clubbing): Normal  Eccrine and Apocrine glands: Normal  Right upper extremity: Normal  Left upper extremity: Normal  Right lower extremity: Normal  Left lower extremity: Normal  Skin: Scalp and body hair: See below    Pt deferred exam of breasts, groin, buttocks: No    Other physical findings:  1. Multiple pigmented macules on extremities and trunk  2. Multiple pigmented macules on face, trunk and extremities  3. Multiple vascular papules on trunk, arms and legs  4. Multiple scattered keratotic plaques  5. Pink scaly papules on right lower cheek x 1, left upper lip x 1 , and left cheek x 1       Except as noted above, no other signs of skin cancer or melanoma.     ASSESSMENT/PLAN:   Benign Full skin cancer screening today. . Patient with history of none  Advised on monthly self exams and 1 year  Patient Education: Appropriate brochures given.    Multiple benign appearing nevi on arms, legs and trunk. Discussed ABCDEs of melanoma and sunscreen.   Multiple lentigos on arms, legs and trunk. Advised benign, no treatment needed.  Multiple scattered angiomas. Advised benign, no treatment needed.   Seborrheic keratosis on arms, legs and trunk. Advised benign, no treatment needed.  Actinic keratosis on right lower cheek x 1, left medial x 1, and left upper cheek x 1. As precancerous, cryosurgery performed. Advised on blistering and post-op care. Advised if not resolved in 1-2 months to return for evaluation  Digital mucous cyst- advised benign and tend to be recurrent. Discussed removal with Dr. Ocampo if bothersome. She would like to monitor for now.   Venous stasis dermatitis left lower leg - advised on diagnosis and treatment options. Discussed from sluggish venous return and from increased pressure in lower legs. Advised to start wearing compression stockings daily and to elevate the legs to the level of the heart as much as possible  throughout the day. Has tried nothing in the past.--Start TAC ointment BID x 1-2 weeks when irritated then PRN only  Diffuse photodamage of skin - advised. Discussed sun protection at length. She does not like the smell of sunscreen or how much she sweats with this. Recommend powder sunscreen available online or at [facility].         Follow-up: yearly FSE/PRN sooner    1.) Patient was asked about new and changing moles. YES  2.) Patient received a complete physical skin examination: YES  3.) Patient was counseled to perform a monthly self skin examination: YES  Scribed By:Kallie Gardner, Medical Scribe    The information in this document, created by the medical scribe for me, accurately reflects the services I personally performed and the decisions made by me. I have reviewed and approved this document for accuracy prior to leaving the patient care area.  June 26, 2018 12:45 PM    Deann Castillo MS, PAMEGAN      Again, thank you for allowing me to participate in the care of your patient.        Sincerely,        Deann Castillo PA-C

## 2018-08-04 ENCOUNTER — RADIANT APPOINTMENT (OUTPATIENT)
Dept: MAMMOGRAPHY | Facility: CLINIC | Age: 63
End: 2018-08-04
Attending: FAMILY MEDICINE
Payer: COMMERCIAL

## 2018-08-04 DIAGNOSIS — Z12.31 VISIT FOR SCREENING MAMMOGRAM: ICD-10-CM

## 2018-08-04 DIAGNOSIS — Z00.00 ROUTINE GENERAL MEDICAL EXAMINATION AT A HEALTH CARE FACILITY: ICD-10-CM

## 2018-08-04 PROCEDURE — 77067 SCR MAMMO BI INCL CAD: CPT | Mod: TC

## 2018-08-21 ENCOUNTER — OFFICE VISIT (OUTPATIENT)
Dept: FAMILY MEDICINE | Facility: CLINIC | Age: 63
End: 2018-08-21
Payer: COMMERCIAL

## 2018-08-21 VITALS
WEIGHT: 241 LBS | RESPIRATION RATE: 12 BRPM | HEART RATE: 90 BPM | BODY MASS INDEX: 36.91 KG/M2 | DIASTOLIC BLOOD PRESSURE: 68 MMHG | TEMPERATURE: 98.1 F | SYSTOLIC BLOOD PRESSURE: 106 MMHG

## 2018-08-21 DIAGNOSIS — R21 RASH AND NONSPECIFIC SKIN ERUPTION: Primary | ICD-10-CM

## 2018-08-21 LAB
BASOPHILS # BLD AUTO: 0.1 10E9/L (ref 0–0.2)
BASOPHILS NFR BLD AUTO: 0.7 %
DIFFERENTIAL METHOD BLD: NORMAL
EOSINOPHIL # BLD AUTO: 0.2 10E9/L (ref 0–0.7)
EOSINOPHIL NFR BLD AUTO: 2.3 %
ERYTHROCYTE [DISTWIDTH] IN BLOOD BY AUTOMATED COUNT: 13.5 % (ref 10–15)
ERYTHROCYTE [SEDIMENTATION RATE] IN BLOOD BY WESTERGREN METHOD: 15 MM/H (ref 0–30)
HCT VFR BLD AUTO: 42.4 % (ref 35–47)
HGB BLD-MCNC: 13.9 G/DL (ref 11.7–15.7)
LYMPHOCYTES # BLD AUTO: 2.5 10E9/L (ref 0.8–5.3)
LYMPHOCYTES NFR BLD AUTO: 30 %
MCH RBC QN AUTO: 30.5 PG (ref 26.5–33)
MCHC RBC AUTO-ENTMCNC: 32.8 G/DL (ref 31.5–36.5)
MCV RBC AUTO: 93 FL (ref 78–100)
MONOCYTES # BLD AUTO: 0.7 10E9/L (ref 0–1.3)
MONOCYTES NFR BLD AUTO: 8.6 %
NEUTROPHILS # BLD AUTO: 4.9 10E9/L (ref 1.6–8.3)
NEUTROPHILS NFR BLD AUTO: 58.4 %
PLATELET # BLD AUTO: 313 10E9/L (ref 150–450)
RBC # BLD AUTO: 4.56 10E12/L (ref 3.8–5.2)
WBC # BLD AUTO: 8.3 10E9/L (ref 4–11)

## 2018-08-21 PROCEDURE — 85652 RBC SED RATE AUTOMATED: CPT | Performed by: PHYSICIAN ASSISTANT

## 2018-08-21 PROCEDURE — 36415 COLL VENOUS BLD VENIPUNCTURE: CPT | Performed by: PHYSICIAN ASSISTANT

## 2018-08-21 PROCEDURE — 99214 OFFICE O/P EST MOD 30 MIN: CPT | Performed by: PHYSICIAN ASSISTANT

## 2018-08-21 PROCEDURE — 85025 COMPLETE CBC W/AUTO DIFF WBC: CPT | Performed by: PHYSICIAN ASSISTANT

## 2018-08-21 PROCEDURE — 86618 LYME DISEASE ANTIBODY: CPT | Performed by: PHYSICIAN ASSISTANT

## 2018-08-21 RX ORDER — VALACYCLOVIR HYDROCHLORIDE 1 G/1
1000 TABLET, FILM COATED ORAL 3 TIMES DAILY
Qty: 21 TABLET | Refills: 0 | Status: SHIPPED | OUTPATIENT
Start: 2018-08-21 | End: 2020-07-15

## 2018-08-21 RX ORDER — PREDNISONE 20 MG/1
TABLET ORAL
Qty: 20 TABLET | Refills: 0 | Status: SHIPPED | OUTPATIENT
Start: 2018-08-21 | End: 2020-07-15

## 2018-08-21 ASSESSMENT — ANXIETY QUESTIONNAIRES
GAD7 TOTAL SCORE: 1
6. BECOMING EASILY ANNOYED OR IRRITABLE: NOT AT ALL
3. WORRYING TOO MUCH ABOUT DIFFERENT THINGS: NOT AT ALL
5. BEING SO RESTLESS THAT IT IS HARD TO SIT STILL: NOT AT ALL
2. NOT BEING ABLE TO STOP OR CONTROL WORRYING: NOT AT ALL
IF YOU CHECKED OFF ANY PROBLEMS ON THIS QUESTIONNAIRE, HOW DIFFICULT HAVE THESE PROBLEMS MADE IT FOR YOU TO DO YOUR WORK, TAKE CARE OF THINGS AT HOME, OR GET ALONG WITH OTHER PEOPLE: NOT DIFFICULT AT ALL
1. FEELING NERVOUS, ANXIOUS, OR ON EDGE: NOT AT ALL
7. FEELING AFRAID AS IF SOMETHING AWFUL MIGHT HAPPEN: NOT AT ALL

## 2018-08-21 ASSESSMENT — PATIENT HEALTH QUESTIONNAIRE - PHQ9: 5. POOR APPETITE OR OVEREATING: SEVERAL DAYS

## 2018-08-21 NOTE — PROGRESS NOTES
"  SUBJECTIVE:   Rich Terry is a 63 year old female who presents to clinic today for the following health issues:      Rash  Onset: 8/14/18    Description:   Location: Center of chest, right side of face, neck, back of head, can feel it down into right arm  Character: round, blotchy, raised, burning, draining, red  Itching (Pruritis): YES- sometimes    Progression of Symptoms:  worsening and constant    Accompanying Signs & Symptoms:  Fever: no   Body aches or joint pain: no   Sore throat symptoms: YES- just on right side  Recent cold symptoms: no     History:   Previous similar rash: no     Precipitating factors:   Exposure to similar rash: no   New exposures: None   Recent travel: YES- just to [facility]    Alleviating factors:  nothing    Therapies Tried and outcome: benadryl, sunscreen, lotion    One week ago rash showed up on central chest, the day before right ear was hurting.  She describes it as painful, itchy and tingling.  Has since showed up on the right chin and neck.  Today the right arm is tingling and burning.  The right eye when blinking is also feeling \"strange\".    Therapies Tried and outcome: Karen/Back and Body      Problem list and histories reviewed & adjusted, as indicated.  Additional history: as documented      Reviewed and updated as needed this visit by clinical staff  Tobacco  Allergies  Meds  Med Hx  Surg Hx  Fam Hx  Soc Hx      Reviewed and updated as needed this visit by Provider         ROS:  Constitutional, HEENT, cardiovascular, pulmonary, gi and gu systems are negative, except as otherwise noted.    OBJECTIVE:     /68 (BP Location: Right arm, Patient Position: Sitting, Cuff Size: Adult Large)  Pulse 90  Temp 98.1  F (36.7  C) (Oral)  Resp 12  Wt 241 lb (109.3 kg)  LMP 09/11/2005  BMI 36.91 kg/m2  Body mass index is 36.91 kg/(m^2).  GENERAL: healthy, alert and no distress  MS: no gross musculoskeletal defects noted, no edema  SKIN: erythematous round patch, " mostly flat, no obvious vessicles noted - right neck and and upper chest  NEURO: Normal strength and tone, mentation intact and speech normal  PSYCH: mentation appears normal, affect normal/bright    Diagnostic Test Results:  Results for orders placed or performed in visit on 08/21/18   Erythrocyte sedimentation rate auto   Result Value Ref Range    Sed Rate 15 0 - 30 mm/h   CBC with platelets differential   Result Value Ref Range    WBC 8.3 4.0 - 11.0 10e9/L    RBC Count 4.56 3.8 - 5.2 10e12/L    Hemoglobin 13.9 11.7 - 15.7 g/dL    Hematocrit 42.4 35.0 - 47.0 %    MCV 93 78 - 100 fl    MCH 30.5 26.5 - 33.0 pg    MCHC 32.8 31.5 - 36.5 g/dL    RDW 13.5 10.0 - 15.0 %    Platelet Count 313 150 - 450 10e9/L    Diff Method Automated Method     % Neutrophils 58.4 %    % Lymphocytes 30.0 %    % Monocytes 8.6 %    % Eosinophils 2.3 %    % Basophils 0.7 %    Absolute Neutrophil 4.9 1.6 - 8.3 10e9/L    Absolute Lymphocytes 2.5 0.8 - 5.3 10e9/L    Absolute Monocytes 0.7 0.0 - 1.3 10e9/L    Absolute Eosinophils 0.2 0.0 - 0.7 10e9/L    Absolute Basophils 0.1 0.0 - 0.2 10e9/L   Lyme Disease [place] with reflex to WB Serum   Result Value Ref Range    Lyme Disease Antibodies Serum 0.11 0.00 - 0.89       ASSESSMENT/PLAN:   1. Rash and nonspecific skin eruption  Could be shingles possibly as part seems to be following a dermatome as far as the rash.  The other tingling symptoms though in the arm do not follow.  Will treat with prednisone and valtrex to cover possibilities.  Discussed if she breaks out in a rash near the eye she should let us know right away.  Will check some labs as well today.    - Erythrocyte sedimentation rate auto  - CBC with platelets differential  - Lyme Disease [place] with reflex to WB Serum  - valACYclovir (VALTREX) 1000 mg tablet; Take 1 tablet (1,000 mg) by mouth 3 times daily  Dispense: 21 tablet; Refill: 0  - predniSONE (DELTASONE) 20 MG tablet; Take 3 tabs (60 mg) by mouth daily x 3 days, 2 tabs (40 mg) daily x  3 days, 1 tab (20 mg) daily x 3 days, then 1/2 tab (10 mg) x 3 days.  Dispense: 20 tablet; Refill: 0        Hunter Sanford PA-C  [facility]

## 2018-08-21 NOTE — MR AVS SNAPSHOT
After Visit Summary   8/21/2018    Rich Terry    MRN: [id number]           Patient Information     Date Of Birth          1955        Visit Information        Provider Department      8/21/2018 11:00 AM Hunter Sanford PA-C [facility]        Today's Diagnoses     Rash and nonspecific skin eruption    -  1       Follow-ups after your visit        Follow-up notes from your care team     Return in about 1 week (around 8/28/2018) for Follow up.      Who to contact     If you have questions or need follow up information about today's clinic visit or your schedule please contact [facility] directly at [phone].  Normal or non-critical lab and imaging results will be communicated to you by MyChart, letter or phone within 4 business days after the clinic has received the results. If you do not hear from us within 7 days, please contact the clinic through MyChart or phone. If you have a critical or abnormal lab result, we will notify you by phone as soon as possible.  Submit refill requests through Alere or call your pharmacy and they will forward the refill request to us. Please allow 3 business days for your refill to be completed.          Additional Information About Your Visit        Care EveryWhere ID     This is your Care EveryWhere ID. This could be used by other organizations to access your [facility] medical records  [id number]        Your Vitals Were     Pulse Temperature Respirations Last Period BMI (Body Mass Index)       90 98.1  F (36.7  C) (Oral) 12 09/11/2005 36.91 kg/m2        Blood Pressure from Last 3 Encounters:   08/21/18 106/68   06/26/18 136/80   06/11/18 128/80    Weight from Last 3 Encounters:   08/21/18 241 lb (109.3 kg)   06/11/18 242 lb 6.4 oz (110 kg)   05/14/18 245 lb (111.1 kg)              We Performed the Following     CBC with platelets differential     Erythrocyte sedimentation rate auto     Lyme Disease [place] with reflex  to WB Serum          Today's Medication Changes          These changes are accurate as of 8/21/18 11:32 AM.  If you have any questions, ask your nurse or doctor.               Start taking these medicines.        Dose/Directions    predniSONE 20 MG tablet   Commonly known as:  DELTASONE   Used for:  Rash and nonspecific skin eruption   Started by:  Hunter Sanford PA-C        Take 3 tabs (60 mg) by mouth daily x 3 days, 2 tabs (40 mg) daily x 3 days, 1 tab (20 mg) daily x 3 days, then 1/2 tab (10 mg) x 3 days.   Quantity:  20 tablet   Refills:  0       valACYclovir 1000 mg tablet   Commonly known as:  VALTREX   Used for:  Rash and nonspecific skin eruption   Started by:  Hunter Sanford PA-C        Dose:  1000 mg   Take 1 tablet (1,000 mg) by mouth 3 times daily   Quantity:  21 tablet   Refills:  0            Where to get your medicines      These medications were sent to [facility] [address] [place] [address]     Phone:  [phone]     predniSONE 20 MG tablet    valACYclovir 1000 mg tablet                Primary Care Provider Office Phone # Fax #    Ragini Stevens [phone] [id number]  [address]   [facility] [address]        Equal Access to Services     SOL[employer] WORK[employer] AH: Hadii sheba ku hadasho Soomaali, waaxda luqadaha, qaybta kaalmada adeegyada, cheo tate. So [facility] [phone].    ATENCIÓN: Si habla español, tiene a hoffman disposición servicios gratuitos de asistencia lingüística. Edson al [phone].    We comply with applicable federal civil rights laws and [place] laws. We do not discriminate on the basis of race, color, national origin, age, disability, sex, sexual orientation, or gender identity.            Thank you!     Thank you for choosing [facility]  for your care. Our goal is always to provide you with  excellent care. Hearing back from our patients is one way we can continue to improve our services. Please take a few minutes to complete the written survey that you may receive in the mail after your visit with us. Thank you!             Your Updated Medication List - Protect others around you: Learn how to safely use, store and throw away your medicines at www.disposemymeds.org.          This list is accurate as of 8/21/18 11:32 AM.  Always use your most recent med list.                   Brand Name Dispense Instructions for use Diagnosis    predniSONE 20 MG tablet    DELTASONE    20 tablet    Take 3 tabs (60 mg) by mouth daily x 3 days, 2 tabs (40 mg) daily x 3 days, 1 tab (20 mg) daily x 3 days, then 1/2 tab (10 mg) x 3 days.    Rash and nonspecific skin eruption       triamcinolone 0.1 % ointment    KENALOG    80 g    Apply to AA on lower leg BID x 1-2 weeks then PRN    Venous stasis dermatitis of left lower extremity       valACYclovir 1000 mg tablet    VALTREX    21 tablet    Take 1 tablet (1,000 mg) by mouth 3 times daily    Rash and nonspecific skin eruption

## 2018-08-22 LAB — B BURGDOR IGG+IGM SER QL: 0.11 (ref 0–0.89)

## 2018-08-22 ASSESSMENT — PATIENT HEALTH QUESTIONNAIRE - PHQ9: SUM OF ALL RESPONSES TO PHQ QUESTIONS 1-9: 2

## 2018-08-22 ASSESSMENT — ANXIETY QUESTIONNAIRES: GAD7 TOTAL SCORE: 1

## 2018-08-28 ENCOUNTER — TELEPHONE (OUTPATIENT)
Dept: FAMILY MEDICINE | Facility: CLINIC | Age: 63
End: 2018-08-28

## 2018-08-28 NOTE — TELEPHONE ENCOUNTER
Notes Recorded by Hunter Sanford PA-C on 8/28/2018 at 7:29 AM  Please call Rich.  I'd like to know how she is doing.  Her labs are all normal.  See if the rash is getting better with the treatment.    Thanks,  Hunter OLIVEIRA for patient to call clinic back.  Jahaira Cordero RN

## 2018-08-28 NOTE — TELEPHONE ENCOUNTER
Patient notified of lab results and she states that the rash is getting better slowly and she is feeling a little better.  Will continue to monitor and if no improvement will follow up.  Jahaira Cordero RN

## 2018-09-24 ENCOUNTER — TELEPHONE (OUTPATIENT)
Dept: DERMATOLOGY | Facility: CLINIC | Age: 63
End: 2018-09-24

## 2018-09-24 NOTE — TELEPHONE ENCOUNTER
Patient was told she need to see a surgeon to get her digital mucocus cyst removed off her finger. She would like a referral.

## 2018-09-24 NOTE — TELEPHONE ENCOUNTER
Patient called back. Scheduled future appointment- removal of digital cyst with Dr. Ocampo. Patient voiced understanding.    Gabriela RN-BSN  [facility]  [phone]

## 2018-09-24 NOTE — TELEPHONE ENCOUNTER
Called and LM for patient to call back in regards to appointment.    Gabriela RN-BSN  [facility]  [phone]

## 2018-09-27 ENCOUNTER — OFFICE VISIT (OUTPATIENT)
Dept: DERMATOLOGY | Facility: CLINIC | Age: 63
End: 2018-09-27
Payer: COMMERCIAL

## 2018-09-27 VITALS — SYSTOLIC BLOOD PRESSURE: 110 MMHG | HEART RATE: 83 BPM | OXYGEN SATURATION: 97 % | DIASTOLIC BLOOD PRESSURE: 72 MMHG

## 2018-09-27 DIAGNOSIS — M67.449 DIGITAL MUCOUS CYST: Primary | ICD-10-CM

## 2018-09-27 PROCEDURE — 11421 EXC H-F-NK-SP B9+MARG 0.6-1: CPT | Performed by: DERMATOLOGY

## 2018-09-27 PROCEDURE — 88305 TISSUE EXAM BY PATHOLOGIST: CPT | Mod: TC | Performed by: DERMATOLOGY

## 2018-09-27 NOTE — MR AVS SNAPSHOT
After Visit Summary   9/27/2018    Rich Terry    MRN: [id number]           Patient Information     Date Of Birth          1955        Visit Information        Provider Department      9/27/2018 11:15 AM Masoud Ocampo MD [facility]        Today's Diagnoses     Digital mucous cyst    -  1       Follow-ups after your visit        Who to contact     If you have questions or need follow up information about today's clinic visit or your schedule please contact [facility] directly at [phone].  Normal or non-critical lab and imaging results will be communicated to you by MyChart, letter or phone within 4 business days after the clinic has received the results. If you do not hear from us within 7 days, please contact the clinic through MyChart or phone. If you have a critical or abnormal lab result, we will notify you by phone as soon as possible.  Submit refill requests through Sourcery or call your pharmacy and they will forward the refill request to us. Please allow 3 business days for your refill to be completed.          Additional Information About Your Visit        Care EveryWhere ID     This is your Care EveryWhere ID. This could be used by other organizations to access your [facility] medical records  [id number]        Your Vitals Were     Pulse Last Period Pulse Oximetry             83 09/11/2005 97%          Blood Pressure from Last 3 Encounters:   09/27/18 110/72   08/21/18 106/68   06/26/18 136/80    Weight from Last 3 Encounters:   08/21/18 109.3 kg (241 lb)   06/11/18 110 kg (242 lb 6.4 oz)   05/14/18 111.1 kg (245 lb)              We Performed the Following     74006 - EXC BENIGN SKIN LESION SCLP/NCK/HNDS/FEET/GEN 0.6-1.0 CM     Dermatological path order and indications        Primary Care Provider Office Phone # Fax #    Ragini Stevens [phone]       [id number]  [address]   [facility]  [id number]        Equal Access to Services     [facility]: Hadii sheba gomes oneil Darling, waaxda luqadaha, qaybta kaalmada víctorkalliemamadou, cheo kerrbalwinderdi tate. So [facility] [phone].    ATENCIÓN: Si habla español, tiene a hfofman disposición servicios gratuitos de asistencia lingüística. Inésame al [phone].    We comply with applicable federal civil rights laws and [place] laws. We do not discriminate on the basis of race, color, national origin, age, disability, sex, sexual orientation, or gender identity.            Thank you!     Thank you for choosing [facility]  for your care. Our goal is always to provide you with excellent care. Hearing back from our patients is one way we can continue to improve our services. Please take a few minutes to complete the written survey that you may receive in the mail after your visit with us. Thank you!             Your Updated Medication List - Protect others around you: Learn how to safely use, store and throw away your medicines at www.disposemymeds.org.          This list is accurate as of 9/27/18 11:48 AM.  Always use your most recent med list.                   Brand Name Dispense Instructions for use Diagnosis    predniSONE 20 MG tablet    DELTASONE    20 tablet    Take 3 tabs (60 mg) by mouth daily x 3 days, 2 tabs (40 mg) daily x 3 days, 1 tab (20 mg) daily x 3 days, then 1/2 tab (10 mg) x 3 days.    Rash and nonspecific skin eruption       triamcinolone 0.1 % ointment    KENALOG    80 g    Apply to AA on lower leg BID x 1-2 weeks then PRN    Venous stasis dermatitis of left lower extremity       valACYclovir 1000 mg tablet    VALTREX    21 tablet    Take 1 tablet (1,000 mg) by mouth 3 times daily    Rash and nonspecific skin eruption

## 2018-09-27 NOTE — PROGRESS NOTES
Rich Terry is a 63 year old year old female patient here today for evaluation and managment of tender cyst on right finger.   .  Patient states this has been present for years.  Patient reports the following symptoms:  painful.  Patient reports the following previous treatments none.  Patient reports the following modifying factors none.  Associated symptoms: none.  Patient has no other skin complaints today.  Remainder of the HPI, Meds, PMH, Allergies, FH, and SH was reviewed in chart.      Past Medical History:   Diagnosis Date     Generalized osteoarthrosis, unspecified site      Pain in right knee      Phlebitis and thrombophlebitis of superficial vessels of lower extremities     several episodes     Thrombosis of leg     superficial, not deep     Varicose veins of lower extremities with ulcer (H)        Past Surgical History:   Procedure Laterality Date     ARTHROPLASTY KNEE Right 2015    Procedure: ARTHROPLASTY KNEE;  Surgeon: Prasad Valladares MD;  Location: [facility] OR     C NONSPECIFIC PROCEDURE      4 knee surgeries (all left knee)     C NONSPECIFIC PROCEDURE      (L) planter fascia surgery     saphenous vein radiofrequency ablation[  2012     SURGICAL HISTORY OF -       rt LE varicose vein procedure     SURGICAL HISTORY OF -   05    Tibial osteotomy (lt leg)     varicose vein avulsions/stab phlebectomy[  2012        Family History   Problem Relation Age of Onset     Cancer Mother      cervical ca age 36     HEART DISEASE Mother      A-FIB     Skin Cancer Mother      on nose     HEART DISEASE Father       CHF age 51     Diabetes Maternal Grandfather      Cancer Maternal Grandmother      uterine ca dx in her 60's     Diabetes Paternal Grandfather      Hypertension Brother      Cancer Maternal Aunt      ovarian ca in her 40's     Diabetes Paternal Uncle      multiple Puncles with DM       Social History     Social History     Marital status:      Spouse name: N/A      Number of children: N/A     Years of education: N/A     Occupational History     Not on file.     Social History Main Topics     Smoking status: Former Smoker     Smokeless tobacco: Never Used      Comment: quit 1980     Alcohol use 0.0 oz/week     0 Standard drinks or equivalent per week      Comment: 4 drinks a month     Drug use: No     Sexual activity: Yes     Partners: Male     Other Topics Concern     Parent/Sibling W/ Cabg, Mi Or Angioplasty Before 65f 55m? Yes     Social History Narrative       Outpatient Encounter Prescriptions as of 9/27/2018   Medication Sig Dispense Refill     predniSONE (DELTASONE) 20 MG tablet Take 3 tabs (60 mg) by mouth daily x 3 days, 2 tabs (40 mg) daily x 3 days, 1 tab (20 mg) daily x 3 days, then 1/2 tab (10 mg) x 3 days. (Patient not taking: Reported on 9/27/2018) 20 tablet 0     triamcinolone (KENALOG) 0.1 % ointment Apply to AA on lower leg BID x 1-2 weeks then PRN (Patient not taking: Reported on 9/27/2018) 80 g 3     valACYclovir (VALTREX) 1000 mg tablet Take 1 tablet (1,000 mg) by mouth 3 times daily (Patient not taking: Reported on 9/27/2018) 21 tablet 0     No facility-administered encounter medications on file as of 9/27/2018.              Review Of Systems  Skin: As above  Eyes: negative  Ears/Nose/Throat: negative  Respiratory: No shortness of breath, dyspnea on exertion, cough, or hemoptysis  Cardiovascular: negative  Gastrointestinal: negative  Genitourinary: negative  Musculoskeletal: negative  Neurologic: negative  Psychiatric: negative  Hematologic/Lymphatic/Immunologic: negative  Endocrine: negative      O:   NAD, WDWN, Alert & Oriented, Mood & Affect wnl, Vitals stable   Here today alone   /72  Pulse 83  LMP 09/11/2005  SpO2 97%   General appearance normal   Vitals stable   Alert, oriented and in no acute distress     R 3rd dip 7mm clear papule      Eyes: Conjunctivae/lids:Normal     ENT: Lips, buccal mucosa, tongue:  normal    MSK:Normal    Cardiovascular: peripheral edema none    Pulm: Breathing Normal    Neuro/Psych: Orientation:Normal; Mood/Affect:Normal      A/P:  1. Digital mucous cyst  EXCISION OF digital mucous cyst, AND Second intent: After thorough discussion of PGACAC, consent obtained, anesthesia and prep, the margins of the lesion were identified and an elliptical incision was made encompassing the lesion with 1mm margin. The incisions were made through the skin and down to and including the superficial dermis.  The lesion was removed en bloc and submitted for perm section pathologic review. Base treated with cautyer     REPAIR SECOND INTENT: We discussed the options for wound management in full with the patient including risks/benefits/possible outcomes. Decision made to allow the wound to heal by second intention. EBL minimal; complications none; wound care routine.  The patient was discharged in good condition and will return in one month or prn for wound evaluation.       BENIGN LESIONS DISCUSSED WITH PATIENT:  I discussed the specifics of tumor, prognosis, and genetics of benign lesions.  I explained that treatment of these lesions would be purely cosmetic and not medically neccessary.  I discussed with patient different removal options including excision, cautery and /or laser.

## 2018-09-27 NOTE — LETTER
2018         RE: Rich Terry  [address]  [place] [address]        Dear Colleague,    Thank you for referring your patient, Rich Terry, to the [facility]. Please see a copy of my visit note below.    Rich Terry is a 63 year old year old female patient here today for evaluation and managment of tender cyst on right finger.   .  Patient states this has been present for years.  Patient reports the following symptoms:  painful.  Patient reports the following previous treatments none.  Patient reports the following modifying factors none.  Associated symptoms: none.  Patient has no other skin complaints today.  Remainder of the HPI, Meds, PMH, Allergies, FH, and SH was reviewed in chart.      Past Medical History:   Diagnosis Date     Generalized osteoarthrosis, unspecified site      Pain in right knee      Phlebitis and thrombophlebitis of superficial vessels of lower extremities     several episodes     Thrombosis of leg     superficial, not deep     Varicose veins of lower extremities with ulcer (H)        Past Surgical History:   Procedure Laterality Date     ARTHROPLASTY KNEE Right 2015    Procedure: ARTHROPLASTY KNEE;  Surgeon: Prasad Valladares MD;  Location: [facility] OR     C NONSPECIFIC PROCEDURE      4 knee surgeries (all left knee)     C NONSPECIFIC PROCEDURE      (L) planter fascia surgery     saphenous vein radiofrequency ablation[  2012     SURGICAL HISTORY OF -       rt LE varicose vein procedure     SURGICAL HISTORY OF -   05    Tibial osteotomy (lt leg)     varicose vein avulsions/stab phlebectomy[  2012        Family History   Problem Relation Age of Onset     Cancer Mother      cervical ca age 36     HEART DISEASE Mother      A-FIB     Skin Cancer Mother      on nose     HEART DISEASE Father       CHF age 51     Diabetes Maternal Grandfather      Cancer Maternal Grandmother      uterine ca dx in her 60's      Diabetes Paternal Grandfather      Hypertension Brother      Cancer Maternal Aunt      ovarian ca in her 40's     Diabetes Paternal Uncle      multiple Puncles with DM       Social History     Social History     Marital status:      Spouse name: N/A     Number of children: N/A     Years of education: N/A     Occupational History     Not on file.     Social History Main Topics     Smoking status: Former Smoker     Smokeless tobacco: Never Used      Comment: quit 1980     Alcohol use 0.0 oz/week     0 Standard drinks or equivalent per week      Comment: 4 drinks a month     Drug use: No     Sexual activity: Yes     Partners: Male     Other Topics Concern     Parent/Sibling W/ Cabg, Mi Or Angioplasty Before 65f 55m? Yes     Social History Narrative       Outpatient Encounter Prescriptions as of 9/27/2018   Medication Sig Dispense Refill     predniSONE (DELTASONE) 20 MG tablet Take 3 tabs (60 mg) by mouth daily x 3 days, 2 tabs (40 mg) daily x 3 days, 1 tab (20 mg) daily x 3 days, then 1/2 tab (10 mg) x 3 days. (Patient not taking: Reported on 9/27/2018) 20 tablet 0     triamcinolone (KENALOG) 0.1 % ointment Apply to AA on lower leg BID x 1-2 weeks then PRN (Patient not taking: Reported on 9/27/2018) 80 g 3     valACYclovir (VALTREX) 1000 mg tablet Take 1 tablet (1,000 mg) by mouth 3 times daily (Patient not taking: Reported on 9/27/2018) 21 tablet 0     No facility-administered encounter medications on file as of 9/27/2018.              Review Of Systems  Skin: As above  Eyes: negative  Ears/Nose/Throat: negative  Respiratory: No shortness of breath, dyspnea on exertion, cough, or hemoptysis  Cardiovascular: negative  Gastrointestinal: negative  Genitourinary: negative  Musculoskeletal: negative  Neurologic: negative  Psychiatric: negative  Hematologic/Lymphatic/Immunologic: negative  Endocrine: negative      O:   NAD, WDWN, Alert & Oriented, Mood & Affect wnl, Vitals stable   Here today alone   /72   Pulse 83  LMP 09/11/2005  SpO2 97%   General appearance normal   Vitals stable   Alert, oriented and in no acute distress     R 3rd dip 7mm clear papule      Eyes: Conjunctivae/lids:Normal     ENT: Lips, buccal mucosa, tongue: normal    MSK:Normal    Cardiovascular: peripheral edema none    Pulm: Breathing Normal    Neuro/Psych: Orientation:Normal; Mood/Affect:Normal      A/P:  1. Digital mucous cyst  EXCISION OF digital mucous cyst, AND Second intent: After thorough discussion of PGACAC, consent obtained, anesthesia and prep, the margins of the lesion were identified and an elliptical incision was made encompassing the lesion with 1mm margin. The incisions were made through the skin and down to and including the superficial dermis.  The lesion was removed en bloc and submitted for perm section pathologic review. Base treated with cautyer     REPAIR SECOND INTENT: We discussed the options for wound management in full with the patient including risks/benefits/possible outcomes. Decision made to allow the wound to heal by second intention. EBL minimal; complications none; wound care routine.  The patient was discharged in good condition and will return in one month or prn for wound evaluation.       BENIGN LESIONS DISCUSSED WITH PATIENT:  I discussed the specifics of tumor, prognosis, and genetics of benign lesions.  I explained that treatment of these lesions would be purely cosmetic and not medically neccessary.  I discussed with patient different removal options including excision, cautery and /or laser.        Again, thank you for allowing me to participate in the care of your patient.        Sincerely,        Masoud Ocampo MD

## 2018-10-01 LAB — COPATH REPORT: NORMAL

## 2018-10-02 ENCOUNTER — TELEPHONE (OUTPATIENT)
Dept: DERMATOLOGY | Facility: CLINIC | Age: 63
End: 2018-10-02

## 2018-10-02 NOTE — TELEPHONE ENCOUNTER
Patient notified of test results and providers message, patient has no further questions.    Hilda ALBARN BSN  [facility]  [phone]

## 2018-10-02 NOTE — TELEPHONE ENCOUNTER
Left message on answering machine for patient to call back.    Hilda ALBARN BSN  [facility]  [phone]  [facility]   [phone]      Notes Recorded by Masoud Ocampo MD on 10/2/2018 at 7:33 AM  FINAL DIAGNOSIS:   Skin, right third DIP:   - Features consistent with digital myxoid cyst, see description     No furhter treatment

## 2018-10-02 NOTE — TELEPHONE ENCOUNTER
Pt returned phone call, she will be leaving shortly it is ok to leave a detailed message, [phone].    Iva Allen  Patient

## 2018-10-19 NOTE — MR AVS SNAPSHOT
After Visit Summary   5/18/2017    Rich Terry    MRN: [id number]           Patient Information     Date Of Birth          1955        Visit Information        Provider Department      5/18/2017 9:20 AM Tahira Dunn DO [facility]        Today's Diagnoses     Acute pain of right shoulder    -  1    Encounter related to worker's compensation claim          Care Instructions    Thank you for allowing us to participate in your care today.  Please find below your visit diagnosis and the plan going forward.    1. Acute pain of right shoulder    2. Encounter related to worker's compensation claim      Activity modification as discussed - no work above the shoulder, no lifting away from the body, limit overall weight to < 10 pounds. Letter for work provided.  MRI of your right shoulder has been ordered. Will check on same day availability.    I will call you tomorrow on your home number with results of MRI.  Call direct clinic number [[phone]] at any time with questions or concerns.    Tahira Dunn DO [facility]  Website: [email or web address]  Twitter: [email or web address]          Follow-ups after your visit        Future tests that were ordered for you today     Open Future Orders        Priority Expected Expires Ordered    MR Shoulder Right w/o Contrast Routine  5/19/2018 5/18/2017            Who to contact     If you have questions or need follow up information about today's clinic visit or your schedule please contact [facility] directly at [phone].  Normal or non-critical lab and imaging results will be communicated to you by MyChart, letter or phone within 4 business days after the clinic has received the results. If you do not hear from us within 7 days, please contact the clinic through MyChart or phone. If you have a critical or abnormal lab result, we will notify you by phone as soon as  "possible.  Submit refill requests through [employer] or call your pharmacy and they will forward the refill request to us. Please allow 3 business days for your refill to be completed.          Additional Information About Your Visit        [facility] Information     [employer] lets you send messages to your doctor, view your test results, renew your prescriptions, schedule appointments and more. To sign up, go to [email or web address] . Click on \"Log in\" on the left side of the screen, which will take you to the Welcome page. Then click on \"Sign up Now\" on the right side of the page.     You will be asked to enter the access code listed below, as well as some personal information. Please follow the directions to create your username and password.     Your access code is: [email or web address]  Expires: 2017 12:17 PM     Your access code will  in 90 days. If you need help or a new code, please call your [place] clinic or [phone].        Care EveryWhere ID     This is your Care EveryWhere ID. This could be used by other organizations to access your [place] medical records  [id number]        Your Vitals Were     Height Last Period BMI (Body Mass Index)             5' 6\" (1.676 m) 2005 39.06 kg/m2          Blood Pressure from Last 3 Encounters:   17 144/80   17 142/80   17 132/80    Weight from Last 3 Encounters:   17 242 lb (109.8 kg)   17 242 lb 9.6 oz (110 kg)   17 239 lb 11.2 oz (108.7 kg)               Primary Care Provider Office Phone # Fax #    Ragini Stevens [phone]       [facility] [address]  [address]   [facility] [address]        Thank you!     Thank you for choosing [facility]  for your care. Our goal is always to provide you with excellent care. Hearing back from our patients is one way we can continue to improve our services. Please take a few minutes to complete the written survey that you may " receive in the mail after your visit with us. Thank you!             Your Updated Medication List - Protect others around you: Learn how to safely use, store and throw away your medicines at www.disposemymeds.org.          This list is accurate as of: 5/18/17 10:00 AM.  Always use your most recent med list.                   Brand Name Dispense Instructions for use    HYDROcodone-acetaminophen 5-325 MG per tablet    NORCO    45 tablet    Take 1-2 tablets by mouth every 4 hours as needed for moderate to severe pain maximum 2 tablet(s) per day       methocarbamol 750 MG tablet    ROBAXIN    60 tablet    Take 1 tablet (750 mg) by mouth 4 times daily as needed for muscle spasms          Left arm;

## 2019-01-21 ENCOUNTER — TELEPHONE (OUTPATIENT)
Dept: FAMILY MEDICINE | Facility: CLINIC | Age: 64
End: 2019-01-21

## 2019-01-21 NOTE — TELEPHONE ENCOUNTER
Panel Management Review      Patient has the following on her problem list: None      Composite cancer screening  Chart review shows that this patient is due/due soon for the following Fecal Colorectal (FIT)  Summary:    Patient is due/failing the following:   FIT    Action needed:   Patient needs to complete the FIT Test and drop off at clinic.       Type of outreach:    Patient's INS does NOT cover the FIT Test or the colonoscopy.      Questions for provider review:    None                                                                                                                                    Lisa Magill, CMA       Chart routed to Provider .

## 2020-07-14 NOTE — PROGRESS NOTES
"Pre-Visit Planning     Future Appointments   Date Time Provider Department Center   7/15/2020 10:00 AM Alberto Hall DO [facility]     Arrival Time for this Appointment:  9:50 AM   Appointment Notes for this encounter:   Blister rashes     Questionnaires Reviewed/Assigned  Additional questionnaires assigned      Patient preferred phone number: [phone]      Spoke to patient via phone. Patient does not have additional questions or concerns.        Visit is not preventive.    Patient is established.    Patient reminded of date and time. Barriers addressed: none  Chief complaint confirmed. Rash beginning 3 weeks ago  Clarified visit purpose: will start as a rash and transition into blisters. Primarily on left side from hairline to legs.   Checked for changes of symptoms or new symptoms: denies any changes in soap or laundry detergent     Health Maintenance Due   Topic Date Due     URINE DRUG SCREEN  1955     ANNUAL REVIEW OF HM ORDERS  1955     COLORECTAL CANCER SCREENING  02/05/1965     ZOSTER IMMUNIZATION (1 of 2) 02/05/2005     DEXA  08/21/2015     ADVANCE CARE PLANNING  08/28/2017     PHQ-2  01/01/2020     MEDICARE ANNUAL WELLNESS VISIT  02/05/2020     FALL RISK ASSESSMENT  02/05/2020     PNEUMOCOCCAL IMMUNIZATION 65+ LOW/MEDIUM RISK (1 of 2 - PCV13) 02/05/2020     MAMMO SCREENING  08/04/2020     Patient is due for:        [employer]ild  Patient is not active on Gild. Signed patient up for Gild.    Questionnaire Review   Offered information on completing questionnaires via [employer].    Call Summary  \"Thank you for your time today.  If anything comes up before your appointment, please feel free to contact us at [phone].\"  Answers for HPI/ROS submitted by the patient on 7/15/2020   If you checked off any problems, how difficult have these problems made it for you to do your work, take care of things at home, or get along with other people?: Not difficult at all  PHQ9 TOTAL SCORE: 2  ANABEL 7 TOTAL " SCORE: 6

## 2020-07-15 ENCOUNTER — OFFICE VISIT (OUTPATIENT)
Dept: FAMILY MEDICINE | Facility: CLINIC | Age: 65
End: 2020-07-15
Payer: MEDICARE

## 2020-07-15 VITALS
HEART RATE: 66 BPM | HEIGHT: 67 IN | BODY MASS INDEX: 39.71 KG/M2 | SYSTOLIC BLOOD PRESSURE: 126 MMHG | RESPIRATION RATE: 16 BRPM | WEIGHT: 253 LBS | TEMPERATURE: 98.7 F | DIASTOLIC BLOOD PRESSURE: 76 MMHG

## 2020-07-15 DIAGNOSIS — L23.7 CONTACT DERMATITIS DUE TO POISON IVY: Primary | ICD-10-CM

## 2020-07-15 PROCEDURE — 99213 OFFICE O/P EST LOW 20 MIN: CPT | Performed by: FAMILY MEDICINE

## 2020-07-15 RX ORDER — PREDNISONE 20 MG/1
TABLET ORAL
Qty: 21 TABLET | Refills: 0 | Status: SHIPPED | OUTPATIENT
Start: 2020-07-15 | End: 2020-08-02

## 2020-07-15 ASSESSMENT — PATIENT HEALTH QUESTIONNAIRE - PHQ9
SUM OF ALL RESPONSES TO PHQ QUESTIONS 1-9: 2
SUM OF ALL RESPONSES TO PHQ QUESTIONS 1-9: 2
10. IF YOU CHECKED OFF ANY PROBLEMS, HOW DIFFICULT HAVE THESE PROBLEMS MADE IT FOR YOU TO DO YOUR WORK, TAKE CARE OF THINGS AT HOME, OR GET ALONG WITH OTHER PEOPLE: NOT DIFFICULT AT ALL

## 2020-07-15 ASSESSMENT — ANXIETY QUESTIONNAIRES
3. WORRYING TOO MUCH ABOUT DIFFERENT THINGS: SEVERAL DAYS
2. NOT BEING ABLE TO STOP OR CONTROL WORRYING: SEVERAL DAYS
GAD7 TOTAL SCORE: 6
1. FEELING NERVOUS, ANXIOUS, OR ON EDGE: SEVERAL DAYS
4. TROUBLE RELAXING: SEVERAL DAYS
7. FEELING AFRAID AS IF SOMETHING AWFUL MIGHT HAPPEN: NOT AT ALL
6. BECOMING EASILY ANNOYED OR IRRITABLE: SEVERAL DAYS
7. FEELING AFRAID AS IF SOMETHING AWFUL MIGHT HAPPEN: NOT AT ALL
GAD7 TOTAL SCORE: 6
5. BEING SO RESTLESS THAT IT IS HARD TO SIT STILL: SEVERAL DAYS
GAD7 TOTAL SCORE: 6

## 2020-07-15 ASSESSMENT — MIFFLIN-ST. JEOR: SCORE: 1717.29

## 2020-07-15 NOTE — PROGRESS NOTES
"Subjective     Rich Terry is a 65 year old female who presents to clinic today for the following health issues:    HPI        Rash  Onset: X 3 weeks    Description:   Location: both arms, right inner thigh, face and scalp  Character: blotchy, raised, flakey, painful, burning, draining, red  Itching (Pruritis): YES    Progression of Symptoms:  same    Accompanying Signs & Symptoms:  Fever: no   Body aches or joint pain: no   Sore throat symptoms: no   Recent cold symptoms: no     History:   Previous similar rash: YES- had shingles    Precipitating factors:   Exposure to similar rash: YES- maybe  New exposures: None   Recent travel: no     Alleviating factors:  See below    Physician HPI: Patient is seen for chief concern of an itchy rash . Patient started to have an itchy rash below her left bra about 3 weeks ago. A similar rash was noted over the next several days over her upper extremities on forearms and upper arms, right proximal/medial thigh, and both ankles. The rash has resolved over ankles, but seems to be increasing elsewhere. She is around a lot of plants in her garden, and cabin property. The rash tends to start with blisters, which are very itchy that rupture when she itches.  No new problems with breathing such as shortness of breath, and no recent fevers or chills.           Review of Systems   See history of present illness.  She cannot think of any new environmental exposures which may be contributing to her rash, such as new clothing or soaps.      Objective    /76 (BP Location: Left arm, Patient Position: Sitting, Cuff Size: Adult Large)   Pulse 66   Temp 98.7  F (37.1  C) (Oral)   Resp 16   Ht 1.689 m (5' 6.5\")   Wt 114.8 kg (253 lb)   LMP 09/11/2005   BMI 40.22 kg/m    Body mass index is 40.22 kg/m .  Physical Exam   Vital signs reviewed.  Patient is in no acute appearing distress.  Breathing appears nonlabored.  Patient is alert and oriented ×3.  Skin exam: Patient has scattered " areas of slightly raised erythema in areas described in the history of present illness.  Within these areas of erythema, there are some vesicular formations which have an jay-colored appearance.  The largest area of erythema is approximately 5 x 2 cm.  Assessment & Plan     1. Contact dermatitis due to poison ivy  Patient given self-care instructions and after visit summary.  - predniSONE (DELTASONE) 20 MG tablet; Take 2 tablets (40 mg) by mouth daily for 6 days, THEN 1 tablet (20 mg) daily for 6 days, THEN 0.5 tablets (10 mg) daily for 6 days.  Dispense: 21 tablet; Refill: 0      Patient Instructions     See info on hand out. Poison ivy is what I most strongly suspect is your cause of rash.     Patient Education     Managing a Poison Ivy, Poison Oak, or Poison Sumac Reaction  If you come in contact with urushiol    If you think you may have come in contact with the sap oil (called urushiol) contained in poison ivy, poison oak, or poison sumac plants, wash the affected part of your skin. Do this within 15 minutes after contact. Use water or preferably, soap and water.  Undress, and wash your clothes and gear as soon as you can. Be sure to wash any pet that was with you. Taking these steps can help prevent spreading sap oil to someone else. If you have a rash, but are not sure if it is from one of these plants, see your healthcare provider.  To soothe the itching  Your skin may react to poison oak, poison ivy, and poison sumac within hours to a few days after contact. Once you have come into contact with these plants, you can t stop the reaction. But you can take these steps to soothe the itching:    Don t scratch or scrub your rash. Not even if the itching is severe. Scratching can lead to infection.    Bathe in lukewarm (not hot) water. Or take short cool showers to relieve the itching. For a more soothing bath, add oatmeal to the water.    Use antihistamines that are taken by mouth. These include  diphenhydramine. You can buy these at the pharmacy. Talk to your healthcare provider or pharmacist for more information on oral antihistamines.    Use over-the-counter treatments on your skin. These include cortisone and calamine lotion.  How your skin may react  A mild rash may become red, swollen, and itchy. The rash may form a line on your skin where you brushed against the plant. If you have a severe rash, your itching may worsen. And your rash may blister and ooze. If this happens, seek medical care. The fluid from your blisters will not make your rash spread. With or without medical care, your rash may last up to 3 weeks. In the future, try to avoid coming in contact with these plants.  When to call your healthcare provider  Call your healthcare provider if:    Your rash is severe    The rash spreads beyond the exposed part of your body or affects your face.    The rash does not clear up within a few weeks  You may be given medicine to take by mouth or apply directly on the skin.     Call 911  Call 911 if you have any of the following:    Trouble breathing or swallowing    Any significant swelling   Date Last Reviewed: 3/1/2017    2023-3762 [employer]. [address] [place] [address]. All rights reserved. This information is not intended as a substitute for professional medical care. Always follow your healthcare professional's instructions.               Return in about 1 week (around 7/22/2020), or if symptoms worsen or fail to improve.    Alberto Hall, DO  [facility]    Answers for HPI/ROS submitted by the patient on 7/15/2020   If you checked off any problems, how difficult have these problems made it for you to do your work, take care of things at home, or get along with other people?: Not difficult at all  PHQ9 TOTAL SCORE: 2  ANABEL 7 TOTAL SCORE: 6

## 2020-07-15 NOTE — PATIENT INSTRUCTIONS
See info on hand out. Poison ivy is what I most strongly suspect is your cause of rash.     Patient Education     Managing a Poison Ivy, Poison Oak, or Poison Sumac Reaction  If you come in contact with urushiol    If you think you may have come in contact with the sap oil (called urushiol) contained in poison ivy, poison oak, or poison sumac plants, wash the affected part of your skin. Do this within 15 minutes after contact. Use water or preferably, soap and water.  Undress, and wash your clothes and gear as soon as you can. Be sure to wash any pet that was with you. Taking these steps can help prevent spreading sap oil to someone else. If you have a rash, but are not sure if it is from one of these plants, see your healthcare provider.  To soothe the itching  Your skin may react to poison oak, poison ivy, and poison sumac within hours to a few days after contact. Once you have come into contact with these plants, you can t stop the reaction. But you can take these steps to soothe the itching:    Don t scratch or scrub your rash. Not even if the itching is severe. Scratching can lead to infection.    Bathe in lukewarm (not hot) water. Or take short cool showers to relieve the itching. For a more soothing bath, add oatmeal to the water.    Use antihistamines that are taken by mouth. These include diphenhydramine. You can buy these at the pharmacy. Talk to your healthcare provider or pharmacist for more information on oral antihistamines.    Use over-the-counter treatments on your skin. These include cortisone and calamine lotion.  How your skin may react  A mild rash may become red, swollen, and itchy. The rash may form a line on your skin where you brushed against the plant. If you have a severe rash, your itching may worsen. And your rash may blister and ooze. If this happens, seek medical care. The fluid from your blisters will not make your rash spread. With or without medical care, your rash may last up to 3  weeks. In the future, try to avoid coming in contact with these plants.  When to call your healthcare provider  Call your healthcare provider if:    Your rash is severe    The rash spreads beyond the exposed part of your body or affects your face.    The rash does not clear up within a few weeks  You may be given medicine to take by mouth or apply directly on the skin.     Call 911  Call 911 if you have any of the following:    Trouble breathing or swallowing    Any significant swelling   Date Last Reviewed: 3/1/2017    2168-9727 [employer]. [address] [place], [place] [address]. All rights reserved. This information is not intended as a substitute for professional medical care. Always follow your healthcare professional's instructions.

## 2020-07-16 ASSESSMENT — ANXIETY QUESTIONNAIRES: GAD7 TOTAL SCORE: 6

## 2021-01-15 ENCOUNTER — HEALTH MAINTENANCE LETTER (OUTPATIENT)
Age: 66
End: 2021-01-15

## 2021-05-11 ENCOUNTER — TELEPHONE (OUTPATIENT)
Dept: OTHER | Facility: CLINIC | Age: 66
End: 2021-05-11

## 2021-05-11 ENCOUNTER — OFFICE VISIT (OUTPATIENT)
Dept: FAMILY MEDICINE | Facility: CLINIC | Age: 66
End: 2021-05-11
Payer: MEDICARE

## 2021-05-11 ENCOUNTER — HOSPITAL ENCOUNTER (OUTPATIENT)
Dept: ULTRASOUND IMAGING | Facility: CLINIC | Age: 66
Discharge: HOME | End: 2021-05-11
Attending: FAMILY MEDICINE | Admitting: FAMILY MEDICINE
Payer: MEDICARE

## 2021-05-11 ENCOUNTER — OFFICE VISIT (OUTPATIENT)
Dept: PEDIATRICS | Facility: CLINIC | Age: 66
End: 2021-05-11
Payer: MEDICARE

## 2021-05-11 VITALS
BODY MASS INDEX: 41.18 KG/M2 | HEART RATE: 75 BPM | RESPIRATION RATE: 18 BRPM | SYSTOLIC BLOOD PRESSURE: 140 MMHG | OXYGEN SATURATION: 98 % | TEMPERATURE: 98 F | WEIGHT: 259 LBS | DIASTOLIC BLOOD PRESSURE: 85 MMHG

## 2021-05-11 VITALS
HEART RATE: 95 BPM | SYSTOLIC BLOOD PRESSURE: 110 MMHG | RESPIRATION RATE: 16 BRPM | WEIGHT: 259.2 LBS | OXYGEN SATURATION: 97 % | HEIGHT: 67 IN | DIASTOLIC BLOOD PRESSURE: 74 MMHG | BODY MASS INDEX: 40.68 KG/M2 | TEMPERATURE: 98.4 F

## 2021-05-11 DIAGNOSIS — I87.2 VENOUS STASIS DERMATITIS OF LEFT LOWER EXTREMITY: ICD-10-CM

## 2021-05-11 DIAGNOSIS — M67.40 MUCOID CYST OF JOINT: ICD-10-CM

## 2021-05-11 DIAGNOSIS — Z12.11 SCREEN FOR COLON CANCER: ICD-10-CM

## 2021-05-11 DIAGNOSIS — E66.01 MORBID OBESITY (H): ICD-10-CM

## 2021-05-11 DIAGNOSIS — M79.89 CALF SWELLING: ICD-10-CM

## 2021-05-11 DIAGNOSIS — L30.9 NIPPLE DERMATITIS: ICD-10-CM

## 2021-05-11 DIAGNOSIS — Z13.820 SCREENING FOR OSTEOPOROSIS: ICD-10-CM

## 2021-05-11 DIAGNOSIS — N64.4 BREAST PAIN: ICD-10-CM

## 2021-05-11 DIAGNOSIS — Z78.0 MENOPAUSE: ICD-10-CM

## 2021-05-11 DIAGNOSIS — M79.89 CALF SWELLING: Primary | ICD-10-CM

## 2021-05-11 DIAGNOSIS — I87.2 VENOUS STASIS DERMATITIS OF LEFT LOWER EXTREMITY: Primary | ICD-10-CM

## 2021-05-11 LAB
BASOPHILS # BLD AUTO: 0.1 10E9/L (ref 0–0.2)
BASOPHILS NFR BLD AUTO: 0.7 %
CRP SERPL-MCNC: 7.8 MG/L (ref 0–8)
DIFFERENTIAL METHOD BLD: NORMAL
EOSINOPHIL # BLD AUTO: 0.2 10E9/L (ref 0–0.7)
EOSINOPHIL NFR BLD AUTO: 1.9 %
ERYTHROCYTE [DISTWIDTH] IN BLOOD BY AUTOMATED COUNT: 13.4 % (ref 10–15)
HCT VFR BLD AUTO: 43.3 % (ref 35–47)
HGB BLD-MCNC: 13.9 G/DL (ref 11.7–15.7)
IMM GRANULOCYTES # BLD: 0 10E9/L (ref 0–0.4)
IMM GRANULOCYTES NFR BLD: 0.2 %
LYMPHOCYTES # BLD AUTO: 2.8 10E9/L (ref 0.8–5.3)
LYMPHOCYTES NFR BLD AUTO: 33.9 %
MCH RBC QN AUTO: 30.1 PG (ref 26.5–33)
MCHC RBC AUTO-ENTMCNC: 32.1 G/DL (ref 31.5–36.5)
MCV RBC AUTO: 94 FL (ref 78–100)
MONOCYTES # BLD AUTO: 0.5 10E9/L (ref 0–1.3)
MONOCYTES NFR BLD AUTO: 5.9 %
NEUTROPHILS # BLD AUTO: 4.8 10E9/L (ref 1.6–8.3)
NEUTROPHILS NFR BLD AUTO: 57.4 %
NRBC # BLD AUTO: 0 10*3/UL
NRBC BLD AUTO-RTO: 0 /100
PLATELET # BLD AUTO: 338 10E9/L (ref 150–450)
RBC # BLD AUTO: 4.62 10E12/L (ref 3.8–5.2)
WBC # BLD AUTO: 8.3 10E9/L (ref 4–11)

## 2021-05-11 PROCEDURE — 93971 EXTREMITY STUDY: CPT | Mod: LT

## 2021-05-11 PROCEDURE — 99215 OFFICE O/P EST HI 40 MIN: CPT | Performed by: FAMILY MEDICINE

## 2021-05-11 PROCEDURE — 85025 COMPLETE CBC W/AUTO DIFF WBC: CPT | Performed by: FAMILY MEDICINE

## 2021-05-11 PROCEDURE — 86140 C-REACTIVE PROTEIN: CPT | Performed by: FAMILY MEDICINE

## 2021-05-11 PROCEDURE — 36415 COLL VENOUS BLD VENIPUNCTURE: CPT | Performed by: FAMILY MEDICINE

## 2021-05-11 PROCEDURE — 99207 REFERRAL TO ACUTE AND DIAGNOSTIC SERVICES: CPT | Performed by: FAMILY MEDICINE

## 2021-05-11 ASSESSMENT — MIFFLIN-ST. JEOR: SCORE: 1740.41

## 2021-05-11 ASSESSMENT — PAIN SCALES - GENERAL: PAINLEVEL: SEVERE PAIN (6)

## 2021-05-11 NOTE — PROGRESS NOTES
Assessment & Plan     Calf swelling  - Referral to Acute and Diagnostic Services (Day of diagnostic / First order acute)  - US Lower Extremity Venous Duplex Left  - VASCULAR SURGERY REFERRAL    Venous stasis dermatitis of left lower extremity  - CBC with platelets and differential  - CRP, inflammation  - VASCULAR SURGERY REFERRAL    U/S w/out evidence of DVT  Exam/labs/history not suggestive of infection. Presumed stasis dermatitis. Patient will need assistance in finding a method to help with her lower extremity swelling L side as her usual compression stockings have been difficult with her accumulated swelling. Referral placed    F/u with PCP in 1-2 weeks.     Tomer Fernandez MD   [facility]      Subjective       Rich is a 66 year old female who presents to clinic today for the following health issues:  Chief Complaint   Patient presents with     Musculoskeletal Problem     Chronic left leg pain.  Swelling chronic but worsening X 2 months and increased pain     HPI    Patient is referred today to the [facility] from her primary care clinic office in [place].     Musculoskeletal problem/pain  Onset/Duration: Chronic, worsening X 2 months  Description  Location: leg - left leg and ankle  Joint Swelling: YES  Redness: YES  Pain: YES  Warmth: no  Intensity:  mild, severe  Progression of Symptoms:  worsening  Accompanying signs and symptoms:   Fevers: no  Numbness/tingling/weakness: YES- Tingling occasionally   History  Trauma to the area: no,  But notes history of multiple surgeries on leg  Recent illness:  no  Previous similar problem: YES- radio frequency oblations   Previous evaluation:  Seen my PCP today   Precipitating or alleviating factors:  Aggravating factors include: standing, walking, climbing stairs, lifting and overuse  Therapies tried and outcome: Compression stalking, wrapping in ace bandages, elevation.  These have helped minimally       During the course of this pandemic she has  developed a red rash on the inner lower side of her left leg. Occasionally there is a bit of drainage if the surface of the skin is accidentally scratched. She applies neosporin to the area. There has been  No drainage in the last day or so.     Hx of knee replacement surgery 15 years ago. Had radiofrequency ablation of her veins about 10-12 years now.     Has noticed over last couple months there has been more difficulty with getting compression stockings on her lower extremity    She has seen vascular in the past has been approx 10-12 years.       Patient Active Problem List    Diagnosis Date Noted     AK (actinic keratosis) 06/11/2018     Priority: Medium     Knee pain 11/10/2015     Priority: Medium     Aftercare following right knee joint replacement surgery 11/10/2015     Priority: Medium     S/P total knee replacement 11/02/2015     Priority: Medium     Morbid obesity (H) 10/26/2015     Priority: Medium     Venous stasis dermatitis of right lower extremity 08/04/2015     Priority: Medium     Post-menopausal 08/04/2015     Priority: Medium     Elevated glucose 06/02/2015     Priority: Medium     Dysmetabolic syndrome X 06/02/2015     Priority: Medium     DDD (degenerative disc disease), lumbar 04/19/2013     Priority: Medium     Exposure to toxic chemical 03/15/2013     Priority: Medium     Do you wish to do the replacement in the background? yes         Advanced directives, counseling/discussion 08/28/2012     Priority: Medium     Adjustment disorder with mixed anxiety and depressed mood 08/07/2012     Priority: Medium     Vitamin D deficiency 08/03/2012     Priority: Medium     (Problem list name updated by automated process. Provider to review and confirm.)       Elevated BP 06/05/2012     Priority: Medium     CARDIOVASCULAR SCREENING; LDL GOAL LESS THAN 160 02/15/2011     Priority: Medium              Generalized osteoarthrosis, unspecified site 09/18/2003     Priority: Medium       No current outpatient  medications on file.     No current facility-administered medications for this visit.          Objective    BP (!) 140/85 (BP Location: Right arm, Patient Position: Chair, Cuff Size: Adult Large)   Pulse 75   Temp 98  F (36.7  C) (Oral)   Resp 18   Wt 117.5 kg (259 lb)   LMP 09/11/2005   SpO2 98%   BMI 41.18 kg/m    Physical Exam     Measured 10 cm below tibial plateau: R leg and L leg circumference is equal at 49-50cm  L calf: noticeable swelling compared to R , without tenderness    L Lower leg medial: ~ 1.6c8tyce area of reddened skin that is non weeping. Non fluctuant.             Results for orders placed or performed in visit on 05/11/21   CBC with platelets and differential     Status: None   Result Value Ref Range    WBC 8.3 4.0 - 11.0 10e9/L    RBC Count 4.62 3.8 - 5.2 10e12/L    Hemoglobin 13.9 11.7 - 15.7 g/dL    Hematocrit 43.3 35.0 - 47.0 %    MCV 94 78 - 100 fl    MCH 30.1 26.5 - 33.0 pg    MCHC 32.1 31.5 - 36.5 g/dL    RDW 13.4 10.0 - 15.0 %    Platelet Count 338 150 - 450 10e9/L    Diff Method Automated Method     % Neutrophils 57.4 %    % Lymphocytes 33.9 %    % Monocytes 5.9 %    % Eosinophils 1.9 %    % Basophils 0.7 %    % Immature Granulocytes 0.2 %    Nucleated RBCs 0 0 /100    Absolute Neutrophil 4.8 1.6 - 8.3 10e9/L    Absolute Lymphocytes 2.8 0.8 - 5.3 10e9/L    Absolute Monocytes 0.5 0.0 - 1.3 10e9/L    Absolute Eosinophils 0.2 0.0 - 0.7 10e9/L    Absolute Basophils 0.1 0.0 - 0.2 10e9/L    Abs Immature Granulocytes 0.0 0 - 0.4 10e9/L    Absolute Nucleated RBC 0.0    CRP, inflammation     Status: None   Result Value Ref Range    CRP Inflammation 7.8 0.0 - 8.0 mg/L             The use of Dragon/[employer] services may have been used to construct the content in this note; any grammatical or spelling errors are non-intentional. Please contact the author of this note directly if you are in need of any clarification.

## 2021-05-11 NOTE — PROGRESS NOTES
{PROVIDER CHARTING PREFERENCE:064503}    Subjective   Rich is a 66 year old who presents for the following health issues {ACCOMPANIED BY STATEMENT (Optional):856865}    HPI     Musculoskeletal problem/pain  Onset/Duration: Chronic, worsening X 2 months  Description  Location: leg - left leg and ankle  Joint Swelling: YES  Redness: YES  Pain: YES  Warmth: no  Intensity:  mild, severe  Progression of Symptoms:  worsening  Accompanying signs and symptoms:   Fevers: no  Numbness/tingling/weakness: YES- Tingling occasionally   History  Trauma to the area: no,  But notes history of multiple surgeries on leg  Recent illness:  no  Previous similar problem: YES- radio frequency oblations   Previous evaluation:  Seen my PCP today   Precipitating or alleviating factors:  Aggravating factors include: standing, walking, climbing stairs, lifting and overuse  Therapies tried and outcome: Compression stalking, wrapping in ace bandages, elevation.  These have helped minimally     {additonal problems for provider to add (Optional):736862}    Review of Systems   {ROS COMP (Optional):740993}      Objective    LMP 09/11/2005   There is no height or weight on file to calculate BMI.  Physical Exam   {Exam List (Optional):434299}    {Diagnostic Test Results (Optional):771157}    {AMBULATORY ATTESTATION (Optional):706694}

## 2021-05-11 NOTE — TELEPHONE ENCOUNTER
LM for patient to call us back to schedule 30 minute in clinic follow up exam with Dr Perkins to discuss treatment options.  No labs/images.

## 2021-05-11 NOTE — TELEPHONE ENCOUNTER
Former patient of Dr. Perkins (4/15/2016).  During previous consultation with Dr. Perkins, patient was instructed to call for venous vascular surgical referral if she could not tolerate compression stockings.    Referred to [facility] by Tomer Chacko MD for calf swelling, venous stasis dermatitis of LLE.    Please schedule for follow up exam (in clinic) with Dr. Perkins to discuss treatment options.      CHRISTAL KarimiN, RN  [facility] [place]

## 2021-05-11 NOTE — PROGRESS NOTES
"    Assessment & Plan     Calf swelling  Will refer to rule out chronic DVT  - Referral to Acute and Diagnostic Services (Day of diagnostic / First order acute); Future    Venous stasis dermatitis of left lower extremity  Recommend support stockings, elevation, cortisone cream, avoid scratching, recheck in a few weeks    Nipple dermatitis  No rash noted, will order diagn mammo and US    Mucoid cyst of joint  Pt having pain and several cysts, will refer  - Orthopedic & Spine  Referral; Future    Breast pain    - MA Diagnostic Digital Bilateral; Future  - US Breast Left Complete 4 Quadrants; Future    Morbid obesity (H)  Weight has gone up due to covid, will discuss more at wellness visit    Menopause  No new concerns, other then hx of fx and needing dexa scan  - DEXA HIP/PELVIS/SPINE - Future; Future    Screening for osteoporosis  See above    Screen for colon cancer  Pt wants to hold off over the summer but will consider in the fall, colonoscopy is her first choice  - GASTROENTEROLOGY ADULT REF PROCEDURE ONLY; Future    Ordering of each unique test  45 minutes spent on the date of the encounter doing chart review, history and exam, documentation and further activities per the note       BMI:   Estimated body mass index is 41.21 kg/m  as calculated from the following:    Height as of this encounter: 1.689 m (5' 6.5\").    Weight as of this encounter: 117.6 kg (259 lb 3.2 oz).   Weight management plan: Discussed healthy diet and exercise guidelines    Work on weight loss  Regular exercise    Return in about 1 month (around 6/11/2021) for Preventive Visit.    Ragini Stevens MD  [facility] GRACIA Villanueva is a 66 year old who presents for the following health issues     HPI     Breast Concern  Onset/Duration: few months  Description:   Location: left nipple  Pain or tenderness: YES  Redness:  no   Itching: YES, a bit sore, no rash, no lump, not inverted, feels itchy, in the " shower maybe feels denser  Intensity: moderate  Progression of Symptoms: same  Accompanying Signs & Symptoms:  Any lumps in axillary region: no  Movable: not applicable  Nipple discharge: None  Changes in the skin or nipple: None  On Hormone therapy:  no   Does it change with menstrual cycle:  no   Previous history of similar problem: None  First degree relative with breast cancer: a negative family history for breast cancer.  Precipitating factors:   Alleviating factors:            Improved by: None  Therapies tried and outcome: None  Patient's last menstrual period was 09/11/2005.    Concern - leg swelling  Onset: years  Description: increasing swelling over the last month, on the left calf only, also red rash that does not heal up, the size of her hand, medial side of the lower left leg, just above the ankle. The calf hurts and swells. Even jeans will hurt.  Intensity: moderate, severe  Progression of Symptoms:  worsening  Accompanying Signs & Symptoms: pain, red pain by the left ankle  Previous history of similar problem: yes  Precipitating factors:        Worsened by:   Alleviating factors:        Improved by: None  Therapies tried and outcome: compression stockings, elevation     Patient would like to discuss cysts on bilateral fingers    covid vaccine  Complete    Colon cancer screening, no family she is holding off on screening for this summer, maybe in the fall.    Fell a few years ago, elbow hand and wrist fracture, cracked her skull, memorial week-end 2019, was watching the dogs, fell in the garage, stiches in eyebrow, and had casts,     Weight has gone up since covid, up 5# since July and gained nearly 20 since retiring.        Review of Systems   Constitutional, HEENT, cardiovascular, pulmonary, gi and gu systems are negative, except as otherwise noted.      Objective    /74 (BP Location: Right arm, Patient Position: Chair, Cuff Size: Adult Large)   Pulse 95   Temp 98.4  F (36.9  C) (Oral)    "Resp 16   Ht 1.689 m (5' 6.5\")   Wt 117.6 kg (259 lb 3.2 oz)   LMP 09/11/2005   SpO2 97%   BMI 41.21 kg/m    Body mass index is 41.21 kg/m .  Physical Exam   GENERAL: healthy, alert and no distress  BREAST: normal without masses, tenderness or nipple discharge and no palpable axillary masses or adenopathy, no rash, no nipple changes  MS: tenderness to palpation back of calf and LLE exam shows erythema medial side, min induration, and no nodules  SKIN: red rash noted with thinning skin and excoriations on lower left inner leg  NEURO: Normal strength and tone, mentation intact and speech normal  PSYCH: mentation appears normal, affect normal/bright                "

## 2021-05-12 ENCOUNTER — TELEPHONE (OUTPATIENT)
Dept: OTHER | Facility: CLINIC | Age: 66
End: 2021-05-12

## 2021-05-12 ENCOUNTER — OFFICE VISIT (OUTPATIENT)
Dept: OTHER | Facility: CLINIC | Age: 66
End: 2021-05-12
Attending: FAMILY MEDICINE
Payer: MEDICARE

## 2021-05-12 VITALS
DIASTOLIC BLOOD PRESSURE: 78 MMHG | HEIGHT: 66 IN | WEIGHT: 260 LBS | OXYGEN SATURATION: 98 % | HEART RATE: 77 BPM | SYSTOLIC BLOOD PRESSURE: 124 MMHG | BODY MASS INDEX: 41.78 KG/M2 | RESPIRATION RATE: 16 BRPM

## 2021-05-12 DIAGNOSIS — I87.1 COMPRESSION OF VEIN: ICD-10-CM

## 2021-05-12 DIAGNOSIS — I87.2 VENOUS (PERIPHERAL) INSUFFICIENCY: Primary | ICD-10-CM

## 2021-05-12 PROCEDURE — G0463 HOSPITAL OUTPT CLINIC VISIT: HCPCS

## 2021-05-12 PROCEDURE — 99204 OFFICE O/P NEW MOD 45 MIN: CPT | Performed by: INTERNAL MEDICINE

## 2021-05-12 ASSESSMENT — MIFFLIN-ST. JEOR: SCORE: 1736.1

## 2021-05-12 NOTE — PROGRESS NOTES
"Rich Terry is a 66 year old female who presents for:  Chief Complaint   Patient presents with     RECHECK     Referred by Tomer Chacko MD for calf swelling, venous stasis dermatitis of LLE. Former patient of Dr. Perkins (4/15/2016).LLE Venous Dup done 05/11/21 *LMB 05/11/21 *30 min per EB        Vitals:    Vitals:    05/12/21 1134 05/12/21 1135   BP: 133/83 124/78   BP Location: Right arm Left arm   Patient Position: Chair Chair   Cuff Size: Adult Large Adult Large   Pulse: 77    Resp: 16    SpO2: 98%    Weight: 260 lb (117.9 kg)    Height: 5' 6\" (1.676 m)        BMI:  Estimated body mass index is 41.97 kg/m  as calculated from the following:    Height as of this encounter: 5' 6\" (1.676 m).    Weight as of this encounter: 260 lb (117.9 kg).    Pain Score:  Data Unavailable        Jo Ann Lema CMA    "

## 2021-05-12 NOTE — TELEPHONE ENCOUNTER
Follow up to 5/12/21      BLE Venous competency US    CTV abdomen/Pelvis with contrast    In clinic visit 1-2 weeks later.    Zeny Merchant RN BSN  [facility]  [phone]

## 2021-05-12 NOTE — PROGRESS NOTES
"INITIAL VASCULAR MEDICINE EVALUATION  REFERRING MD: Tomer Fernandez MD    REASON FOR VASCULAR MEDICINE EVALUATION: venous ulceration      CC: venous ulceration    HPI: Ms. Terry is a morbidly obese 66-year-old white female with bilateral osteoarthritis who has had bilateral total knee arthroplasties.  Her first knee arthroplasty per her account was in 2009.  A second knee arthroplasty was performed  on 11/2/2015.  Previous to any surgical work on either of her knees, the patient has had a longstanding history of recurrent superficial thrombophlebitis without a history of deep venous thrombosis.  The patient has undergone multiple stab avulsions and radiofrequency ablations.  Her first procedure was in 2004; her last procedure was in 07/2012.  All of these procedures were performed outside the [facility].  The patient also had a tibial osteotomy of her left leg on 01/19/2005 and has had multiple knee arthroscopies.        She was referred to us because of complaints of persistent leg swelling, venous ulceration and edema.  She states that her legs hurt all the time and that the swelling is so prominent that it makes it difficult for her to keep her pants and shoes on.  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      She has since retired.     She denies classical arterial symptoms of claudication or critical limb ischemia with nocturnal rest pain.  Rather, she states that her legs \"hurt all the time\" and that ambulation in no way, shape or form worsens the symptoms.  She states that her leg pain is not better or worse with keeping the legs elevated.  She has had Jobst compression stockings which she no longer wears because they are getting worn out and she has difficulty getting them on.       An ultrasound of her bilateral lower " extremities on 05/11/2021 revealed the absence of DVT. This was not a venous competency study. She has developed superficial ulcerations of the left gaiter area. Her LLE is asymmetrically enlarged. She can not wear compression hosiery due to pain of putting them on.     Review Of Systems  Skin: negative  Eyes: negative  Ears/Nose/Throat: negative  Respiratory: No shortness of breath, dyspnea on exertion, cough, or hemoptysis  Cardiovascular: negative  Gastrointestinal: negative  Genitourinary: negative  Musculoskeletal: as above  Neurologic: negative  Psychiatric: negative  Hematologic/Lymphatic/Immunologic: negative  Endocrine: negative      PAST MEDICAL HISTORY:                  Past Medical History:   Diagnosis Date     Generalized osteoarthrosis, unspecified site      Pain in right knee      Phlebitis and thrombophlebitis of superficial vessels of lower extremities     several episodes     Thrombosis of leg     superficial, not deep     Varicose veins of lower extremities with ulcer (H)        PAST SURGICAL HISTORY:                  Past Surgical History:   Procedure Laterality Date     ARTHROPLASTY KNEE Right 11/2/2015    Procedure: ARTHROPLASTY KNEE;  Surgeon: Prasad Valladares MD;  Location: [facility] OR     saphenous vein radiofrequency ablation[  7/6/2012     SURGICAL HISTORY OF -   4/04    rt LE varicose vein procedure     SURGICAL HISTORY OF -   1-19-05    Tibial osteotomy (lt leg)     varicose vein avulsions/stab phlebectomy[  7/6/2012     Z NONSPECIFIC PROCEDURE      4 knee surgeries (all left knee)     [facility] NONSPECIFIC PROCEDURE      (L) planter fascia surgery       CURRENT MEDICATIONS:                  No current outpatient medications on file.       ALLERGIES:                  Allergies   Allergen Reactions     Levaquin      Shrimp      throat swells     Sulfa Drugs Rash     rash       SOCIAL HISTORY:                  Social History     Socioeconomic History     Marital status:      Spouse  name: Not on file     Number of children: Not on file     Years of education: Not on file     Highest education level: Not on file   Occupational History     Not on file   Social Needs     Financial resource strain: Not on file     Food insecurity     Worry: Not on file     Inability: Not on file     Transportation needs     Medical: Not on file     Non-medical: Not on file   Tobacco Use     Smoking status: Former Smoker     Smokeless tobacco: Never Used     Tobacco comment: quit    Substance and Sexual Activity     Alcohol use: Yes     Alcohol/week: 0.0 standard drinks     Comment: 4 drinks a month     Drug use: No     Sexual activity: Yes     Partners: Male   Lifestyle     Physical activity     Days per week: Not on file     Minutes per session: Not on file     Stress: Not on file   Relationships     Social connections     Talks on phone: Not on file     Gets together: Not on file     Attends Sikh service: Not on file     Active member of club or organization: Not on file     Attends meetings of clubs or organizations: Not on file     Relationship status: Not on file     Intimate partner violence     Fear of current or ex partner: Not on file     Emotionally abused: Not on file     Physically abused: Not on file     Forced sexual activity: Not on file   Other Topics Concern     Parent/sibling w/ CABG, MI or angioplasty before 65F 55M? Yes   Social History Narrative     Not on file       FAMILY HISTORY:                   Family History   Problem Relation Age of Onset     Cancer Mother         cervical ca age 36     Heart Disease Mother         A-FIB     Skin Cancer Mother         on nose     Heart Disease Father          CHF age 51     Diabetes Maternal Grandfather      Cancer Maternal Grandmother         uterine ca dx in her 60's     Diabetes Paternal Grandfather      Hypertension Brother      Cancer Maternal Aunt         ovarian ca in her 40's     Diabetes Paternal Uncle         multiple Puncles with  DM         Physical exam Reveals:    O/P: WNL  HEENT: WNL  NECK: No JVD, thyromegaly, or lymphadenopathy  HEART: RRR, no murmurs, gallops, or rubs  LUNGS: CTA bilaterally without rales, wheezes, or rhonchi  GI: NABS, nondistended, nontender, soft  EXT:without cyanosis, clubbing; CEAP C6 varicosities with lipodermatosclerosis and venous ulceration of the left gaiter area. Left calf circumference is 51 cm, right calf circumference is 42.5 cm  NEURO: nonfocal  : no flank tenderness    A/P:    (I87.2) Venous (peripheral) insufficiency  (primary encounter diagnosis)  Comment: check the below. Rule out May-Thurner anatomy. Consider stenting even in absence of DVT to afford more efficient LLE drainage. Refer to venous vascular surgery to consider EVLA of incompetent perforators if found on competency study.   Plan: CTV Abdomen Pelvis w Contrast, US Venous         Competency Bilateral            (I87.1) Compression of vein   Comment: as above  Plan: US Venous Competency Bilateral             Greater than fortyfive minutes total spent on the pt's visit were spent reviewing past records, imaging, interviewing patient, and formulating above plan. Extended time secondary to the fact that patient has not been seen by myself in over five years.

## 2021-05-14 ENCOUNTER — HOSPITAL ENCOUNTER (OUTPATIENT)
Dept: MAMMOGRAPHY | Facility: CLINIC | Age: 66
End: 2021-05-14
Attending: FAMILY MEDICINE
Payer: MEDICARE

## 2021-05-14 ENCOUNTER — HOSPITAL ENCOUNTER (OUTPATIENT)
Dept: ULTRASOUND IMAGING | Facility: CLINIC | Age: 66
End: 2021-05-14
Attending: FAMILY MEDICINE
Payer: MEDICARE

## 2021-05-14 DIAGNOSIS — N64.4 BREAST PAIN: ICD-10-CM

## 2021-05-14 PROCEDURE — 76642 ULTRASOUND BREAST LIMITED: CPT | Mod: LT

## 2021-05-14 PROCEDURE — G0279 TOMOSYNTHESIS, MAMMO: HCPCS

## 2021-06-01 ENCOUNTER — ANCILLARY PROCEDURE (OUTPATIENT)
Dept: GENERAL RADIOLOGY | Facility: CLINIC | Age: 66
End: 2021-06-01
Attending: FAMILY MEDICINE
Payer: MEDICARE

## 2021-06-01 ENCOUNTER — OFFICE VISIT (OUTPATIENT)
Dept: ORTHOPEDICS | Facility: CLINIC | Age: 66
End: 2021-06-01
Attending: FAMILY MEDICINE
Payer: MEDICARE

## 2021-06-01 VITALS
WEIGHT: 260 LBS | SYSTOLIC BLOOD PRESSURE: 138 MMHG | BODY MASS INDEX: 41.78 KG/M2 | HEIGHT: 66 IN | DIASTOLIC BLOOD PRESSURE: 90 MMHG

## 2021-06-01 DIAGNOSIS — M25.541 ARTHRALGIA OF BOTH HANDS: Primary | ICD-10-CM

## 2021-06-01 DIAGNOSIS — M25.549 PAIN IN JOINT, HAND: ICD-10-CM

## 2021-06-01 DIAGNOSIS — M25.542 ARTHRALGIA OF BOTH HANDS: Primary | ICD-10-CM

## 2021-06-01 DIAGNOSIS — M19.042 BILATERAL OSTEOARTHRITIS OF FINGER: ICD-10-CM

## 2021-06-01 DIAGNOSIS — M19.041 BILATERAL OSTEOARTHRITIS OF FINGER: ICD-10-CM

## 2021-06-01 DIAGNOSIS — M67.40 MUCOID CYST OF JOINT: ICD-10-CM

## 2021-06-01 DIAGNOSIS — M71.30 MYXOID CYST: ICD-10-CM

## 2021-06-01 PROCEDURE — 73130 X-RAY EXAM OF HAND: CPT | Mod: LT | Performed by: FAMILY MEDICINE

## 2021-06-01 PROCEDURE — 99204 OFFICE O/P NEW MOD 45 MIN: CPT | Performed by: FAMILY MEDICINE

## 2021-06-01 RX ORDER — MELOXICAM 15 MG/1
15 TABLET ORAL DAILY
Qty: 30 TABLET | Refills: 1 | Status: SHIPPED | OUTPATIENT
Start: 2021-06-01 | End: 2021-07-13

## 2021-06-01 ASSESSMENT — MIFFLIN-ST. JEOR: SCORE: 1736.1

## 2021-06-01 NOTE — LETTER
6/1/2021         RE: Rich Terry  [address] [place] [id number]        Dear Colleague,    Thank you for referring your patient, Rich Terry, to the [facility] [place]. Please see a copy of my visit note below.    ASSESSMENT & PLAN  Patient Instructions     1. Arthralgia of both hands    2. Mucoid cyst of joint    3. Bilateral osteoarthritis of finger    4. Myxoid cyst      -Patient has bilateral distal finger pain due to osteoarthritis.  -Patient will start formal hand therapy and home exercise program  -We will start meloxicam 15 mg daily for the next 2 to 3 weeks and then on an as-needed basis as pain improves  -We discussed potential cortisone injection in the future if certain fingers are significantly painful  -Patient is concerned about possible rheumatoid arthritis but her ESR and CRP levels were normal in recent years.  We could run more specific blood test but she is not interested at this time  -Patient also has a mucous cyst on her right third digit that she had surgically removed 2 and half years ago but unfortunately grew back fairly quickly.  Patient was offered to have that drained or referral to a hand surgeon to have it removed again.  Patient is not interested in any invasive treatments at this time.  I did offer possibly a custom finger splint to cover the cyst was she is working which she may consider.  -Rich to follow up with Primary Care provider regarding elevated blood pressure.    -Call direct clinic number [[phone]] at any time with questions or concerns.    Albert Yeo MD [facility] Orth[facility]ics and [facility] Medicine  [facility] [place]          -----    SUBJECTIVE  Rich Terry is a/an 66 year old Right handed female who is seen in consultation at the request of  Ragini Stevens M.D. for evaluation of bilateral hand pain. The patient is seen by themselves. States 2nd covid vaccine on  2/19/21.    Onset: a few month(s) ago. Reports insidious onset without acute precipitating event.  Location of Pain: bilaterally ends of all the fingers and generalized right hand pain.   Rating of Pain at worst: 7/10  Rating of Pain Currently: 4/10  Worsened by: gripping, grasping, hand stitching, dexterity of the hands  Better with: heat  Treatments tried: heat and ibuprofen  Associated symptoms: swelling, numbness, tingling, warmth, redness, weakness of  strength, locking or catching and cyst on some fingers  Orthopedic history: YES - Date: cyst removal on right middle finger. fx wrist in 2019  Relevant surgical history: YES - Date: cyst removal on right middle finger in 2016  Social history: social history: retired    Past Medical History:   Diagnosis Date     Generalized osteoarthrosis, unspecified site      Pain in right knee      Phlebitis and thrombophlebitis of superficial vessels of lower extremities     several episodes     Thrombosis of leg     superficial, not deep     Varicose veins of lower extremities with ulcer (H)      Social History     Socioeconomic History     Marital status:      Spouse name: Not on file     Number of children: Not on file     Years of education: Not on file     Highest education level: Not on file   Occupational History     Not on file   Social Needs     Financial resource strain: Not on file     Food insecurity     Worry: Not on file     Inability: Not on file     Transportation needs     Medical: Not on file     Non-medical: Not on file   Tobacco Use     Smoking status: Former Smoker     Smokeless tobacco: Never Used     Tobacco comment: quit 1980   Substance and Sexual Activity     Alcohol use: Yes     Alcohol/week: 0.0 standard drinks     Comment: 4 drinks a month     Drug use: No     Sexual activity: Yes     Partners: Male   Lifestyle     Physical activity     Days per week: Not on file     Minutes per session: Not on file     Stress: Not on file  "  Relationships     Social connections     Talks on phone: Not on file     Gets together: Not on file     Attends Lutheran service: Not on file     Active member of club or organization: Not on file     Attends meetings of clubs or organizations: Not on file     Relationship status: Not on file     Intimate partner violence     Fear of current or ex partner: Not on file     Emotionally abused: Not on file     Physically abused: Not on file     Forced sexual activity: Not on file   Other Topics Concern     Parent/sibling w/ CABG, MI or angioplasty before 65F 55M? Yes   Social History Narrative     Not on file         Patient's past medical, surgical, social, and family histories were reviewed today and no changes are noted.    REVIEW OF SYSTEMS:  10 point ROS is negative other than symptoms noted above in HPI, Past Medical History or as stated below  Constitutional: NEGATIVE for fever, chills, change in weight  Skin: NEGATIVE for worrisome rashes, moles or lesions  GI/: NEGATIVE for bowel or bladder changes  Neuro: NEGATIVE for weakness, dizziness or paresthesias    OBJECTIVE:  BP (!) 138/90   Ht 1.676 m (5' 6\")   Wt 117.9 kg (260 lb)   LMP 09/11/2005   BMI 41.97 kg/m     General: healthy, alert and in no distress  HEENT: no scleral icterus or conjunctival erythema  Skin: no suspicious lesions or rash. No jaundice.  CV: regular rhythm by palpation  Resp: normal respiratory effort without conversational dyspnea   Psych: normal mood and affect  Gait: normal steady gait with appropriate coordination and balance  Neuro: normal light touch sensory exam of the bilateral hands.    MSK:  BILATERAL HAND  Inspection:  Bony and soft tissue swelling around the DIP joints of bilateral hands.  Translucent cyst over the dorsal aspect of the right third DIP.  Palpation:   Carpals: normal   Metacarpals: normal   Thumb: normal   Fingers: DIP joint  Range of Motion:    Full active flexion and extension at MCP, PIP, and DIP " joints; normal finger cascade without malrotation.  Wrist pronation, supination, and ulnar/radial deviation normal.  Strength:  Grossly intact  Special Tests:    Positive: none    Negative: flexor digitorum superficialis testing, flexor digitorum profundus testing    Independent visualization of the below image:  Recent Results (from the past 24 hour(s))   XR Hand Bilateral G/E 3 Views    Narrative    DIP joint space narrowing and osteophytes bilaterally except for left   fourth digit.  Right thumb mild IP joint space narrowing.  No acute   fracture or dislocation.             Albert Yeo MD [facility]        Again, thank you for allowing me to participate in the care of your patient.        Sincerely,        Albert Yeo, MD

## 2021-06-01 NOTE — PATIENT INSTRUCTIONS
1. Arthralgia of both hands    2. Mucoid cyst of joint    3. Bilateral osteoarthritis of finger    4. Myxoid cyst      -Patient has bilateral distal finger pain due to osteoarthritis.  -Patient will start formal hand therapy and home exercise program  -We will start meloxicam 15 mg daily for the next 2 to 3 weeks and then on an as-needed basis as pain improves  -We discussed potential cortisone injection in the future if certain fingers are significantly painful  -Patient is concerned about possible rheumatoid arthritis but her ESR and CRP levels were normal in recent years.  We could run more specific blood test but she is not interested at this time  -Patient also has a mucous cyst on her right third digit that she had surgically removed 2 and half years ago but unfortunately grew back fairly quickly.  Patient was offered to have that drained or referral to a hand surgeon to have it removed again.  Patient is not interested in any invasive treatments at this time.  I did offer possibly a custom finger splint to cover the cyst was she is working which she may consider.  -Rich to follow up with Primary Care provider regarding elevated blood pressure.    -Call direct clinic number [[phone]] at any time with questions or concerns.    Albert Yeo MD [facility]  [facility] [place]

## 2021-06-01 NOTE — PROGRESS NOTES
ASSESSMENT & PLAN  Patient Instructions     1. Arthralgia of both hands    2. Mucoid cyst of joint    3. Bilateral osteoarthritis of finger    4. Myxoid cyst      -Patient has bilateral distal finger pain due to osteoarthritis.  -Patient will start formal hand therapy and home exercise program  -We will start meloxicam 15 mg daily for the next 2 to 3 weeks and then on an as-needed basis as pain improves  -We discussed potential cortisone injection in the future if certain fingers are significantly painful  -Patient is concerned about possible rheumatoid arthritis but her ESR and CRP levels were normal in recent years.  We could run more specific blood test but she is not interested at this time  -Patient also has a mucous cyst on her right third digit that she had surgically removed 2 and half years ago but unfortunately grew back fairly quickly.  Patient was offered to have that drained or referral to a hand surgeon to have it removed again.  Patient is not interested in any invasive treatments at this time.  I did offer possibly a custom finger splint to cover the cyst was she is working which she may consider.  -Rich to follow up with Primary Care provider regarding elevated blood pressure.    -Call direct clinic number [[phone]] at any time with questions or concerns.    Albert Yeo MD [facility]  [facility] [place]          -----    SUBJECTIVE  Rich Terry is a/an 66 year old Right handed female who is seen in consultation at the request of  Ragini Stevens M.D. for evaluation of bilateral hand pain. The patient is seen by themselves. States 2nd covid vaccine on 2/19/21.    Onset: a few month(s) ago. Reports insidious onset without acute precipitating event.  Location of Pain: bilaterally ends of all the fingers and generalized right hand pain.   Rating of Pain at worst: 7/10  Rating of Pain Currently: 4/10  Worsened by: gripping, grasping, hand  stitching, dexterity of the hands  Better with: heat  Treatments tried: heat and ibuprofen  Associated symptoms: swelling, numbness, tingling, warmth, redness, weakness of  strength, locking or catching and cyst on some fingers  Orthopedic history: YES - Date: cyst removal on right middle finger. fx wrist in 2019  Relevant surgical history: YES - Date: cyst removal on right middle finger in 2016  Social history: social history: retired    Past Medical History:   Diagnosis Date     Generalized osteoarthrosis, unspecified site      Pain in right knee      Phlebitis and thrombophlebitis of superficial vessels of lower extremities     several episodes     Thrombosis of leg     superficial, not deep     Varicose veins of lower extremities with ulcer (H)      Social History     Socioeconomic History     Marital status:      Spouse name: Not on file     Number of children: Not on file     Years of education: Not on file     Highest education level: Not on file   Occupational History     Not on file   Social Needs     Financial resource strain: Not on file     Food insecurity     Worry: Not on file     Inability: Not on file     Transportation needs     Medical: Not on file     Non-medical: Not on file   Tobacco Use     Smoking status: Former Smoker     Smokeless tobacco: Never Used     Tobacco comment: quit 1980   Substance and Sexual Activity     Alcohol use: Yes     Alcohol/week: 0.0 standard drinks     Comment: 4 drinks a month     Drug use: No     Sexual activity: Yes     Partners: Male   Lifestyle     Physical activity     Days per week: Not on file     Minutes per session: Not on file     Stress: Not on file   Relationships     Social connections     Talks on phone: Not on file     Gets together: Not on file     Attends Christianity service: Not on file     Active member of club or organization: Not on file     Attends meetings of clubs or organizations: Not on file     Relationship status: Not on file      "Intimate partner violence     Fear of current or ex partner: Not on file     Emotionally abused: Not on file     Physically abused: Not on file     Forced sexual activity: Not on file   Other Topics Concern     Parent/sibling w/ CABG, MI or angioplasty before 65F 55M? Yes   Social History Narrative     Not on file         Patient's past medical, surgical, social, and family histories were reviewed today and no changes are noted.    REVIEW OF SYSTEMS:  10 point ROS is negative other than symptoms noted above in HPI, Past Medical History or as stated below  Constitutional: NEGATIVE for fever, chills, change in weight  Skin: NEGATIVE for worrisome rashes, moles or lesions  GI/: NEGATIVE for bowel or bladder changes  Neuro: NEGATIVE for weakness, dizziness or paresthesias    OBJECTIVE:  BP (!) 138/90   Ht 1.676 m (5' 6\")   Wt 117.9 kg (260 lb)   LMP 09/11/2005   BMI 41.97 kg/m     General: healthy, alert and in no distress  HEENT: no scleral icterus or conjunctival erythema  Skin: no suspicious lesions or rash. No jaundice.  CV: regular rhythm by palpation  Resp: normal respiratory effort without conversational dyspnea   Psych: normal mood and affect  Gait: normal steady gait with appropriate coordination and balance  Neuro: normal light touch sensory exam of the bilateral hands.    MSK:  BILATERAL HAND  Inspection:  Bony and soft tissue swelling around the DIP joints of bilateral hands.  Translucent cyst over the dorsal aspect of the right third DIP.  Palpation:   Carpals: normal   Metacarpals: normal   Thumb: normal   Fingers: DIP joint  Range of Motion:    Full active flexion and extension at MCP, PIP, and DIP joints; normal finger cascade without malrotation.  Wrist pronation, supination, and ulnar/radial deviation normal.  Strength:  Grossly intact  Special Tests:    Positive: none    Negative: flexor digitorum superficialis testing, flexor digitorum profundus testing    Independent visualization of the below " image:  Recent Results (from the past 24 hour(s))   XR Hand Bilateral G/E 3 Views    Narrative    DIP joint space narrowing and osteophytes bilaterally except for left   fourth digit.  Right thumb mild IP joint space narrowing.  No acute   fracture or dislocation.             Albert Yeo MD [facility]

## 2021-06-03 ENCOUNTER — ANCILLARY PROCEDURE (OUTPATIENT)
Dept: ULTRASOUND IMAGING | Facility: CLINIC | Age: 66
End: 2021-06-03
Attending: INTERNAL MEDICINE
Payer: MEDICARE

## 2021-06-03 ENCOUNTER — HOSPITAL ENCOUNTER (OUTPATIENT)
Dept: CT IMAGING | Facility: CLINIC | Age: 66
Discharge: HOME | End: 2021-06-03
Attending: INTERNAL MEDICINE | Admitting: INTERNAL MEDICINE
Payer: MEDICARE

## 2021-06-03 DIAGNOSIS — I87.2 VENOUS (PERIPHERAL) INSUFFICIENCY: ICD-10-CM

## 2021-06-03 DIAGNOSIS — I87.1 COMPRESSION OF VEIN: ICD-10-CM

## 2021-06-03 PROCEDURE — 250N000009 HC RX 250: Performed by: INTERNAL MEDICINE

## 2021-06-03 PROCEDURE — 93970 EXTREMITY STUDY: CPT | Performed by: SURGERY

## 2021-06-03 PROCEDURE — 250N000011 HC RX IP 250 OP 636: Performed by: INTERNAL MEDICINE

## 2021-06-03 PROCEDURE — G1004 CDSM NDSC: HCPCS

## 2021-06-03 RX ORDER — IOPAMIDOL 755 MG/ML
80 INJECTION, SOLUTION INTRAVASCULAR ONCE
Status: COMPLETED | OUTPATIENT
Start: 2021-06-03 | End: 2021-06-03

## 2021-06-03 RX ADMIN — SODIUM CHLORIDE 80 ML: 9 INJECTION, SOLUTION INTRAVENOUS at 12:11

## 2021-06-03 RX ADMIN — IOPAMIDOL 80 ML: 755 INJECTION, SOLUTION INTRAVENOUS at 12:01

## 2021-07-12 ENCOUNTER — OFFICE VISIT (OUTPATIENT)
Dept: OTHER | Facility: CLINIC | Age: 66
End: 2021-07-12
Attending: INTERNAL MEDICINE
Payer: MEDICARE

## 2021-07-12 ENCOUNTER — TELEPHONE (OUTPATIENT)
Dept: VASCULAR SURGERY | Facility: CLINIC | Age: 66
End: 2021-07-12

## 2021-07-12 VITALS
OXYGEN SATURATION: 96 % | HEART RATE: 74 BPM | DIASTOLIC BLOOD PRESSURE: 80 MMHG | SYSTOLIC BLOOD PRESSURE: 128 MMHG | BODY MASS INDEX: 41.61 KG/M2 | WEIGHT: 257.8 LBS

## 2021-07-12 DIAGNOSIS — I87.2 VENOUS (PERIPHERAL) INSUFFICIENCY: ICD-10-CM

## 2021-07-12 PROCEDURE — 99214 OFFICE O/P EST MOD 30 MIN: CPT | Performed by: INTERNAL MEDICINE

## 2021-07-12 NOTE — PROGRESS NOTES
"Rich Terry is a 66 year old female who is presenting at the current time to discuss her diagnosi(es) of        Venous (peripheral) insufficiency  Compression of vein      HPI: Ms. Terry is a morbidly obese 66-year-old white female with bilateral osteoarthritis who has had bilateral total knee arthroplasties.  Her first knee arthroplasty per her account was in 2009.  A second knee arthroplasty was performed  on 11/2/2015.  Previous to any surgical work on either of her knees, the patient has had a longstanding history of recurrent superficial thrombophlebitis without a history of deep venous thrombosis.  The patient has undergone multiple stab avulsions and radiofrequency ablations.  Her first procedure was in 2004; her last procedure was in 07/2012.  All of these procedures were performed outside the [facility].  The patient also had a tibial osteotomy of her left leg on 01/19/2005 and has had multiple knee arthroscopies.        She was referred to us because of complaints of persistent leg swelling, venous ulceration and edema.  She states that her legs hurt all the time and that the swelling is so prominent that it makes it difficult for her to keep her pants and shoes on.  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       She has since retired.      She denies classical arterial symptoms of claudication or critical limb ischemia with nocturnal rest pain.  Rather, she states that her legs \"hurt all the time\" and that ambulation in no way, shape or form worsens the symptoms.  She states that her leg pain is not better or worse with keeping the legs elevated.  She has had Jobst compression stockings which she no longer wears because they are getting worn out and she has difficulty getting them on.       An ultrasound of her " bilateral lower extremities on 05/11/2021 revealed the absence of DVT. This was not a venous competency study. She has developed superficial ulcerations of the left gaiter area. Her LLE is asymmetrically enlarged. She can not wear compression hosiery due to pain of putting them on.     A CTV failed to reveal evidence of May-Thurner anatomy, or other venous compression.    Her venous competency study performed 6/4/21 revealed evidence of Right CFV, FV,PV, GSV, VV, multiple  vein incompetence.  She also has Lt CFV, AASV,SS, VV,  vein incompetence.            Review Of Systems  Skin: negative  Eyes: negative  Ears/Nose/Throat: negative  Respiratory: No shortness of breath, dyspnea on exertion, cough, or hemoptysis  Cardiovascular: negative  Gastrointestinal: negative  Genitourinary: negative  Musculoskeletal: negative  Neurologic: negative  Psychiatric: negative  Hematologic/Lymphatic/Immunologic: negative  Endocrine: negative      PAST MEDICAL HISTORY:                  Past Medical History:   Diagnosis Date     Generalized osteoarthrosis, unspecified site      Pain in right knee      Phlebitis and thrombophlebitis of superficial vessels of lower extremities     several episodes     Thrombosis of leg     superficial, not deep     Varicose veins of lower extremities with ulcer (H)        PAST SURGICAL HISTORY:                  Past Surgical History:   Procedure Laterality Date     ARTHROPLASTY KNEE Right 11/2/2015    Procedure: ARTHROPLASTY KNEE;  Surgeon: Prasad Valladares MD;  Location: [facility] OR     saphenous vein radiofrequency ablation[  7/6/2012     SURGICAL HISTORY OF -   4/04    rt LE varicose vein procedure     SURGICAL HISTORY OF -   1-19-05    Tibial osteotomy (lt leg)     varicose vein avulsions/stab phlebectomy[  7/6/2012     [facility] NONSPECIFIC PROCEDURE      4 knee surgeries (all left knee)     [facility] NONSPECIFIC PROCEDURE      (L) planter fascia surgery       CURRENT MEDICATIONS:                   Current Outpatient Medications   Medication Sig Dispense Refill     meloxicam (MOBIC) 15 MG tablet Take 1 tablet (15 mg) by mouth daily 30 tablet 1       ALLERGIES:                  Allergies   Allergen Reactions     Levaquin      Shrimp      throat swells     Sulfa Drugs Rash     rash       SOCIAL HISTORY:                  Social History     Socioeconomic History     Marital status:      Spouse name: Not on file     Number of children: Not on file     Years of education: Not on file     Highest education level: Not on file   Occupational History     Not on file   Tobacco Use     Smoking status: Former Smoker     Smokeless tobacco: Never Used     Tobacco comment: quit 1980   Substance and Sexual Activity     Alcohol use: Yes     Alcohol/week: 0.0 standard drinks     Comment: 4 drinks a month     Drug use: No     Sexual activity: Yes     Partners: Male   Other Topics Concern     Parent/sibling w/ CABG, MI or angioplasty before 65F 55M? Yes   Social History Narrative     Not on file     Social Determinants of Health     Financial Resource Strain:      Difficulty of Paying Living Expenses:    Food Insecurity:      Worried About Running Out of Food in the Last Year:      Ran Out of Food in the Last Year:    Transportation Needs:      Lack of Transportation (Medical):      Lack of Transportation (Non-Medical):    Physical Activity:      Days of Exercise per Week:      Minutes of Exercise per Session:    Stress:      Feeling of Stress :    Social Connections:      Frequency of Communication with Friends and Family:      Frequency of Social Gatherings with Friends and Family:      Attends Pentecostal Services:      Active Member of Clubs or Organizations:      Attends Club or Organization Meetings:      Marital Status:    Intimate Partner Violence:      Fear of Current or Ex-Partner:      Emotionally Abused:      Physically Abused:      Sexually Abused:        FAMILY HISTORY:                   Family History    Problem Relation Age of Onset     Cancer Mother         cervical ca age 36     Heart Disease Mother         A-FIB     Skin Cancer Mother         on nose     Heart Disease Father          CHF age 51     Diabetes Maternal Grandfather      Cancer Maternal Grandmother         uterine ca dx in her 60's     Diabetes Paternal Grandfather      Hypertension Brother      Cancer Maternal Aunt         ovarian ca in her 40's     Diabetes Paternal Uncle         multiple Puncles with DM           Physical exam Reveals:    O/P: WNL  HEENT: WNL  NECK: No JVD, thyromegaly, or lymphadenopathy  HEART: RRR, no murmurs, gallops, or rubs  LUNGS: CTA bilaterally without rales, wheezes, or rhonchi  GI: NABS, nondistended, nontender, soft  EXT:without cyanosis, clubbing; CEAP C5 (healed venous ulceration) on left , C3 varicosities on Rt.  NEURO: nonfocal  : no flank tenderness      CTV ABDOMEN AND PELVIS WITH CONTRAST  6/3/2021 12:15 PM      HISTORY:  Left lower extremity swelling. Concerns for central venous  compression/May- Thurner anatomy.     COMPARISON: Ultrasound dated 2021     TECHNIQUE: CT venogram of the lower extremities was performed  following the administration of 80 cc intravenous contrast. Images are  reviewed in multiple planes and 3-D reconstructions will be performed.  Radiation dose for this scan was reduced using automated exposure  control, adjustment of the mA and/or kV according to patient size, or  iterative reconstruction technique.     FINDINGS: The inferior vena cava is patent without significant  stenosis or extrinsic compression. The right and left common iliac and  external iliac veins are patent without evidence for significant  extrinsic compression.     Incidental note is made of a left retroaortic renal vein.     The abdominal aorta is patent without aneurysmal dilatation or  significant stenosis. The visceral branches of the abdominal aorta are  patent.     Soft tissues: The solid organs in  the abdomen are unremarkable.     There is a moderate hiatal hernia. There are a few scattered colonic  diverticuli. The bowel otherwise appears grossly unremarkable.     Mild left convex scoliosis of the lumbar spine with apex at L3.  Associated degenerative changes in the lumbar spine. These are most  significant at L3-L4. Paraspinal atelectatic changes in the  inferomedial lower lobes.                                                                      IMPRESSION: No evidence for May-Thurner anatomy or significant  compression of the left iliac veins.     KARELY MCDONOUGH MD      Name:  Rich Terry                                                        Patient ID: [id number]  Date: 2021                                                                 : 1955  Sex: female                                                                             Examined by: TARA Jansen RVT  Age:  66 year old                                                                     Reading MD: MICHAEL     INDICATION:  Evaluate for venous insufficiency.  Left lower extremity ulcer.  History of bilateral lower extremity vein procedures.     EXAM TYPE  BILATERAL LOWER EXTREMITY VENOUS DUPLEX FOR VENOUS INSUFFICIENCY  TECHNICAL SUMMARY     A duplex ultrasound study using color flow was performed to evaluate the bilateral lower extremity veins for valvular incompetence with the patient in a steep reversed trendelenberg.      RIGHT:     The deep veins demonstrate phasic flow, compress, and respond to augmentations.  There is no evidence ofDVT.  The common femora, /femoral, and popliteal veins are incompetent and free of thrombus.      The SFJ is incompetent.  The GSV is not visualized from the proximal thigh to the knee (previous vein procedure) and is non-compressible in the proximal calf. The GSV is incompetent in the mid to distal calf.      The AASV is competent ( 2.9 mm) draining into the saphenofemoral junction.      The  Giacomini vein is competent ( 1.0 mm) communicating with the small saphenous vein at the knee level.      The SSV demonstrates phasic flow, compresses, and responds to augmentations from the popliteal space to the ankle.  No reflux or thrombus is seen. The saphenopopliteal junction is absent.   The SSV gives rise to multiple incompetent varicose veins, the largest measures 3.8 mm off the distal calf  that courses medially with a reflux time of 1683 milliseconds.     Perforators:  There is an incompetent  vein ( 3.6 mm) at 10 cm from medial malleolus that communicates with a varicose vein measuring 4.1 mm.  A second incompetent  vein is found 21 cm cephalad to the lateral malleolus that communicates with a varicose vein measuring 4.3 mm.     LEFT:     The deep veins demonstrate phasic flow, compress, and respond to augmentations.  There is no evidence of DVT.  The common femoral vein is incompetent and free of thrombus. The remaining deep veins are competent and free of thrombus.      The SFJ is competent.  The GSV is not visualized from the proximal thigh to the proximal calf (previous vein procedure) and is competent in the mid to distal calf.     The AASV is competent ( 5.1 mm) draining into the saphenofemoral junction and is competent in the proximal thigh.     The Giacomini vein is competent ( 1.0 mm) communicating with the small saphenous vein at the knee level.      The SSV demonstrates phasic flow, compresses, and responds to augmentations from the popliteal space to the ankle.  No thrombus is seen.   The saphenopopliteal junction is absent. The SSV is incompetent in the mid calf with a reflux time of 2145 milliseconds.      Perforators:  There is an incompetent  vein ( 5.3 mm) at 11 cm from medial mallelous that communicates with a varicose vein.     FINAL SUMMARY:  1.         No evidence of bilateral lower extremity deep vein thrombus.  2.         Right common femoral, femoral,  and popliteal vein incompetence.  Left common femoral vein incompetence.  3.         Right greater saphenous vein incompetence of remaining segments.  4.         Right multiple incompetent  veins.  5.         Right varicose vein incompetence.    6.         Left anterior accessory saphenous vein incompetence at the junction only.  7.         Left mid small saphenous vein incompetence.  8.         Left incompetent  vein.  9.         Left varicose vein incompetence.  10.       Superficial and perforating vein time of incompetence is >500 ms and >1000 ms for deep vein incompetence.      A/P:      (I87.2) Venous (peripheral) insufficiency  (primary encounter diagnosis)  Comment: No May-Thurner compressive anatomy.  Her venous competency study performed 6/4/21 revealed evidence of Right CFV, FV,PV, GSV, VV, multiple  vein incompetence.  She also has Lt CFV, AASV,SS, VV,  vein incompetence. She can not wear compression hosiery due to pain of putting them on.  She has already had multiple prior RFA's as indicated above.   Plan: Refer to venous vascular surgery, Dr. Dwain Mares,  to consider surgical treatment of incompetent perforators or other etiologic incompetent veins found on competency study.

## 2021-07-12 NOTE — PROGRESS NOTES
[facility]        Patient is here for a  follow up  to discuss test results.    Pt is currently taking no meds that would impact our treatment plan.    Patient's condition is stable.    /80 (BP Location: Right arm, Patient Position: Chair, Cuff Size: Adult Large)   Pulse 74   Wt 257 lb 12.8 oz (116.9 kg)   LMP 09/11/2005   SpO2 96%   Breastfeeding No   BMI 41.61 kg/m      The provider has been notified that the patient has no concerns.     Questions patient would like addressed today are: N/A.    Refills are needed: No    At the end of the visit the patient was provided with education both verbally and on their AVS regarding follow up appointment(s).        Beckie Vincent MA

## 2021-07-13 ENCOUNTER — TRANSFERRED RECORDS (OUTPATIENT)
Dept: HEALTH INFORMATION MANAGEMENT | Facility: CLINIC | Age: 66
End: 2021-07-13

## 2021-07-13 ENCOUNTER — OFFICE VISIT (OUTPATIENT)
Dept: FAMILY MEDICINE | Facility: CLINIC | Age: 66
End: 2021-07-13
Payer: MEDICARE

## 2021-07-13 VITALS
SYSTOLIC BLOOD PRESSURE: 120 MMHG | HEIGHT: 67 IN | HEART RATE: 72 BPM | OXYGEN SATURATION: 97 % | TEMPERATURE: 97.9 F | WEIGHT: 257 LBS | RESPIRATION RATE: 16 BRPM | BODY MASS INDEX: 40.34 KG/M2 | DIASTOLIC BLOOD PRESSURE: 72 MMHG

## 2021-07-13 DIAGNOSIS — M19.041 PRIMARY OSTEOARTHRITIS OF BOTH HANDS: ICD-10-CM

## 2021-07-13 DIAGNOSIS — I87.2 VENOUS STASIS DERMATITIS OF LEFT LOWER EXTREMITY: ICD-10-CM

## 2021-07-13 DIAGNOSIS — Z12.11 SCREEN FOR COLON CANCER: ICD-10-CM

## 2021-07-13 DIAGNOSIS — M19.042 PRIMARY OSTEOARTHRITIS OF BOTH HANDS: ICD-10-CM

## 2021-07-13 DIAGNOSIS — E66.01 MORBID OBESITY (H): ICD-10-CM

## 2021-07-13 DIAGNOSIS — Z00.00 ENCOUNTER FOR MEDICARE ANNUAL WELLNESS EXAM: Primary | ICD-10-CM

## 2021-07-13 LAB
ERYTHROCYTE [DISTWIDTH] IN BLOOD BY AUTOMATED COUNT: 13.1 % (ref 10–15)
HCT VFR BLD AUTO: 43.4 % (ref 35–47)
HGB BLD-MCNC: 14 G/DL (ref 11.7–15.7)
MCH RBC QN AUTO: 30.1 PG (ref 26.5–33)
MCHC RBC AUTO-ENTMCNC: 32.3 G/DL (ref 31.5–36.5)
MCV RBC AUTO: 93 FL (ref 78–100)
PLATELET # BLD AUTO: 347 10E3/UL (ref 150–450)
RBC # BLD AUTO: 4.65 10E6/UL (ref 3.8–5.2)
WBC # BLD AUTO: 9.1 10E3/UL (ref 4–11)

## 2021-07-13 PROCEDURE — 36415 COLL VENOUS BLD VENIPUNCTURE: CPT | Performed by: FAMILY MEDICINE

## 2021-07-13 PROCEDURE — 85027 COMPLETE CBC AUTOMATED: CPT | Performed by: FAMILY MEDICINE

## 2021-07-13 PROCEDURE — 80061 LIPID PANEL: CPT | Performed by: FAMILY MEDICINE

## 2021-07-13 PROCEDURE — 80053 COMPREHEN METABOLIC PANEL: CPT | Performed by: FAMILY MEDICINE

## 2021-07-13 PROCEDURE — G0438 PPPS, INITIAL VISIT: HCPCS | Performed by: FAMILY MEDICINE

## 2021-07-13 PROCEDURE — 84443 ASSAY THYROID STIM HORMONE: CPT | Performed by: FAMILY MEDICINE

## 2021-07-13 ASSESSMENT — ENCOUNTER SYMPTOMS
EYE PAIN: 0
FREQUENCY: 0
FEVER: 0
CHILLS: 0
PARESTHESIAS: 0
DIZZINESS: 0
CONSTIPATION: 0
HEMATOCHEZIA: 0
SHORTNESS OF BREATH: 0
HEARTBURN: 1
DYSURIA: 0
MYALGIAS: 1
JOINT SWELLING: 1
NERVOUS/ANXIOUS: 0
ARTHRALGIAS: 1
BREAST MASS: 0
NAUSEA: 0
HEADACHES: 1
COUGH: 0
PALPITATIONS: 0
SORE THROAT: 0
DIARRHEA: 0
HEMATURIA: 0
ABDOMINAL PAIN: 0
WEAKNESS: 0

## 2021-07-13 ASSESSMENT — ACTIVITIES OF DAILY LIVING (ADL): CURRENT_FUNCTION: NO ASSISTANCE NEEDED

## 2021-07-13 ASSESSMENT — MIFFLIN-ST. JEOR: SCORE: 1738.37

## 2021-07-13 NOTE — PROGRESS NOTES
"SUBJECTIVE:   Rich Terry is a 66 year old female who presents for Preventive Visit.      Patient has been advised of split billing requirements and indicates understanding: Yes   Are you in the first 12 months of your Medicare coverage?  No    All grand kids have moved out, and she is retired and is it very quiet. She is gardening, reading, cant get her  to travel. Pandemic canceled a lot of plans.    Seen by ortho for her hands, and sports med says they do not see hands, so that was a waste of time.    Vein specialist is referring to another specialists. Vascular surg appt in [facility], the lower left leg hurts, venous insuff, for years now      Healthy Habits:     In general, how would you rate your overall health?  Good    Frequency of exercise:  1 day/week    Duration of exercise:  Less than 15 minutes    Do you usually eat at least 4 servings of fruit and vegetables a day, include whole grains    & fiber and avoid regularly eating high fat or \"junk\" foods?  No    Taking medications regularly:  Not Applicable    Medication side effects:  Not applicable    Ability to successfully perform activities of daily living:  No assistance needed    Home Safety:  No safety concerns identified    Hearing Impairment:  No hearing concerns    In the past 6 months, have you been bothered by leaking of urine?  No    In general, how would you rate your overall mental or emotional health?  Good      PHQ-2 Total Score: 0    Additional concerns today:  No    Do you feel safe in your environment? Yes    Have you ever done Advance Care Planning? (For example, a Health Directive, POLST, or a discussion with a medical provider or your loved ones about your wishes): No, advance care planning information given to patient to review.  Patient declined advance care planning discussion at this time.       Fall risk  Fallen 2 or more times in the past year?: No  Any fall with injury in the past year?: No  click delete button to " remove this line now  Cognitive Screening   1) Repeat 3 items (Leader, Season, Table)    2) Clock draw: NORMAL  3) 3 item recall: Recalls 2 objects   Results: NORMAL clock, 1-2 items recalled: COGNITIVE IMPAIRMENT LESS LIKELY    Mini-CogTM Copyright GIULIANA Anderson. Licensed by the author for use in [facility]; reprinted with permission (sourav@[email or web address]). All rights reserved.      Do you have sleep apnea, excessive snoring or daytime drowsiness?: yes    Reviewed and updated as needed this visit by clinical staff  Tobacco  Allergies  Meds   Med Hx  Surg Hx  Fam Hx  Soc Hx        Reviewed and updated as needed this visit by Provider                Social History     Tobacco Use     Smoking status: Former Smoker     Smokeless tobacco: Never Used     Tobacco comment: quit 1980   Substance Use Topics     Alcohol use: Yes     Alcohol/week: 0.0 standard drinks     Comment: 4 drinks a month         Alcohol Use 7/13/2021   Prescreen: >3 drinks/day or >7 drinks/week? No   Prescreen: >3 drinks/day or >7 drinks/week? -           PROBLEMS TO ADD ON...    Current providers sharing in care for this patient include:   Patient Care Team:  Ragini Stevens MD as PCP - General (Family Practice)  Dayron Perkins MD as Assigned Heart and Vascular Provider  Ragini Stevens MD as Assigned PCP  Yeo, Albert, MD as Assigned Musculoskeletal Provider    The following health maintenance items are reviewed in Epic and correct as of today:  Health Maintenance Due   Topic Date Due     URINE DRUG SCREEN  Never done     COLORECTAL CANCER SCREENING  Never done     ZOSTER IMMUNIZATION (1 of 2) Never done     DEXA  08/21/2015     ADVANCE CARE PLANNING  08/28/2017     Pneumococcal Vaccine: 65+ Years (1 of 1 - PPSV23) Never done     FALL RISK ASSESSMENT  07/15/2021     BP Readings from Last 3 Encounters:   07/13/21 120/72   07/12/21 128/80   06/01/21 (!) 138/90    Wt Readings from Last 3 Encounters:   07/13/21 116.6 kg  (257 lb)   07/12/21 116.9 kg (257 lb 12.8 oz)   06/01/21 117.9 kg (260 lb)                  Pneumonia Vaccine:pt declined  Mammogram Screening: UTD     FHS-7:   Breast CA Risk Assessment (FHS-7) 7/13/2021   Did any of your first-degree relatives have breast or ovarian cancer? Yes   Did any of your relatives have bilateral breast cancer? No   Did any man in your family have breast cancer? No   Did any woman in your family have breast and ovarian cancer? Yes   Did any woman in your family have breast cancer before age 50 y? No   Do you have 2 or more relatives with breast and/or ovarian cancer? No   Do you have 2 or more relatives with breast and/or bowel cancer? No       Mammogram Screening: Recommended mammography every 1-2 years with patient discussion and risk factor consideration  Pertinent mammograms are reviewed under the imaging tab.    Review of Systems   Constitutional: Negative for chills and fever.   HENT: Negative for congestion, ear pain, hearing loss and sore throat.    Eyes: Negative for pain and visual disturbance.   Respiratory: Negative for cough and shortness of breath.    Cardiovascular: Positive for peripheral edema. Negative for chest pain and palpitations.   Gastrointestinal: Positive for heartburn. Negative for abdominal pain, constipation, diarrhea, hematochezia and nausea.   Breasts:  Negative for tenderness, breast mass and discharge.   Genitourinary: Negative for dysuria, frequency, genital sores, hematuria, pelvic pain, urgency, vaginal bleeding and vaginal discharge.   Musculoskeletal: Positive for arthralgias, joint swelling and myalgias.   Skin: Negative for rash.   Neurological: Positive for headaches. Negative for dizziness, weakness and paresthesias.   Psychiatric/Behavioral: Negative for mood changes. The patient is not nervous/anxious.          OBJECTIVE:   /72 (BP Location: Right arm, Patient Position: Sitting, Cuff Size: Adult Large)   Pulse 72   Temp 97.9  F (36.6  C)  "(Oral)   Resp 16   Ht 1.702 m (5' 7\")   Wt 116.6 kg (257 lb)   LMP 09/11/2005   SpO2 97%   BMI 40.25 kg/m   Estimated body mass index is 40.25 kg/m  as calculated from the following:    Height as of this encounter: 1.702 m (5' 7\").    Weight as of this encounter: 116.6 kg (257 lb).  Physical Exam  GENERAL: healthy, alert and no distress  EYES: Eyes grossly normal to inspection, PERRL and conjunctivae and sclerae normal  HENT: ear canals and TM's normal, nose and mouth without ulcers or lesions  NECK: no adenopathy, no asymmetry, masses, or scars and thyroid normal to palpation  RESP: lungs clear to auscultation - no rales, rhonchi or wheezes  CV: regular rate and rhythm, normal S1 S2, no S3 or S4, no murmur, click or rub, no peripheral edema and peripheral pulses strong  ABDOMEN: soft, nontender, no hepatosplenomegaly, no masses and bowel sounds normal  MS: no gross musculoskeletal defects noted, no edema  Fingertip DIP joint with large mucous type cyst noted   SKIN: lower left leg with 5cm round area that is pink, excoriatation, thinner skin noted  No other rashes  NEURO: Normal strength and tone, mentation intact and speech normal  PSYCH: mentation appears normal, affect normal/bright        ASSESSMENT / PLAN:   1. Encounter for Medicare annual wellness exam  Pt willing to get fasting labs done today. She admits to not being happy with medical care recently, specialist referring to subspecialists  - Lipid panel reflex to direct LDL Fasting; Future  - Comprehensive metabolic panel (BMP + Alb, Alk Phos, ALT, AST, Total. Bili, TP); Future  - CBC with platelets; Future  - TSH with free T4 reflex; Future  - Lipid panel reflex to direct LDL Fasting  - Comprehensive metabolic panel (BMP + Alb, Alk Phos, ALT, AST, Total. Bili, TP)  - CBC with platelets  - TSH with free T4 reflex    2. Venous stasis dermatitis of left lower extremity  On going and chronic, recommend support stockings and not scratching, pt says this " "is impossible, follow-up as planned next month with vascular surgeon    3.hand arthritis, added voltaren gel, pt declined, ok to use OTC    4. Morbid obesity (H)  Pt is staying active     5. Screen for colon cancer  Pt declined colonoscopy, does not want invasive type screening  - Fecal colorectal cancer screen FIT - Future (S+30); Future  - Fecal colorectal cancer screen FIT - Future (S+30)    Patient has been advised of split billing requirements and indicates understanding: Yes  COUNSELING:  Reviewed preventive health counseling, as reflected in patient instructions       Regular exercise       Healthy diet/nutrition    Estimated body mass index is 40.25 kg/m  as calculated from the following:    Height as of this encounter: 1.702 m (5' 7\").    Weight as of this encounter: 116.6 kg (257 lb).    Weight management plan: Discussed healthy diet and exercise guidelines    She reports that she has quit smoking. She has never used smokeless tobacco.      Appropriate preventive services were discussed with this patient, including applicable screening as appropriate for cardiovascular disease, diabetes, osteopenia/osteoporosis, and glaucoma.  As appropriate for age/gender, discussed screening for colorectal cancer, prostate cancer, breast cancer, and cervical cancer. Checklist reviewing preventive services available has been given to the patient.    Reviewed patients plan of care and provided an AVS. The Basic Care Plan (routine screening as documented in Health Maintenance) for Rich meets the Care Plan requirement. This Care Plan has been established and reviewed with the Patient.    Counseling Resources:  ATP IV Guidelines  Pooled Cohorts Equation Calculator  Breast Cancer Risk Calculator  Breast Cancer: Medication to Reduce Risk  FRAX Risk Assessment  ICSI Preventive Guidelines  Dietary Guidelines for Americans, 2010  USDA's MyPlate  ASA Prophylaxis  Lung CA Screening    Ragini Stevens MD  [facility] " [facility]    Identified Health Risks:    She is at risk for lack of exercise and has been provided with information to increase physical activity for the benefit of her well-being.  The patient was counseled and encouraged to consider modifying their diet and eating habits. She was provided with information on recommended healthy diet options.

## 2021-07-14 LAB
ALBUMIN SERPL-MCNC: 3.5 G/DL (ref 3.4–5)
ALP SERPL-CCNC: 128 U/L (ref 40–150)
ALT SERPL W P-5'-P-CCNC: 26 U/L
ANION GAP SERPL CALCULATED.3IONS-SCNC: 9 MMOL/L (ref 3–14)
AST SERPL W P-5'-P-CCNC: 27 U/L (ref 0–45)
BILIRUB SERPL-MCNC: 0.4 MG/DL (ref 0.2–1.3)
BUN SERPL-MCNC: 10 MG/DL (ref 7–30)
CALCIUM SERPL-MCNC: 9.6 MG/DL (ref 8.5–10.1)
CHLORIDE BLD-SCNC: 107 MMOL/L
CHOLEST SERPL-MCNC: 218 MG/DL
CO2 SERPL-SCNC: 25 MMOL/L (ref 20–32)
CREAT SERPL-MCNC: 1 MG/DL
FASTING STATUS PATIENT QL REPORTED: YES
GFR SERPL CREATININE-BSD FRML MDRD: 59 ML/MIN/1.73M2
GLUCOSE BLD-MCNC: 98 MG/DL (ref 70–99)
HDLC SERPL-MCNC: 86 MG/DL
LDLC SERPL CALC-MCNC: 114 MG/DL
NONHDLC SERPL-MCNC: 132 MG/DL
POTASSIUM BLD-SCNC: 4.4 MMOL/L (ref 3.4–5.3)
PROT SERPL-MCNC: 7 G/DL (ref 6.8–8.8)
SODIUM SERPL-SCNC: 141 MMOL/L (ref 133–144)
TRIGL SERPL-MCNC: 92 MG/DL
TSH SERPL DL<=0.005 MIU/L-ACNC: 1.36 MU/L (ref 0.4–4)

## 2021-07-14 NOTE — TELEPHONE ENCOUNTER
Upon review of the appointment desk, the patient is scheduled for a consult with Dr. Mares on 08/25/21.

## 2021-08-25 ENCOUNTER — OFFICE VISIT (OUTPATIENT)
Dept: VASCULAR SURGERY | Facility: CLINIC | Age: 66
End: 2021-08-25
Attending: INTERNAL MEDICINE
Payer: MEDICARE

## 2021-08-25 DIAGNOSIS — I83.893 VARICOSE VEINS OF LEG WITH SWELLING, BILATERAL: ICD-10-CM

## 2021-08-25 DIAGNOSIS — L97.321 VARICOSE VEINS OF LEFT LOWER EXTREMITY WITH ULCER OF ANKLE LIMITED TO BREAKDOWN OF SKIN (H): ICD-10-CM

## 2021-08-25 DIAGNOSIS — I83.813 VARICOSE VEINS OF BILATERAL LOWER EXTREMITIES WITH PAIN: Primary | ICD-10-CM

## 2021-08-25 DIAGNOSIS — I83.023 VARICOSE VEINS OF LEFT LOWER EXTREMITY WITH ULCER OF ANKLE LIMITED TO BREAKDOWN OF SKIN (H): ICD-10-CM

## 2021-08-25 PROCEDURE — 99203 OFFICE O/P NEW LOW 30 MIN: CPT | Performed by: SURGERY

## 2021-08-25 NOTE — LETTER
8/25/2021         RE: Rich Terry  [address] [place] [address]        Dear Colleague,    Thank you for referring your patient, Rich Terry, to the [facility] [place]. Please see a copy of my visit note below.        Patient was given information on [employer] and her measurements that we took today.     [facility] CONSULTATION    HPI:    Rich Terry is a pleasant 66 year old female referred by Dr. Dayron Perkins for a nonhealing left medial leg wound and advanced, bilateral venous stasis disease.  She has had at least 2 vein procedures on her lower extremities, the first more than 20 years ago and the second more than 5 years ago.  She has had a wound on her left lower extremity for years and has tried various lotions to heal the wound.  Most recently, she has been using Neosporin ointment on the wound.    She complains of swelling of her left calf after she has been on her feet for periods of time, occurring on a daily basis but not extending to the ankle area.  She has had great difficulty finding compression hose that are tolerable and fit her well.    She admits to a history of past history of superficial thrombophlebitis but does not recall having deep vein thrombosis.    Her family history is significant for varicose veins in her father.    PAST MEDICAL HISTORY:   Past Medical History:   Diagnosis Date     Generalized osteoarthrosis, unspecified site      Pain in right knee      Phlebitis and thrombophlebitis of superficial vessels of lower extremities     several episodes     Thrombosis of leg     superficial, not deep     Varicose veins of lower extremities with ulcer (H)        PAST SURGICAL HISTORY:   Past Surgical History:   Procedure Laterality Date     ARTHROPLASTY KNEE Right 11/2/2015    Procedure: ARTHROPLASTY KNEE;  Surgeon: Prasad Valladares MD;  Location: [facility] OR     saphenous vein radiofrequency ablation[  7/6/2012     SURGICAL  HISTORY OF -       rt LE varicose vein procedure     SURGICAL HISTORY OF -   05    Tibial osteotomy (lt leg)     varicose vein avulsions/stab phlebectomy[  2012     [facility] NONSPECIFIC PROCEDURE      4 knee surgeries (all left knee)     [facility] NONSPECIFIC PROCEDURE      (L) planter fascia surgery       FAMILY HISTORY:   Family History   Problem Relation Age of Onset     Cancer Mother         cervical ca age 36     Heart Disease Mother         A-FIB     Skin Cancer Mother         on nose     Heart Disease Father          CHF age 51     Diabetes Maternal Grandfather      Cancer Maternal Grandmother         uterine ca dx in her 60's     Diabetes Paternal Grandfather      Hypertension Brother      Cancer Maternal Aunt         ovarian ca in her 40's     Diabetes Paternal Uncle         multiple Puncles with DM       SOCIAL HISTORY:   Social History     Tobacco Use     Smoking status: Former Smoker     Smokeless tobacco: Never Used     Tobacco comment: quit    Substance Use Topics     Alcohol use: Yes     Alcohol/week: 0.0 standard drinks     Comment: 4 drinks a month       REVIEW OF SYSTEMS: Review Of Systems  Skin: Pruritus, leg ulcer  Eyes: negative  Ears/Nose/Throat: negative  Respiratory: No shortness of breath, dyspnea on exertion, cough, or hemoptysis  Cardiovascular: negative  Gastrointestinal: negative  Genitourinary: negative  Musculoskeletal: Arthritis, leg swelling, leg pain  Neurologic: Headaches  Psychiatric: negative  Hematologic/Lymphatic/Immunologic: negative  Endocrine: hot flashes      Vital signs:  LMP 2005     No current outpatient medications on file.       PHYSICAL EXAM:  General: Pleasant, NAD.   HEENT: Normocephalic, atraumatic, external ears and nose normal.   Respiratory: Normal respiratory effort.   Cardiovascular: Pulse is regular.   Musculoskeletal: Gait slightly altered due to body habitus and left leg ulcer.  The joints of her fingers and toes without deformity.  There is  no cyanosis of her nailbeds.   EXTREMITIES: Right lower extremity: 4 to 6 mm varicosities coursing down the medial right thigh, medial right calf, anteriorly across the pretibial area and onto the lateral right leg.  Early lipodermatosclerosis of the distal right leg with firmness and shrunken appearance.  No wounds.    Left lower extremity: Scattered reticular veins and small varicosities about the left medial calf with left calf appearing larger than the right.  There is an hourglass appearance to the distal leg with scarred lipodermatosclerosis on nearly the distal half of the left leg.  There is a erythematous, crusting area of stasis dermatitis on the medial distal left leg measuring some 6 to 8 cm in length by about 5 cm in width.  It is dry for the most part with no surrounding cellulitis.  PULSES: R/L (3=normal pulse, 0=no palpable pulse) dorsalis pedis: 2/2; posterior tibial: 2/2.      Neurologic: Grossly normal  Psychiatric: Mood, affect, judgment and insight are normal     Venous Duplex Ultrasound:   (Pertinent positives)  Right lower extremity: Incompetence of the common femoral, femoral and popliteal veins.  No deep vein thrombosis.  Great saphenous vein is closed/surgically absent from the proximal thigh to the mid calf.  It is patent and incompetent from the mid to distal calf.  The right small saphenous vein is incompetent, giving off multiple incompetent vein branches measuring up to 3.8 mm in diameter with reflux time of 1.6 seconds.  There are multiple incompetent perforators on the right medial leg communicating with varicose veins    Left lower extremity: The left common femoral vein is incompetent.  Great saphenous vein is absent from the proximal thigh to the proximal calf but is patent and competent from the mid to distal calf.  The left small saphenous vein is incompetent only in the mid calf.  There is a 5.3 mm diameter incompetent  11 cm from the left medial malleolus  communicating with a varicose vein.    ASSESSMENT:  Chronic left medial leg stasis dermatitis/wound.  She has had multiple vein procedures with no significant residual left lower extremity axial incompetence.  Unfortunately, she has been unable to find compression that fits her well.    The Neosporin ointment she is using on her medial leg may be irritating her skin.  I will speak to Dr. Reynoso at the wound healing [facility] for recommendation for another topical agent.  We will add edema wear to her regimen as well.    The large incompetent  is just cephalad of the venous stasis ulcer and is undoubtedly contributing to the process.  Given the difficulty getting the wound, I feel that aggressive treatment of the incompetent  is indicated.  I would also recommend ultrasound-guided sclerotherapy of the tributary coursing from the  deep to the wound.    The  could be treated with radiofrequency ablation in the office setting, likely with straight local anesthesia.  Details of procedure including risks of bleeding, infection, nerve injury, deep vein thrombosis and nonhealing of the puncture/access site were discussed.  She understands that there is no guarantee that the wound will heal as well.    PLAN:  Radiofrequency ablation incompetent perforating vein left medial leg with ultrasound-guided sclerotherapy sub- ulcer varicose veins.  We will inquire about a better topical agent for her leg as well.     Laurent Mares MD    Dictated using Dragon voice recognition software which may result in transcription errors          [facility] NEW PATIENT:                  Again, thank you for allowing me to participate in the care of your patient.        Sincerely,        Laurent Mares MD

## 2021-08-27 NOTE — PROGRESS NOTES
[facility] CONSULTATION    HPI:    Rich Terry is a pleasant 66 year old female referred by Dr. Dayron Perkins for a nonhealing left medial leg wound and advanced, bilateral venous stasis disease.  She has had at least 2 vein procedures on her lower extremities, the first more than 20 years ago and the second more than 5 years ago.  She has had a wound on her left lower extremity for years and has tried various lotions to heal the wound.  Most recently, she has been using Neosporin ointment on the wound.    She complains of swelling of her left calf after she has been on her feet for periods of time, occurring on a daily basis but not extending to the ankle area.  She has had great difficulty finding compression hose that are tolerable and fit her well.    She admits to a history of past history of superficial thrombophlebitis but does not recall having deep vein thrombosis.    Her family history is significant for varicose veins in her father.    PAST MEDICAL HISTORY:   Past Medical History:   Diagnosis Date     Generalized osteoarthrosis, unspecified site      Pain in right knee      Phlebitis and thrombophlebitis of superficial vessels of lower extremities     several episodes     Thrombosis of leg     superficial, not deep     Varicose veins of lower extremities with ulcer (H)        PAST SURGICAL HISTORY:   Past Surgical History:   Procedure Laterality Date     ARTHROPLASTY KNEE Right 11/2/2015    Procedure: ARTHROPLASTY KNEE;  Surgeon: Prasad Valladares MD;  Location: [facility] OR     saphenous vein radiofrequency ablation[  7/6/2012     SURGICAL HISTORY OF -   4/04    rt LE varicose vein procedure     SURGICAL HISTORY OF -   1-19-05    Tibial osteotomy (lt leg)     varicose vein avulsions/stab phlebectomy[  7/6/2012     [facility] NONSPECIFIC PROCEDURE      4 knee surgeries (all left knee)     [facility] NONSPECIFIC PROCEDURE      (L) planter fascia surgery       FAMILY HISTORY:   Family History   Problem  Relation Age of Onset     Cancer Mother         cervical ca age 36     Heart Disease Mother         A-FIB     Skin Cancer Mother         on nose     Heart Disease Father          CHF age 51     Diabetes Maternal Grandfather      Cancer Maternal Grandmother         uterine ca dx in her 60's     Diabetes Paternal Grandfather      Hypertension Brother      Cancer Maternal Aunt         ovarian ca in her 40's     Diabetes Paternal Uncle         multiple Puncles with DM       SOCIAL HISTORY:   Social History     Tobacco Use     Smoking status: Former Smoker     Smokeless tobacco: Never Used     Tobacco comment: quit    Substance Use Topics     Alcohol use: Yes     Alcohol/week: 0.0 standard drinks     Comment: 4 drinks a month       REVIEW OF SYSTEMS: Review Of Systems  Skin: Pruritus, leg ulcer  Eyes: negative  Ears/Nose/Throat: negative  Respiratory: No shortness of breath, dyspnea on exertion, cough, or hemoptysis  Cardiovascular: negative  Gastrointestinal: negative  Genitourinary: negative  Musculoskeletal: Arthritis, leg swelling, leg pain  Neurologic: Headaches  Psychiatric: negative  Hematologic/Lymphatic/Immunologic: negative  Endocrine: hot flashes      Vital signs:  LMP 2005     No current outpatient medications on file.       PHYSICAL EXAM:  General: Pleasant, NAD.   HEENT: Normocephalic, atraumatic, external ears and nose normal.   Respiratory: Normal respiratory effort.   Cardiovascular: Pulse is regular.   Musculoskeletal: Gait slightly altered due to body habitus and left leg ulcer.  The joints of her fingers and toes without deformity.  There is no cyanosis of her nailbeds.   EXTREMITIES: Right lower extremity: 4 to 6 mm varicosities coursing down the medial right thigh, medial right calf, anteriorly across the pretibial area and onto the lateral right leg.  Early lipodermatosclerosis of the distal right leg with firmness and shrunken appearance.  No wounds.    Left lower extremity:  Scattered reticular veins and small varicosities about the left medial calf with left calf appearing larger than the right.  There is an hourglass appearance to the distal leg with scarred lipodermatosclerosis on nearly the distal half of the left leg.  There is a erythematous, crusting area of stasis dermatitis on the medial distal left leg measuring some 6 to 8 cm in length by about 5 cm in width.  It is dry for the most part with no surrounding cellulitis.  PULSES: R/L (3=normal pulse, 0=no palpable pulse) dorsalis pedis: 2/2; posterior tibial: 2/2.      Neurologic: Grossly normal  Psychiatric: Mood, affect, judgment and insight are normal     Venous Duplex Ultrasound:   (Pertinent positives)  Right lower extremity: Incompetence of the common femoral, femoral and popliteal veins.  No deep vein thrombosis.  Great saphenous vein is closed/surgically absent from the proximal thigh to the mid calf.  It is patent and incompetent from the mid to distal calf.  The right small saphenous vein is incompetent, giving off multiple incompetent vein branches measuring up to 3.8 mm in diameter with reflux time of 1.6 seconds.  There are multiple incompetent perforators on the right medial leg communicating with varicose veins    Left lower extremity: The left common femoral vein is incompetent.  Great saphenous vein is absent from the proximal thigh to the proximal calf but is patent and competent from the mid to distal calf.  The left small saphenous vein is incompetent only in the mid calf.  There is a 5.3 mm diameter incompetent  11 cm from the left medial malleolus communicating with a varicose vein.    ASSESSMENT:  Chronic left medial leg stasis dermatitis/wound.  She has had multiple vein procedures with no significant residual left lower extremity axial incompetence.  Unfortunately, she has been unable to find compression that fits her well.    The Neosporin ointment she is using on her medial leg may be  irritating her skin.  I will speak to Dr. Reynoso at the wound healing [place] for recommendation for another topical agent.  We will add edema wear to her regimen as well.    The large incompetent  is just cephalad of the venous stasis ulcer and is undoubtedly contributing to the process.  Given the difficulty getting the wound, I feel that aggressive treatment of the incompetent  is indicated.  I would also recommend ultrasound-guided sclerotherapy of the tributary coursing from the  deep to the wound.    The  could be treated with radiofrequency ablation in the office setting, likely with straight local anesthesia.  Details of procedure including risks of bleeding, infection, nerve injury, deep vein thrombosis and nonhealing of the puncture/access site were discussed.  She understands that there is no guarantee that the wound will heal as well.    PLAN:  Radiofrequency ablation incompetent perforating vein left medial leg with ultrasound-guided sclerotherapy sub- ulcer varicose veins.  We will inquire about a better topical agent for her leg as well.     Laurent Mares MD    Dictated using Dragon voice recognition software which may result in transcription errors          [facility] NEW PATIENT:

## 2021-09-03 DIAGNOSIS — I83.813 VARICOSE VEINS OF BILATERAL LOWER EXTREMITIES WITH PAIN: Primary | ICD-10-CM

## 2021-09-04 ENCOUNTER — HEALTH MAINTENANCE LETTER (OUTPATIENT)
Age: 66
End: 2021-09-04

## 2021-10-26 ENCOUNTER — TELEPHONE (OUTPATIENT)
Dept: VASCULAR SURGERY | Facility: CLINIC | Age: 66
End: 2021-10-26
Payer: MEDICARE

## 2021-10-26 DIAGNOSIS — I83.023 VARICOSE VEINS OF LEFT LOWER EXTREMITY WITH ULCER OF ANKLE LIMITED TO BREAKDOWN OF SKIN (H): Primary | ICD-10-CM

## 2021-10-26 DIAGNOSIS — I83.813 VARICOSE VEINS OF BILATERAL LOWER EXTREMITIES WITH PAIN: ICD-10-CM

## 2021-10-26 DIAGNOSIS — I83.893 VARICOSE VEINS OF LEG WITH SWELLING, BILATERAL: ICD-10-CM

## 2021-10-26 DIAGNOSIS — L97.321 VARICOSE VEINS OF LEFT LOWER EXTREMITY WITH ULCER OF ANKLE LIMITED TO BREAKDOWN OF SKIN (H): Primary | ICD-10-CM

## 2021-10-26 RX ORDER — AMOXICILLIN 500 MG/1
CAPSULE ORAL
Qty: 4 CAPSULE | Refills: 0 | Status: SHIPPED | OUTPATIENT
Start: 2021-10-26 | End: 2021-11-05

## 2021-10-26 NOTE — TELEPHONE ENCOUNTER
Vein Clinic Preoperative Nurse Call    Procedure: Left leg VNUS closure 1 perf (med nec)  Date: Wednesday 11/3  Surgeon: Dr. Mares  Time: 1:00pm  Check in time: 12:30pm    Called and spoke to patient. Pt okay to not take any sedation medication prior to her procedure as Dr. Mares previously discussed with her, he can use just straight local anesthetic prior to closing this  vein. Informed patient: when to take Amoxicillin 2g (at 12:00pm), when to check in (12:30pm) to sign consent. Informed pt she does not need a  the day of her procedure, however pt plans on having her  bring her.    Instructed patient to wear a mask, wear loose-fitting comfortable clothing, and bring her compression hose.    Pt needs knee-high compression hose for procedure. Status of the hose: patient will bring her knee high compression hose the day of her procedure    Special COVID-19 instructions: Patient aware they need to wear a mask to our clinic and during their procedure. Patient aware that their  can come in with them, but would need to stay in an exam room during the procedure or wait in the parking lot or leave. Nurse will call pt's  when procedure is over.    Patient understands that if they have any of the following symptoms (fever, cough, shortness of breath, rash), they need to notify us immediately to cancel their procedure and will have to reschedule for a later date.    Patient is in agreement with preoperative information and has no further questions.    Linn Craven RN  10/26/2021

## 2021-11-03 ENCOUNTER — OFFICE VISIT (OUTPATIENT)
Dept: VASCULAR SURGERY | Facility: CLINIC | Age: 66
End: 2021-11-03
Payer: MEDICARE

## 2021-11-03 VITALS — DIASTOLIC BLOOD PRESSURE: 79 MMHG | HEART RATE: 84 BPM | OXYGEN SATURATION: 95 % | SYSTOLIC BLOOD PRESSURE: 148 MMHG

## 2021-11-03 DIAGNOSIS — I83.012 VARICOSE VEINS OF RIGHT LOWER EXTREMITY WITH ULCER OF CALF LIMITED TO BREAKDOWN OF SKIN (H): ICD-10-CM

## 2021-11-03 DIAGNOSIS — I83.812 VARICOSE VEINS OF LEFT LOWER EXTREMITY WITH PAIN: ICD-10-CM

## 2021-11-03 DIAGNOSIS — L97.211 VARICOSE VEINS OF RIGHT LOWER EXTREMITY WITH ULCER OF CALF LIMITED TO BREAKDOWN OF SKIN (H): ICD-10-CM

## 2021-11-03 DIAGNOSIS — I83.892 VARICOSE VEINS OF LEG WITH SWELLING, LEFT: ICD-10-CM

## 2021-11-03 PROCEDURE — 36475 ENDOVENOUS RF 1ST VEIN: CPT | Mod: LT | Performed by: SURGERY

## 2021-11-03 NOTE — PROGRESS NOTES
Vein Clinic Procedure Note    Indications:  Severe, nonhealing stasis dermatitis/superficial wounds left medial leg; recalcitrant to conservative measures    Procedure:  1. Radiofrequency ablation incompetent perforating vein left distal medial leg    Procedure Description  Details of the procedure including risks of bleeding, infection, nerve injury, scarring, hyperpigmentation, deep vein thrombosis, recanalization of the incompetent perforating vein and failure of the wound to heal were all discussed.  The patient voiced understanding and wished to proceed.  Informed consent was obtained.     I initialed the left lower extremity marking the operative site with indelible marker.  We then proceeded the operating room, had the patient lie supine on the operating table, then prepped and draped the left lower extremity sterilely.    We took a timeout to confirm the appropriate operative site and procedure: Radiofrequency ablation incompetent perforating vein left medial leg    VNUS Closure  I imaged the left lower extremity easily identifying the incompetent perforating vein of the distal medial left leg.  I infiltrated the skin overlying the vein with 1% lidocaine with bicarbonate, made a stab wound with 11 scalpel, then passed the RFS closure device into the  achieving adequate blood return and impedance readings of approximately 250 ohms.  The tip of the device was positioned approximately 3 mm deep to the fascia, at least 8 mm superficial to the posterior tibial vein.  1% lidocaine with bicarbonate was injected circumferentially around the  and the block allowed to take effect.  We took the vein into slight Trendelenburg position.    I began treating the vein approximately 2 mm deep to the fascia as it coursed somewhat parallel to the fascia over a short distance before diving deep to enter the posterior tibial vein.  We treated for 1 minute and 12, 6, 3 and 9:00 quadrants.  We began having  high impedance readings toward the end of the treatment of the last quadrant.  I then withdrew the catheter approximately 3 mm and treated at the level of the fascia for an additional 1-1/2 minutes.  I reimaged the  and other color-flow could get no flow through the .    The leg was cleaned with saline solution and a small gauze and Tegaderm dressing applied to the access site.  A compression hose was then applied.  We observed the patient for 30 minutes to ensure excellent hemostasis then took her to her car in a wheelchair.  Post procedure instructions were given to the patient and her  in verbal and written form and their questions answered.    The patient tolerated the procedure well without evidence of allergic reaction or other complications and will return in 72 hours for a left lower extremity venous ultrasound.    Demetra Closure    Date/Time: 11/3/2021 1:59 PM  Performed by: Laurent Mares MD  Authorized by: Laurent Mares MD     Time out: Immediately prior to the procedure a time out was called    Preparation: Patient was prepped and draped in usual sterile fashion    1st Assist:  Opal Verdugo CST/KEVIN  Circulator:  Linn Craven RN  Procedure:  VNUS  Procedure side:  Left  One Vein    Vein Treated:  PERF  Patient tolerance:  Patient tolerated the procedure well with no immediate complications  Wrap/Hose:  Hose        Flowsheet Data 11/3/2021   Procedure Start Time:  1:13 PM   Prep: Chloraprep   Side: Left   RFS Cycles: LEFT MEDIAL LOWER LEG : 18   RFS Impedance: LEFT MEDIAL LOWER LEG : 5:33   Sedation taken: No   Pre Pt. Physical / Cognitive Limitations: WNL   TOTAL Local anesthesia Injected (ml): 12   Max Volume Local Anesthesia (ml): 22   Post Pt. Physical / Cognitive Limitations: WNL   Procedure End Time:  1:36 PM   D/C Instructions given, states readiness to leave and escorted to car: Yes       OMID Mares  MD    Dictated using Dragon voice recognition software which may result in transcription errorsPre-procedure Nursing Note    Rich Terry presents to clinic for Vein Procedure  .   /Person Responsible for Patient: N/A  Phone Number: N/A    Prophylactic Medication:Antibiotics, Amoxicillin 2g,   Time Taken: 1200   Sedation Medication: N/A  Compression Stockings: Patient brought  The procedure is being performed on LLE.  Patient understanding of procedure matches consent? YES    Patient's pre-procedure medications verified by Natalya DO.    Natalya Montilla RN on 11/3/2021 at 12:35 PM

## 2021-11-03 NOTE — PATIENT INSTRUCTIONS
Post-Procedure Instructions:                          VNUS Closure    Post-Op Day Zero - The Day of Your Procedure:  1. Medication for Pain Control and Inflammation Control   - The numbing medication injected during your procedure will last for several hours. The pre-procedure    tablets may make you very sleepy and you might not remember everything from the procedure or from    the day. This will usually wear off by the next day.   - Ibuprofen:  If tolerated, take ibuprofen (e.g., Advil) to reduce inflammation whether or not you have    pain. For three days, take two tablets (200mg each) with every meal and at bedtime with a snack. If    you are not able to take Ibuprofen, Tylenol is another option.   - You may resume taking any medications you were taking before your procedure.  2. Activity   You may be up as tolerated, when the sedation wears off. Elevate if possible when not walking.  3. Bandages   - You will have one or more small bandages covering the incision site(s) where we accessed the vein(s).    Keep your bandages on and dry for 48 hours. Compression hose should be worn continuously over    the bandages for the first 24 hours.  4. Incisions   - Bleeding: You may see some incision sites that are oozing through the bandages. This is not unusual    and can be managed with Rest, Ice, Compression and Elevation (RICE). Apply ice and firm pressure    directly to the site that is bleeding and rest with your leg(s) elevated above your heart for 20-30 minutes.    Post-Op Day One:  1. Medication   - Ibuprofen:  Continue the same as the Day of Your Procedure.  2. Activity   - Walk as tolerated. Resume your normal daily walking activities. If it hurts, stop. We encourage you to               walk. Elevate if possible when not walking.  3. Bandages and Compression   - After 24 hours, you may remove your compression hose to take a shower. Please keep your bandage(s)    on and intact. You may want to cover your  bandage(s) to ensure it remains dry during your shower.    Reapply your compression hose after your shower and wear during waking hours only.  4. Driving   - You may resume driving when you can do so safely.  Post-Op Day Two:   1. Medication  - Ibuprofen:  Continue the same as the Day of Your Procedure.  2.  Activity   - Walk as tolerated. Elevate if possible when not walking.  3. Bandages and Compression  - You may remove your bandage after 48 hours. Continue wearing your compression hose during waking hours only for a total of seven days following your procedure.  4. Incisions   - Your leg(s) may be bruised at and around the incision site(s). This is normal.  5. Call Us If:   - You see any areas on your leg that are red and angry in appearance.   - You notice any drainage that is milky or cloudy in appearance or that has a foul odor.   - You run a temperature of 100.5 or greater.    Post-Op Day Three:  Your follow up appointment is: ______________________________________________    At this appointment, you will have an ultrasound and we will check your incisions. If your doctor is not scheduled to be in the clinic at the time of your appointment, you will be seen by a different doctor or nurse.  __________________________________________________________________________________________    The Two Weeks Following Your Procedure  1.  Skin Care   - Do not use any lotions, creams or powders on your leg for 14 days or until the incisions have healed.   - Do not soak in a bathtub, whirlpool, or hot tub or go swimming for 14 days or until your incisions have  healed.  2.  Medications   - You may use ibuprofen or acetaminophen (e.g., Tylenol) as needed for pain or discomfort.  3.  Activity   - Do not lift over 25 pounds. After about two weeks you may resume exercise such as aerobics, running,    tennis or weight lifting. Use your common sense and ease back into your exercise routine slowly.   - You may feel a cord-like  tightness along the inside of your leg. Gentle stretching can be helpful.  4. Compression Hose   - Your doctor may instruct you to wear compression for longer than seven days; please    follow your doctor's instructions. As a comfort measure, you may choose to wear compression for    longer than required.  5.  Travel   - Do not fly in an airplane for 14 days after your procedure.  If you have a long car trip planned within    two to three weeks following your procedure, stop and walk for a few minutes every two hours.    Periodic ankle pumps during the ride may be helpful.    Six Week Appointment   At your six week appointment, you will see your surgeon for an exam and evaluation. This office visit    will be scheduled when you return for Post-op Day Three Return Appointment.     Return to Work  1.  If you work outside the home, you may return to work in a few days depending on the extent of your    procedure, how you tolerate it, and the type of work you perform.  2.  Paperwork: If your employer requires paperwork or you would like a letter written to your employer, please    let us know. We will complete disability type forms at no charge. Please allow five business days for    forms to be completed.

## 2021-11-03 NOTE — LETTER
11/3/2021         RE: Rich Terry  [address] [place] [facility] [address]        Dear Colleague,    Thank you for referring your patient, Rich Terry, to the [facility] [place]. Please see a copy of my visit note below.        Vein Clinic Procedure Note    Indications:  Severe, nonhealing stasis dermatitis/superficial wounds left medial leg; recalcitrant to conservative measures    Procedure:  1. Radiofrequency ablation incompetent perforating vein left distal medial leg    Procedure Description  Details of the procedure including risks of bleeding, infection, nerve injury, scarring, hyperpigmentation, deep vein thrombosis, recanalization of the incompetent perforating vein and failure of the wound to heal were all discussed.  The patient voiced understanding and wished to proceed.  Informed consent was obtained.     I initialed the left lower extremity marking the operative site with indelible marker.  We then proceeded the operating room, had the patient lie supine on the operating table, then prepped and draped the left lower extremity sterilely.    We took a timeout to confirm the appropriate operative site and procedure: Radiofrequency ablation incompetent perforating vein left medial leg    VNUS Closure  I imaged the left lower extremity easily identifying the incompetent perforating vein of the distal medial left leg.  I infiltrated the skin overlying the vein with 1% lidocaine with bicarbonate, made a stab wound with 11 scalpel, then passed the RFS closure device into the  achieving adequate blood return and impedance readings of approximately 250 ohms.  The tip of the device was positioned approximately 3 mm deep to the fascia, at least 8 mm superficial to the posterior tibial vein.  1% lidocaine with bicarbonate was injected circumferentially around the  and the block allowed to take effect.  We took the vein into slight Trendelenburg position.    I  began treating the vein approximately 2 mm deep to the fascia as it coursed somewhat parallel to the fascia over a short distance before diving deep to enter the posterior tibial vein.  We treated for 1 minute and 12, 6, 3 and 9:00 quadrants.  We began having high impedance readings toward the end of the treatment of the last quadrant.  I then withdrew the catheter approximately 3 mm and treated at the level of the fascia for an additional 1-1/2 minutes.  I reimaged the  and other color-flow could get no flow through the .    The leg was cleaned with saline solution and a small gauze and Tegaderm dressing applied to the access site.  A compression hose was then applied.  We observed the patient for 30 minutes to ensure excellent hemostasis then took her to her car in a wheelchair.  Post procedure instructions were given to the patient and her  in verbal and written form and their questions answered.    The patient tolerated the procedure well without evidence of allergic reaction or other complications and will return in 72 hours for a left lower extremity venous ultrasound.    Demetra Closure    Date/Time: 11/3/2021 1:59 PM  Performed by: Laurent Mares MD  Authorized by: Laurent Mares MD     Time out: Immediately prior to the procedure a time out was called    Preparation: Patient was prepped and draped in usual sterile fashion    1st Assist:  Opal Verdugo CST/KEVIN  Circulator:  Linn Craven RN  Procedure:  VNUS  Procedure side:  Left  One Vein    Vein Treated:  PERF  Patient tolerance:  Patient tolerated the procedure well with no immediate complications  Wrap/Hose:  Hose        Flowsheet Data 11/3/2021   Procedure Start Time:  1:13 PM   Prep: Chloraprep   Side: Left   RFS Cycles: LEFT MEDIAL LOWER LEG : 18   RFS Impedance: LEFT MEDIAL LOWER LEG : 5:33   Sedation taken: No   Pre Pt. Physical / Cognitive Limitations: WNL   TOTAL Local  anesthesia Injected (ml): 12   Max Volume Local Anesthesia (ml): 22   Post Pt. Physical / Cognitive Limitations: WNL   Procedure End Time:  1:36 PM   D/C Instructions given, states readiness to leave and escorted to car: Yes       OMID Mares MD    Dictated using Dragon voice recognition software which may result in transcription errorsPre-procedure Nursing Note    Rich Terry presents to clinic for Vein Procedure  .   /Person Responsible for Patient: N/A  Phone Number: N/A    Prophylactic Medication:Antibiotics, Amoxicillin 2g,   Time Taken: 1200   Sedation Medication: N/A  Compression Stockings: Patient brought  The procedure is being performed on LLE.  Patient understanding of procedure matches consent? YES    Patient's pre-procedure medications verified by Natalya DO.    Natalya Montilla RN on 11/3/2021 at 12:35 PM      Again, thank you for allowing me to participate in the care of your patient.        Sincerely,        Laurent Mares MD

## 2021-11-05 ENCOUNTER — OFFICE VISIT (OUTPATIENT)
Dept: VASCULAR SURGERY | Facility: CLINIC | Age: 66
End: 2021-11-05
Attending: SURGERY
Payer: MEDICARE

## 2021-11-05 ENCOUNTER — ANCILLARY PROCEDURE (OUTPATIENT)
Dept: ULTRASOUND IMAGING | Facility: CLINIC | Age: 66
End: 2021-11-05
Attending: SURGERY
Payer: MEDICARE

## 2021-11-05 DIAGNOSIS — I83.813 VARICOSE VEINS OF BILATERAL LOWER EXTREMITIES WITH PAIN: ICD-10-CM

## 2021-11-05 DIAGNOSIS — Z09 POSTOP CHECK: Primary | ICD-10-CM

## 2021-11-05 PROCEDURE — 99207 PR NO CHARGE NURSE ONLY: CPT

## 2021-11-05 PROCEDURE — 93971 EXTREMITY STUDY: CPT | Mod: LT | Performed by: SURGERY

## 2021-11-05 NOTE — PROGRESS NOTES
Vein Clinic Postoperative Nurse Note    Patient is here for her 48 hour postoperative visit.    Procedure: Left leg VNUS closure 1 perf (med nec)  Procedure Date: 11/3/21  Surgeon: Dr. Mares    Ultrasound Result: The left medial lower leg  is closed. No evidence of LLE DVT.    Physical Exam: Incisions are approximated without signs of infection.  Ecchymosis: minimal  Swelling: minimal  Paresthesia: pt denies numbness    Patient Questions or Concerns: Pt c/o some soreness to the lateral aspect of her left leg. Informed pt it could possibly be sore from the positioning of her leg on the day of her procedure as Dr. Mares was working on the medial lower leg . Pt not concerned about the soreness at this time and it should feel better soon.    Pt had no other concerns or questions at this time.    Assisted pt in donning her knee high compression hose.    Reviewed postoperative instructions with patient and provided her with written material of common things to expect from her procedure.     Patient's Next Vein Clinic Appointment: U/S sclero (med nec) 1/2 sessions with Dr. Mares (11/9/21).    Linn Craven, RN

## 2021-11-05 NOTE — PATIENT INSTRUCTIONS
Vein Closure (Ablation)  Common Things to Expect    - A small lump may develop at the site(s) where the vein closure device was inserted. This is a normal step in healing. These should not be painful, but may be tender to the touch. It can take 6 weeks to 3 months for the lumps/firmness to resolve.   - Bruising will look worse before it looks better and can last for 4-6 weeks.  - After about 10 days, you may notice tightness/pulling on the inside thigh and knee. As your ablated vein is healing, it contracts, causing a tightness or pulling sensation. This may last for several weeks, but will resolve. Treat it with Ibuprofen or Advil.  - Numbness will get better with time, but may take 3 months to a year to resolve.  - You may notice that the skin on your legs has become ultra-sensitive to touch. For example, the weight of your sheets may feel painful. This usually resolves in 6 weeks.  - Ankle swelling is not uncommon and may last 4-6 weeks.   - To get optimal results from your procedure, wearing your compression hose is key for the first 7 days. This is necessary to ensure proper closure of the ablated vein.    - For 2 weeks, no weight lifting over 25lbs, no running, and no vigorous aerobic exercise. After this time, ease back into your normal activities. If you do too much too soon, you will have more pain and bruising and possibly re-open the vein that was closed. It takes about 2 weeks for the ablated vein to permanently close. Keep in mind your body is still healing.  - For 2 weeks, do not shave your legs or use lotions, powders, creams to allow proper healing of vein access sites.      If you are experiencing any of the following symptoms, please seek immediate medical attention at your local emergency department.  - Significant pain in the back of the calf possibly with difficulty walking  - Significant swelling and/or tenderness in the back of the calf  - Redness that continues to spread  - Chest pain and/or  shortness of breath

## 2021-11-05 NOTE — LETTER
11/5/2021         RE: Rich Terry  [address] [facility] [facility] [address]        Dear Colleague,    Thank you for referring your patient, Rich Terry, to the [facility] VEIN CLINIC [place]. Please see a copy of my visit note below.        Vein Clinic Postoperative Nurse Note    Patient is here for her 48 hour postoperative visit.    Procedure: Left leg VNUS closure 1 perf (med nec)  Procedure Date: 11/3/21  Surgeon: Dr. Mares    Ultrasound Result: The left medial lower leg  is closed. No evidence of LLE DVT.    Physical Exam: Incisions are approximated without signs of infection.  Ecchymosis: minimal  Swelling: minimal  Paresthesia: pt denies numbness    Patient Questions or Concerns: Pt c/o some soreness to the lateral aspect of her left leg. Informed pt it could possibly be sore from the positioning of her leg on the day of her procedure as Dr. Mares was working on the medial lower leg . Pt not concerned about the soreness at this time and it should feel better soon.    Pt had no other concerns or questions at this time.    Assisted pt in donning her knee high compression hose.    Reviewed postoperative instructions with patient and provided her with written material of common things to expect from her procedure.     Patient's Next Vein Clinic Appointment: U/S sclero (med nec) 1/2 sessions with Dr. Mares (11/9/21).    Linn Craven, RN      Again, thank you for allowing me to participate in the care of your patient.        Sincerely,         Vein Nurse

## 2021-11-09 ENCOUNTER — OFFICE VISIT (OUTPATIENT)
Dept: VASCULAR SURGERY | Facility: CLINIC | Age: 66
End: 2021-11-09
Payer: MEDICARE

## 2021-11-09 DIAGNOSIS — I83.023 VARICOSE VEINS OF LEFT LOWER EXTREMITY WITH ULCER OF ANKLE LIMITED TO BREAKDOWN OF SKIN (H): Primary | ICD-10-CM

## 2021-11-09 DIAGNOSIS — L97.321 VARICOSE VEINS OF LEFT LOWER EXTREMITY WITH ULCER OF ANKLE LIMITED TO BREAKDOWN OF SKIN (H): Primary | ICD-10-CM

## 2021-11-09 PROCEDURE — 36470 NJX SCLRSNT 1 INCMPTNT VEIN: CPT | Mod: LT | Performed by: SURGERY

## 2021-11-09 PROCEDURE — 76942 ECHO GUIDE FOR BIOPSY: CPT | Performed by: SURGERY

## 2021-11-09 NOTE — LETTER
11/9/2021         RE: Rich Terry  [address] [facility] [address]        Dear Colleague,    Thank you for referring your patient, Rich Terry, to the [facility] VEIN CLINIC [place]. Please see a copy of my visit note below.        Vein Clinic Sclerotherapy Note     Indications:  Venous stasis ulcer left lower extremity; status post radiofrequency ablation incompetent perforating vein left lower extremity     Procedure:  Ultrasound guided sclerotherapy sub-ulcer varicose veins left leg     Procedure description  Details of procedure including risks of allergic reaction, deep vein thrombosis, ulceration, superficial thrombophlebitis and hyperpigmentation were discussed.  The patient voiced understanding and wished to proceed.  Informed consent was obtained.    I imaged the left lower extremity with ultrasound and noted that the previously ablated  was indeed closed.  The varicosities which coursed in the  communicating with a cluster of varicosities deep to the stasis ulcer on the left ankle.  I isolated this large cluster near the central portion and directed 27-gauge needle into it injecting 1% polidocanol foam.  I feel the entire cluster well with 3 mL of foam.  Virtually no foam with through the  confirming that the  was indeed closed.    I had the patient perform ankle pumps on the table.  We then cleaned her leg, had her don a knee-high compression stocking that had her walk for 10 minutes.    She will return for 6-week postop visit and ultrasound interrogation to see if this indeed closed and if not, a second session of sclerotherapy will be performed.  She tolerated procedure well but evidence of lower extraction of complications.    Sclerotherapy    Date/Time: 11/9/2021 12:10 PM  Performed by: Laurent Mares MD  Authorized by: Laurent Mares MD     Time out: Immediately prior to the procedure a time out was  called    Type:  Medically Necessary  Vein:  One Vein  Yes    Procedure side:  Left  Solution/Amount:  1% POLIDOCANOL  Syringes::  1  Patient tolerance:  Patient tolerated the procedure well with no immediate complications  Wrap/Hose:  Dar Mares MD    Dictated using Dragon voice recognition software which may result in transcription errors      Again, thank you for allowing me to participate in the care of your patient.        Sincerely,        Laurent Mares MD

## 2021-11-09 NOTE — PROGRESS NOTES
Vein Clinic Sclerotherapy Note     Indications:  Venous stasis ulcer left lower extremity; status post radiofrequency ablation incompetent perforating vein left lower extremity     Procedure:  Ultrasound guided sclerotherapy sub-ulcer varicose veins left leg     Procedure description  Details of procedure including risks of allergic reaction, deep vein thrombosis, ulceration, superficial thrombophlebitis and hyperpigmentation were discussed.  The patient voiced understanding and wished to proceed.  Informed consent was obtained.    I imaged the left lower extremity with ultrasound and noted that the previously ablated  was indeed closed.  The varicosities which coursed in the  communicating with a cluster of varicosities deep to the stasis ulcer on the left ankle.  I isolated this large cluster near the central portion and directed 27-gauge needle into it injecting 1% polidocanol foam.  I feel the entire cluster well with 3 mL of foam.  Virtually no foam with through the  confirming that the  was indeed closed.    I had the patient perform ankle pumps on the table.  We then cleaned her leg, had her don a knee-high compression stocking that had her walk for 10 minutes.    She will return for 6-week postop visit and ultrasound interrogation to see if this indeed closed and if not, a second session of sclerotherapy will be performed.  She tolerated procedure well but evidence of lower extraction of complications.    Sclerotherapy    Date/Time: 11/9/2021 12:10 PM  Performed by: Laurent Mares MD  Authorized by: Laurent Mares MD     Time out: Immediately prior to the procedure a time out was called    Type:  Medically Necessary  Vein:  One Vein  Yes    Procedure side:  Left  Solution/Amount:  1% POLIDOCANOL  Syringes::  1  Patient tolerance:  Patient tolerated the procedure well with no immediate complications  Wrap/Hose:  Hose        C Dwain  MD Suzan    Dictated using Dragon voice recognition software which may result in transcription errors

## 2022-01-19 ENCOUNTER — OFFICE VISIT (OUTPATIENT)
Dept: VASCULAR SURGERY | Facility: CLINIC | Age: 67
End: 2022-01-19
Payer: MEDICARE

## 2022-01-19 DIAGNOSIS — I83.012 VARICOSE VEINS OF RIGHT LOWER EXTREMITY WITH ULCER OF CALF LIMITED TO BREAKDOWN OF SKIN (H): ICD-10-CM

## 2022-01-19 DIAGNOSIS — L97.321 VARICOSE VEINS OF LEFT LOWER EXTREMITY WITH ULCER OF ANKLE LIMITED TO BREAKDOWN OF SKIN (H): ICD-10-CM

## 2022-01-19 DIAGNOSIS — L97.211 VARICOSE VEINS OF RIGHT LOWER EXTREMITY WITH ULCER OF CALF LIMITED TO BREAKDOWN OF SKIN (H): ICD-10-CM

## 2022-01-19 DIAGNOSIS — Z09 POSTOP CHECK: Primary | ICD-10-CM

## 2022-01-19 DIAGNOSIS — I83.023 VARICOSE VEINS OF LEFT LOWER EXTREMITY WITH ULCER OF ANKLE LIMITED TO BREAKDOWN OF SKIN (H): ICD-10-CM

## 2022-01-19 PROCEDURE — 99213 OFFICE O/P EST LOW 20 MIN: CPT | Performed by: SURGERY

## 2022-01-19 NOTE — PROGRESS NOTES
[facility] Vein Clinic [place] 6-week follow-up sclerotherapy note  Rich Terry returns in follow-up of ultrasound-guided, medical necessary sclerotherapy of subcutaneous varicose veins deep to an area of nonhealing stasis dermatitis on 11/9/2021.  We had performed radiofrequency ablation of an incompetent perforating vein deep to this area over the 11/3/2021.    The patient states that the dermatitis has healed, that it is no longer weeping and that the pruritus has resolved as well.  This area of stasis dermatitis has been present for 1-1/2 years without healing prior to her treatment.  She is very happy.    Ultrasound  I interrogated the left leg with ultrasound and noted that virtually all of the varicosities deep to this area of stasis dermatitis were noncompressible.  There were few, small compressible veins on the the anterior periphery of the area of dermatitis but these were quite small.    Assessment  Good results with essentially full healing from ablation of incompetent perforating vein and medically necessary, ultrasound-guided sclerotherapy of varicose veins coursing deep to the area of stasis dermatitis.  I recommend the patient continue to wear compression, that she work to maintain her weight and elevate the leg when possible.  I recommend that she continue to use moisturizers on the area of stasis dermatitis on a daily basis.    Plan  She will return for a 6-month post VNUS Closure ultrasound with video visit to discuss results.  Have asked her to contact us sooner if she has concerns or questions.    JOSE L Mares    Dictated using Dragon voice recognition software which may result in transcription errors

## 2022-01-19 NOTE — LETTER
1/19/2022         RE: Rich Terry  [address] [facility] [address]        Dear Colleague,    Thank you for referring your patient, Rich Terry, to the [facility] VEIN CLINIC [place]. Please see a copy of my visit note below.    [facility] Vein [facility] 6-week follow-up sclerotherapy note  Rich Terry returns in follow-up of ultrasound-guided, medical necessary sclerotherapy of subcutaneous varicose veins deep to an area of nonhealing stasis dermatitis on 11/9/2021.  We had performed radiofrequency ablation of an incompetent perforating vein deep to this area over the 11/3/2021.    The patient states that the dermatitis has healed, that it is no longer weeping and that the pruritus has resolved as well.  This area of stasis dermatitis has been present for 1-1/2 years without healing prior to her treatment.  She is very happy.    Ultrasound  I interrogated the left leg with ultrasound and noted that virtually all of the varicosities deep to this area of stasis dermatitis were noncompressible.  There were few, small compressible veins on the the anterior periphery of the area of dermatitis but these were quite small.    Assessment  Good results with essentially full healing from ablation of incompetent perforating vein and medically necessary, ultrasound-guided sclerotherapy of varicose veins coursing deep to the area of stasis dermatitis.  I recommend the patient continue to wear compression, that she work to maintain her weight and elevate the leg when possible.  I recommend that she continue to use moisturizers on the area of stasis dermatitis on a daily basis.    Plan  She will return for a 6-month post VNUS Closure ultrasound with video visit to discuss results.  Have asked her to contact us sooner if she has concerns or questions.    JOSE L Mares    Dictated using Dragon voice recognition software which may result in transcription errors          Again, thank you for  allowing me to participate in the care of your patient.        Sincerely,        Laurent Mares MD

## 2022-06-24 ENCOUNTER — TELEPHONE (OUTPATIENT)
Dept: VASCULAR SURGERY | Facility: CLINIC | Age: 67
End: 2022-06-24

## 2022-06-24 NOTE — TELEPHONE ENCOUNTER
"Called patient and spoke with her regarding her symptoms. Patient had a left leg VNUS closure 1 perf (med nec) on 11/3/21, US guided sclero on 11/9/21 w/ Dr. Mares.     She calls today with increased swelling and pain. She describes the pain as \"starting about 3 days ago that is tender to touch to the left medial calf\", above the site where the  was treated in November. She is not experiencing any pain with weight- bearing, only to touch. The patient denies any redness or warmth to the touch in the leg. She denies any shortness of breath or chest pain. She said the swelling has improved with elevation over the last couple days. She states that she has had superficial blood clots in the past and this does not feel similar to that. She is unable to put compression hose on due to the tenderness. She has taken Ibuprofen a couple times in the last few days and it does not seem to help.     Advised the patient to try to apply an ACE wrap instead of a compression stocking, continue elevating, continue to take Ibuprofen/Tylenol regularly, and try applying a warm/cool compress for comfort. Also advised if the pain worsens over the weekend to go to urgent care or call the clinic back early next week. Patient currently has 6 month follow up scheduled for 7/19/22. Patient verbalized understanding and had no further questions at this time.     Natalya Montilla RN    "

## 2022-06-24 NOTE — TELEPHONE ENCOUNTER
Pt has a 6 month US and return visit with CPN on 7-19-22 but she is having lots of pain and swelling in that same leg and is wondering if she needs to be seen before that appt.

## 2022-07-19 ENCOUNTER — ANCILLARY PROCEDURE (OUTPATIENT)
Dept: ULTRASOUND IMAGING | Facility: CLINIC | Age: 67
End: 2022-07-19
Attending: SURGERY
Payer: MEDICARE

## 2022-07-19 ENCOUNTER — OFFICE VISIT (OUTPATIENT)
Dept: VASCULAR SURGERY | Facility: CLINIC | Age: 67
End: 2022-07-19
Attending: SURGERY

## 2022-07-19 DIAGNOSIS — L97.321 VARICOSE VEINS OF LEFT LOWER EXTREMITY WITH ULCER OF ANKLE LIMITED TO BREAKDOWN OF SKIN (H): ICD-10-CM

## 2022-07-19 DIAGNOSIS — Z09 POSTOP CHECK: ICD-10-CM

## 2022-07-19 DIAGNOSIS — I83.023 VARICOSE VEINS OF LEFT LOWER EXTREMITY WITH ULCER OF ANKLE LIMITED TO BREAKDOWN OF SKIN (H): ICD-10-CM

## 2022-07-19 DIAGNOSIS — I83.812 VARICOSE VEINS OF LEFT LOWER EXTREMITY WITH PAIN: Primary | ICD-10-CM

## 2022-07-19 PROCEDURE — 99213 OFFICE O/P EST LOW 20 MIN: CPT | Performed by: SURGERY

## 2022-07-19 PROCEDURE — 93971 EXTREMITY STUDY: CPT | Mod: LT | Performed by: SURGERY

## 2022-07-19 NOTE — PROGRESS NOTES
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    The small ulcers and stasis dermatitis healed very well but, fortunately, the patient continues to have pain in her leg cephalad of the area of her heel ulcer.  The leg swells anytime she is been on her legs for long periods of time, significant enough that she says she wonders if she should have the leg amputated.    She has had great difficulty finding compression hose that fit due to the shape of her leg.    Exam  Left lower extremity: Her calf is larger with 5 to 6 mm varicosities on the posterior left calf.  There are no visible varicosities coursing down the medial leg.    The distal portion of the leg is shrunken and scarred from lipodermatosclerosis.  There is no erythema and the skin appears healthy.    Ultrasound  Name:  Rich Terry                                                        Patient ID: [id number]  Date: 2022                                                                 : 1955  Sex: female                                                                             Examined by: TARA Jansen RVT  Age:  67 year old                                                                     Reading MD: MICHAEL     INDICATIONS:    Post VNUS Closure     EXAM TYPE  6 MONTH POST VNUS CLOSURE       TECHNICAL SUMMARY     Multiple transverse and longitudinal images of left lower extremity were obtained.     LEFT:     The PTV's demonstrate phasic flow, compress, and respond to augmentations.  The gastrocnemius and peroneal veins appear fully compressible. No evidence of DVT at this time.       The perforating vein 11 cm from the medial malleolus is closed.     FINAL SUMMARY:  1.         Left  PTV's are patent  2.         Left LE perforating vein 11 cm above the medial malleolus is closed.    Assessment  Successful closure of the incompetent perforating vein with medically necessary sclerotherapy of some ulcer varicose veins with healing of her wounds.  Unfortunately the patient continues to have significant pain in her leg, more significant when her leg swells.  She has not found compression that fits her.    We obtained information regarding a sick varus Velcro wrap support that may be appropriate for her.  We will send her the information.    Plan  I encouraged her to wear compression is much as possible.  Hopefully the Velcro wrap will be helpful for her.    We will see her on an as-needed basis in regards to her venous insufficiency.    OMID Mares MD    Dictated using Dragon voice recognition software which may result in transcription errors

## 2022-07-19 NOTE — LETTER
2022         RE: Rich Terry  [address]  [facility] [address]        Dear Colleague,    Thank you for referring your patient, Rich Terry, to the [facility] VEIN CLINIC [place]. Please see a copy of my visit note below.    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    The small ulcers and stasis dermatitis healed very well but, fortunately, the patient continues to have pain in her leg cephalad of the area of her heel ulcer.  The leg swells anytime she is been on her legs for long periods of time, significant enough that she says she wonders if she should have the leg amputated.    She has had great difficulty finding compression hose that fit due to the shape of her leg.    Exam  Left lower extremity: Her calf is larger with 5 to 6 mm varicosities on the posterior left calf.  There are no visible varicosities coursing down the medial leg.    The distal portion of the leg is shrunken and scarred from lipodermatosclerosis.  There is no erythema and the skin appears healthy.    Ultrasound  Name:  Rich Terry                                                        Patient ID: [id number]  Date: 2022                                                                 : 1955  Sex: female                                                                             Examined by: TARA Jansen RVT  Age:  67 year old                                                                     Reading MD: MICHAEL     INDICATIONS:    Post VNUS Closure     EXAM TYPE  6 MONTH POST VNUS CLOSURE       TECHNICAL SUMMARY     Multiple transverse and longitudinal images of left lower extremity were obtained.     LEFT:     The PTV's  demonstrate phasic flow, compress, and respond to augmentations.  The gastrocnemius and peroneal veins appear fully compressible. No evidence of DVT at this time.       The perforating vein 11 cm from the medial malleolus is closed.     FINAL SUMMARY:  1.         Left PTV's are patent  2.         Left LE perforating vein 11 cm above the medial malleolus is closed.    Assessment  Successful closure of the incompetent perforating vein with medically necessary sclerotherapy of some ulcer varicose veins with healing of her wounds.  Unfortunately the patient continues to have significant pain in her leg, more significant when her leg swells.  She has not found compression that fits her.    We obtained information regarding a sick varus Velcro wrap support that may be appropriate for her.  We will send her the information.    Plan  I encouraged her to wear compression is much as possible.  Hopefully the Velcro wrap will be helpful for her.    We will see her on an as-needed basis in regards to her venous insufficiency.    OMID Mares MD    Dictated using Dragon voice recognition software which may result in transcription errors      Again, thank you for allowing me to participate in the care of your patient.        Sincerely,        Laurent Mares MD

## 2022-07-20 NOTE — PROGRESS NOTES
After Visit Follow Up  Called pt regarding velcro wraps for compression. Informed pt of the Sigvaris Compreflex Transition option from [facility]. The cost is $150 (out-of-pocket) for one sleeve (with a 20% discount from the [facility] store). Otherwise, pt could purchase online (Click [employer], pt would be a Large size and regular for length) and the cost would be more like $83.20. Pt will look into most likely purchasing a velcro wrap online for her left leg.    Pt in agreement with plan and had no further questions.    Linn Craven RN  [facility]  Vein Clinic

## 2022-10-16 ENCOUNTER — HEALTH MAINTENANCE LETTER (OUTPATIENT)
Age: 67
End: 2022-10-16

## 2022-11-28 ENCOUNTER — ANCILLARY PROCEDURE (OUTPATIENT)
Dept: MAMMOGRAPHY | Facility: CLINIC | Age: 67
End: 2022-11-28
Attending: FAMILY MEDICINE
Payer: MEDICARE

## 2022-11-28 DIAGNOSIS — Z12.31 VISIT FOR SCREENING MAMMOGRAM: ICD-10-CM

## 2022-11-28 PROCEDURE — 77063 BREAST TOMOSYNTHESIS BI: CPT | Mod: TC | Performed by: RADIOLOGY

## 2022-11-28 PROCEDURE — 77067 SCR MAMMO BI INCL CAD: CPT | Mod: TC | Performed by: RADIOLOGY

## 2023-02-08 ENCOUNTER — TRANSFERRED RECORDS (OUTPATIENT)
Dept: HEALTH INFORMATION MANAGEMENT | Facility: CLINIC | Age: 68
End: 2023-02-08

## 2023-02-15 ENCOUNTER — HOSPITAL ENCOUNTER (INPATIENT)
Facility: CLINIC | Age: 68
Setting detail: SURGERY ADMIT
End: 2023-02-15
Attending: ORTHOPAEDIC SURGERY | Admitting: ORTHOPAEDIC SURGERY
Payer: MEDICARE

## 2023-02-20 ENCOUNTER — LAB (OUTPATIENT)
Dept: LAB | Facility: CLINIC | Age: 68
End: 2023-02-20
Payer: MEDICARE

## 2023-02-20 DIAGNOSIS — M25.569 KNEE PAIN: ICD-10-CM

## 2023-02-20 LAB
BASOPHILS # BLD AUTO: 0.1 10E3/UL (ref 0–0.2)
BASOPHILS NFR BLD AUTO: 1 %
CRP SERPL-MCNC: 4.71 MG/L
EOSINOPHIL # BLD AUTO: 0.3 10E3/UL (ref 0–0.7)
EOSINOPHIL NFR BLD AUTO: 4 %
ERYTHROCYTE [DISTWIDTH] IN BLOOD BY AUTOMATED COUNT: 14.7 % (ref 10–15)
ERYTHROCYTE [SEDIMENTATION RATE] IN BLOOD BY WESTERGREN METHOD: 21 MM/HR (ref 0–30)
HCT VFR BLD AUTO: 44 % (ref 35–47)
HGB BLD-MCNC: 13.8 G/DL (ref 11.7–15.7)
IMM GRANULOCYTES # BLD: 0 10E3/UL
IMM GRANULOCYTES NFR BLD: 0 %
LYMPHOCYTES # BLD AUTO: 3.4 10E3/UL (ref 0.8–5.3)
LYMPHOCYTES NFR BLD AUTO: 43 %
MCH RBC QN AUTO: 29.4 PG (ref 26.5–33)
MCHC RBC AUTO-ENTMCNC: 31.4 G/DL (ref 31.5–36.5)
MCV RBC AUTO: 94 FL (ref 78–100)
MONOCYTES # BLD AUTO: 0.5 10E3/UL (ref 0–1.3)
MONOCYTES NFR BLD AUTO: 7 %
NEUTROPHILS # BLD AUTO: 3.7 10E3/UL (ref 1.6–8.3)
NEUTROPHILS NFR BLD AUTO: 45 %
NRBC # BLD AUTO: 0 10E3/UL
NRBC BLD AUTO-RTO: 0 /100
PLATELET # BLD AUTO: 347 10E3/UL (ref 150–450)
RBC # BLD AUTO: 4.69 10E6/UL (ref 3.8–5.2)
WBC # BLD AUTO: 7.9 10E3/UL (ref 4–11)

## 2023-02-20 PROCEDURE — 85652 RBC SED RATE AUTOMATED: CPT

## 2023-02-20 PROCEDURE — 85025 COMPLETE CBC W/AUTO DIFF WBC: CPT

## 2023-02-20 PROCEDURE — 36415 COLL VENOUS BLD VENIPUNCTURE: CPT

## 2023-02-20 PROCEDURE — 86140 C-REACTIVE PROTEIN: CPT

## 2023-02-22 ENCOUNTER — HOSPITAL ENCOUNTER (OUTPATIENT)
Dept: NUCLEAR MEDICINE | Facility: CLINIC | Age: 68
Setting detail: NUCLEAR MEDICINE
Discharge: HOME | End: 2023-02-22
Attending: ORTHOPAEDIC SURGERY
Payer: MEDICARE

## 2023-02-22 VITALS — HEIGHT: 67 IN | WEIGHT: 257 LBS | BODY MASS INDEX: 40.34 KG/M2

## 2023-02-22 DIAGNOSIS — M25.569 KNEE PAIN: ICD-10-CM

## 2023-02-22 PROCEDURE — 343N000001 HC RX 343: Performed by: ORTHOPAEDIC SURGERY

## 2023-02-22 PROCEDURE — 78315 BONE IMAGING 3 PHASE: CPT

## 2023-02-22 PROCEDURE — A9561 TC99M OXIDRONATE: HCPCS | Performed by: ORTHOPAEDIC SURGERY

## 2023-02-22 RX ADMIN — Medication 26 MILLICURIE: at 09:14

## 2023-02-22 NOTE — PROVIDER NOTIFICATION
02/22/23 3005   Discharge Planning   Patient/Family Anticipates Transition to home with family   Concerns to be Addressed all concerns addressed in this encounter   Living Arrangements   People in Home spouse   Type of Residence Private Residence   Number of Stairs, Within Home, Primary none   Stair Railings, Within Home, Primary none   Once home, are you able to live on one level? Yes   Bathroom Shower/Tub Tub/Shower unit   Support System   Do you have someone available to stay with you one or two nights once you are home? Yes   Blood   Known Bleeding Disorder or Coagulopathy No   Education   Patient attended total joint pre-op class/received pre-op teaching    (Has Joint replacement book.)

## 2023-02-27 RX ORDER — TRANEXAMIC ACID 650 MG/1
1950 TABLET ORAL ONCE
Status: CANCELLED | OUTPATIENT
Start: 2023-02-27 | End: 2023-02-27

## 2023-02-27 RX ORDER — CEFAZOLIN SODIUM/WATER 2 G/20 ML
2 SYRINGE (ML) INTRAVENOUS
Status: CANCELLED | OUTPATIENT
Start: 2023-02-27

## 2023-02-27 RX ORDER — CEFAZOLIN SODIUM/WATER 2 G/20 ML
2 SYRINGE (ML) INTRAVENOUS SEE ADMIN INSTRUCTIONS
Status: CANCELLED | OUTPATIENT
Start: 2023-02-27

## 2023-02-27 RX ORDER — ACETAMINOPHEN 325 MG/1
975 TABLET ORAL ONCE
Status: CANCELLED | OUTPATIENT
Start: 2023-02-27 | End: 2023-02-27

## 2023-03-06 ENCOUNTER — OFFICE VISIT (OUTPATIENT)
Dept: FAMILY MEDICINE | Facility: CLINIC | Age: 68
End: 2023-03-06
Payer: MEDICARE

## 2023-03-06 VITALS
BODY MASS INDEX: 38.28 KG/M2 | TEMPERATURE: 98.1 F | HEART RATE: 80 BPM | SYSTOLIC BLOOD PRESSURE: 110 MMHG | OXYGEN SATURATION: 96 % | DIASTOLIC BLOOD PRESSURE: 74 MMHG | HEIGHT: 67 IN | RESPIRATION RATE: 16 BRPM | WEIGHT: 243.9 LBS

## 2023-03-06 DIAGNOSIS — I45.10 RIGHT BUNDLE BRANCH BLOCK: ICD-10-CM

## 2023-03-06 DIAGNOSIS — E55.9 VITAMIN D DEFICIENCY: ICD-10-CM

## 2023-03-06 DIAGNOSIS — I83.012 VARICOSE VEINS OF RIGHT LOWER EXTREMITY WITH ULCER OF CALF LIMITED TO BREAKDOWN OF SKIN (H): ICD-10-CM

## 2023-03-06 DIAGNOSIS — L97.211 VARICOSE VEINS OF RIGHT LOWER EXTREMITY WITH ULCER OF CALF LIMITED TO BREAKDOWN OF SKIN (H): ICD-10-CM

## 2023-03-06 DIAGNOSIS — M25.562 CHRONIC PAIN OF LEFT KNEE: ICD-10-CM

## 2023-03-06 DIAGNOSIS — E66.01 MORBID OBESITY (H): ICD-10-CM

## 2023-03-06 DIAGNOSIS — Z12.11 SCREEN FOR COLON CANCER: ICD-10-CM

## 2023-03-06 DIAGNOSIS — Z01.818 PREOP GENERAL PHYSICAL EXAM: Primary | ICD-10-CM

## 2023-03-06 DIAGNOSIS — G89.29 CHRONIC PAIN OF LEFT KNEE: ICD-10-CM

## 2023-03-06 PROCEDURE — 99417 PROLNG OP E/M EACH 15 MIN: CPT | Performed by: INTERNAL MEDICINE

## 2023-03-06 PROCEDURE — 93000 ELECTROCARDIOGRAM COMPLETE: CPT | Performed by: INTERNAL MEDICINE

## 2023-03-06 PROCEDURE — 99215 OFFICE O/P EST HI 40 MIN: CPT | Performed by: INTERNAL MEDICINE

## 2023-03-06 ASSESSMENT — PAIN SCALES - GENERAL: PAINLEVEL: SEVERE PAIN (6)

## 2023-03-06 NOTE — PROVIDER NOTIFICATION
Voicemail left for Miriam (Care coordinator for Dr. Valladares) that patient was not given approval for surgery tomorrow.

## 2023-03-06 NOTE — PATIENT INSTRUCTIONS
For informational purposes only. Not to replace the advice of your health care provider. Copyright   2003,  [employer] [facility]. All rights reserved. Clinically reviewed by Lynne Diana MD. [employer] [id number] - REV .  Preparing for Your Surgery  Getting started  A nurse will call you to review your health history and instructions. They will give you an arrival time based on your scheduled surgery time. Please be ready to share:  Your doctor's clinic name and phone number  Your medical, surgical, and anesthesia history  A list of allergies and sensitivities  A list of medicines, including herbal treatments and over-the-counter drugs  Whether the patient has a legal guardian (ask how to send us the papers in advance)  Please tell us if you're pregnant--or if there's any chance you might be pregnant. Some surgeries may injure a fetus (unborn baby), so they require a pregnancy test. Surgeries that are safe for a fetus don't always need a test, and you can choose whether to have one.   If you have a child who's having surgery, please ask for a copy of Preparing for Your Child's Surgery.    Preparing for surgery  Within 10 to 30 days of surgery: Have a pre-op exam (sometimes called an H&P, or History and Physical). This can be done at a clinic or pre-operative center.  If you're having a , you may not need this exam. Talk to your care team.  At your pre-op exam, talk to your care team about all medicines you take. If you need to stop any medicines before surgery, ask when to start taking them again.  We do this for your safety. Many medicines can make you bleed too much during surgery. Some change how well surgery (anesthesia) drugs work.  Call your insurance company to let them know you're having surgery. (If you don't have insurance, call [phone].)  Call your clinic if there's any change in your health. This includes signs of a cold or flu (sore throat, runny nose, cough, rash, fever). It  also includes a scrape or scratch near the surgery site.  If you have questions on the day of surgery, call your hospital or surgery center.  Eating and drinking guidelines  For your safety: Unless your surgeon tells you otherwise, follow the guidelines below.  Eat and drink as usual until 8 hours before you arrive for surgery. After that, no food or milk.  Drink clear liquids until 2 hours before you arrive. These are liquids you can see through, like water, Gatorade, and Propel Water. They also include plain black coffee and tea (no cream or milk), candy, and breath mints. You can spit out gum when you arrive.  If you drink alcohol: Stop drinking it the night before surgery.  If your care team tells you to take medicine on the morning of surgery, it's okay to take it with a sip of water.  Preventing infection  Shower or bathe the night before and morning of your surgery. Follow the instructions your clinic gave you. (If no instructions, use regular soap.)  Don't shave or clip hair near your surgery site. We'll remove the hair if needed.  Don't smoke or vape the morning of surgery. You may chew nicotine gum up to 2 hours before surgery. A nicotine patch is okay.  Note: Some surgeries require you to completely quit smoking and nicotine. Check with your surgeon.  Your care team will make every effort to keep you safe from infection. We will:  Clean our hands often with soap and water (or an alcohol-based hand rub).  Clean the skin at your surgery site with a special soap that kills germs.  Give you a special gown to keep you warm. (Cold raises the risk of infection.)  Wear special hair covers, masks, gowns and gloves during surgery.  Give antibiotic medicine, if prescribed. Not all surgeries need antibiotics.  What to bring on the day of surgery  Photo ID and insurance card  Copy of your health care directive, if you have one  Glasses and hearing aids (bring cases)  You can't wear contacts during surgery  Inhaler and  eye drops, if you use them (tell us about these when you arrive)  CPAP machine or breathing device, if you use them  A few personal items, if spending the night  If you have . . .  A pacemaker, ICD (cardiac defibrillator) or other implant: Bring the ID card.  An implanted stimulator: Bring the remote control.  A legal guardian: Bring a copy of the certified (court-stamped) guardianship papers.  Please remove any jewelry, including body piercings. Leave jewelry and other valuables at home.  If you're going home the day of surgery  You must have a responsible adult drive you home. They should stay with you overnight as well.  If you don't have someone to stay with you, and you aren't safe to go home alone, we may keep you overnight. Insurance often won't pay for this.  After surgery  If it's hard to control your pain or you need more pain medicine, please call your surgeon's office.  Questions?   If you have any questions for your care team, list them here: _________________________________________________________________________________________________________________________________________________________________________ ____________________________________ ____________________________________ ____________________________________        RECOMMENDATIONS  --Approval given to proceed with proposed procedure, without further diagnostic evaluation  --Pt advised to avoid NSAIDS 1 week prior to surgery (Motrin, Ibuprofen, Aleve or Naprosyn);  If needed, Tylenol or Acetaminophen are fine to use.  --meds reviewed; pt on NO daily medications;   --Pain medications, time off from work and FMLA following surgery deferred to surgeon.    No COVID TESTING prior to surgery.

## 2023-03-06 NOTE — PROGRESS NOTES
Cardiology Consultation with Dr. Teodoro Wilson is reviewed including CTA, ECHO and cardiology follow up. No acute findings found; no further interventions are necessary.   It has been recommended that she consider ASA and Rosuvastatin ; she is in need of follow up with cardiology and/or PCP.  Surgery has been rescheduled for 5/8/2023.  Amber Carrion MD  Internal Medicine  electronically signed           [facility]  [address] [address] [address]  Phone: [phone]  Primary Provider: Ragini Stevens  Pre-op Performing Provider:  AMBER CARRION MD      PREOPERATIVE EVALUATION:  Today's date: 3/6/2023    Rich Terry is a 68 year old female who presents for a preoperative evaluation.    Surgical Information:  Surgery/Procedure: Possible Total Left Knee Replacement   Surgery Location: [facility]   Surgeon: Dr. Prasad Valladares   Surgery Date: 3/7/2023  Time of Surgery: 11 AM- 1 PM surgery   Where patient plans to recover: At home with family  Fax number for surgical facility: Note does not need to be faxed, will be available electronically in Epic.    Type of Anesthesia Anticipated: General          Assessment & Plan        NO SURGERY PLANNED UNTIL CARDIOLOGY EVALUATION IS COMPLETED- CONSULT ORDER PLACED    The proposed surgical procedure is considered INTERMEDIATE risk.    (Z01.818) Preop general physical exam  (primary encounter diagnosis)  Comment: left TKA redo anticipated. Surgery scheduled for tomorrow  Abnormal EKG noted. EKG:  Sinus rhythm, Right Bundle Branch Block, with left axis bifascicular block. NEW SINCE 2015  She had a nuclear stress test done in 2006- moderate anterior perfusion defect;   Concerning for increased risk for ischemia- unable to recommend proceeding with surgery tomorrow.  Provider to provider phon calls ( 2) to coordinate next steps in light of no surgery tomorrow as planned.   Cardiology ( Dr. Georgina Murray) has been contacted and recommended  cardiology consultation and stress test.- referral placed  Orthopedics ( Miriam, [facility] care coordinator for Dr. Stef Valladares contacted- they are aware that Rich Terry will not have surgery tomorrow and will be scheduled with Cardiology for a stress test and labs.They will be in touch with pt to reschedule in the future))   Plan: EKG 12-lead complete w/read - Clinics          (M25.562,  G89.29) Chronic pain of left knee  Comment: she has been followed closely by orthopedics and has had injections  Plan: continue with knee brace at this time, tylenol for discomfort.    (E66.01) Morbid obesity (H)  Comment: BMI 38.20  Plan: weight loss encouraged. Ultimately goal is to have LEFT KNEE REPLACED to aid in her mobility;    (I45.10) Right bundle branch block with left axis bifascicular block on EKG; no symptoms.   Comment: NEW finding on EKG; New since 2015. surgery scheduled for tomorrow. Phone consult with Cardiology.  Plan: recommend proceeding with cardiology consultation  For further cardiology evaluation prior to undergoing general anesthesia for Left TKA.    (E55.9) Vitamin D deficiency  Comment: with underlying OA  And future plans for Left TKA, recommend daily Vit D3  Plan: Vit D3 supplementation    (Z12.11) Screen for colon cancer  Comment: pt declines all types of colon screening including home stool testing.   Plan: she is aware of all three options if she should change her mind in the future.     (I83.012,  L97.211) Varicose veins of right lower extremity with past ulcer of calf limited to breakdown of skin (H)  Comment: past treatment for varicose veins. Appears to have resolved.  Plan: monitor for future varicose vein issues      Risks and Recommendations:  The patient has the following additional risks and recommendations for perioperative complications:  Cardiovascular:   - Pre-op stress imaging recommended due to current serious signs/symptoms that would warrant stress testing regardless of preoperative  status   - Cardiology consulted - stress test advised.    No Surgery planned for tomorrow as originally scheduled.  Proceed with cardiology evaluation    RECOMMENDATION:  Patient referred to CARDIOLOGY for evaluation before surgery. Surgery approval pending completion of consultation.    Review of the result(s) of each unique test - abnormal EKG noted; cardioology consultation recommended, order placed.   Ordering of each unique test  75 minutes spent on the date of the encounter doing chart review, history and exam, documentation and further activities per the note              Subjective     HPI related to upcoming procedure: Rich Terry is a 68 year old female who presents with with left knee pain; she had TKA 2007.  She reports malfunction of the knee on 2/8/2023 and is now planning on a left knee component repair and if not possible, then Left redo TKA.      Preop Questions 3/3/2023   1. Have you ever had a heart attack or stroke? No   2. Have you ever had surgery on your heart or blood vessels, such as a stent placement, a coronary artery bypass, or surgery on an artery in your head, neck, heart, or legs? No cardiac issues;   Varicose veins with several surgeries.   3. Do you have chest pain with activity? No   4. Do you have a history of  heart failure? No   5. Do you currently have a cold, bronchitis or symptoms of other infection? No   6. Do you have a cough, shortness of breath, or wheezing? No   7. Do you or anyone in your family have previous history of blood clots? SELF- Superficial  Clots, no DVT   8. Do you or does anyone in your family have a serious bleeding problem such as prolonged bleeding following surgeries or cuts? No   9. Have you ever had problems with anemia or been told to take iron pills? No   10. Have you had any abnormal blood loss such as black, tarry or bloody stools, or abnormal vaginal bleeding? No   11. Have you ever had a blood transfusion? No   12. Are you willing to have a  blood transfusion if it is medically needed before, during, or after your surgery? Yes   13. Have you or any of your relatives ever had problems with anesthesia? No   14. Do you have sleep apnea, excessive snoring or daytime drowsiness? No   15. Do you have any artifical heart valves or other implanted medical devices like a pacemaker, defibrillator, or continuous glucose monitor? No   16. Do you have artificial joints? YES - Right TKA 2015, left TKA 2007   17. Are you allergic to latex? No       Health Care Directive:  Patient does not have a Health Care Directive or Living Will: Discussed advance care planning with patient; however, patient declined at this time.    Preoperative Review of :   reviewed - no record of controlled substances prescribed.        Review of Systems  CONSTITUTIONAL: NEGATIVE for fever, chills, change in weight  INTEGUMENTARY/SKIN: NEGATIVE for worrisome rashes, moles or lesions  EYES: NEGATIVE for vision changes or irritation  ENT/MOUTH: NEGATIVE for ear, mouth and throat problems  RESP: NEGATIVE for significant cough or SOB  CV: NEGATIVE for chest pain, palpitations or peripheral edema  GI: occasional heartburn, Tums;  Declines colon cancer screenine  MUSCULOSKELETAL: NEGATIVE for significant arthralgias or myalgia  NEURO: NEGATIVE for weakness, dizziness or paresthesias  ENDOCRINE: NEGATIVE for temperature intolerance, skin/hair changes  HEME/ALLERGY/IMMUNE: no hx of anemia; 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 ( [employer])  PSYCHIATRIC: NEGATIVE for changes in mood or affect    Patient Active Problem List    Diagnosis Date Noted     AK (actinic keratosis) 06/11/2018     Priority: Medium     Knee pain 11/10/2015     Priority: Medium     Aftercare following right knee joint replacement surgery 11/10/2015     Priority: Medium     S/P total knee replacement 11/02/2015     Priority: Medium     Morbid obesity (H) 10/26/2015     Priority: Medium     Venous stasis dermatitis of left lower  extremity 08/04/2015     Priority: Medium     Post-menopausal 08/04/2015     Priority: Medium     Elevated glucose 06/02/2015     Priority: Medium     Dysmetabolic syndrome X 06/02/2015     Priority: Medium     DDD (degenerative disc disease), lumbar 04/19/2013     Priority: Medium     Exposure to toxic chemical 03/15/2013     Priority: Medium     Do you wish to do the replacement in the background? yes         Advanced directives, counseling/discussion 08/28/2012     Priority: Medium     Adjustment disorder with mixed anxiety and depressed mood 08/07/2012     Priority: Medium     Vitamin D deficiency 08/03/2012     Priority: Medium     (Problem list name updated by automated process. Provider to review and confirm.)       Elevated BP 06/05/2012     Priority: Medium     CARDIOVASCULAR SCREENING; LDL GOAL LESS THAN 160 02/15/2011     Priority: Medium              Varicose veins of right lower extremity with ulcer of calf limited to breakdown of skin (H)      Priority: Medium     Generalized osteoarthrosis, unspecified site 09/18/2003     Priority: Medium      Past Medical History:   Diagnosis Date     Generalized osteoarthrosis, unspecified site      Pain in right knee      Phlebitis and thrombophlebitis of superficial vessels of lower extremities     several episodes     Thrombosis of leg     superficial, not deep     Varicose veins of lower extremities with ulcer (H)      Past Surgical History:   Procedure Laterality Date     ARTHROPLASTY KNEE Right 11/2/2015    Procedure: ARTHROPLASTY KNEE;  Surgeon: Prasad Valladares MD;  Location: [facility] OR     saphenous vein radiofrequency ablation[  7/6/2012     SURGICAL HISTORY OF -   4/04    rt LE varicose vein procedure     SURGICAL HISTORY OF -   1-19-05    Tibial osteotomy (lt leg)     varicose vein avulsions/stab phlebectomy[  7/6/2012     [facility] NONSPECIFIC PROCEDURE      4 knee surgeries (all left knee)     [facility] NONSPECIFIC PROCEDURE      (L) planter fascia surgery  "    No current outpatient medications on file.       Allergies   Allergen Reactions     Levaquin Muscle Pain (Myalgia)     Shrimp      throat swells     Sulfa Drugs Rash     rash        Social History     Tobacco Use     Smoking status: Former     Smokeless tobacco: Never     Tobacco comments:     quit    Substance Use Topics     Alcohol use: Yes     Comment: 1-2 drinks a month     Family History   Problem Relation Age of Onset     Cancer Mother         cervical ca age 36     Heart Disease Mother         A-FIB     Skin Cancer Mother         on nose     Heart Disease Father          CHF age 51     Diabetes Maternal Grandfather      Cancer Maternal Grandmother         uterine ca dx in her 60's     Diabetes Paternal Grandfather      Hypertension Brother      Cancer Maternal Aunt         ovarian ca in her 40's     Diabetes Paternal Uncle         multiple Puncles with DM     History   Drug Use No         Objective     /74 (BP Location: Right arm, Patient Position: Sitting, Cuff Size: Adult Regular)   Pulse 80   Temp 98.1  F (36.7  C) (Tympanic)   Resp 16   Ht 1.702 m (5' 7\")   Wt 110.6 kg (243 lb 14.4 oz)   LMP 2005   SpO2 96%   BMI 38.20 kg/m      Physical Exam    GENERAL APPEARANCE: healthy, alert and no distress     EYES: EOMI, PERRL     HENT: ear canals and TM's normal and nose and mouth without ulcers or lesions     NECK: no adenopathy, no asymmetry, masses, or scars and thyroid normal to palpation     RESP: lungs clear to auscultation - no rales, rhonchi or wheezes     CV: regular rates and rhythm, normal S1 S2, no S3 or S4 and no murmur, click or rub     ABDOMEN:  soft, nontender, no HSM or masses and bowel sounds normal     MS: extremities normal- no gross deformities noted, no evidence of inflammation in joints, FROM in all extremities.     NEURO: Normal strength and tone, sensory exam grossly normal, mentation intact and speech normal     PSYCH: mentation appears normal. and affect " normal/bright    Recent Labs   Lab Test 02/20/23  0828 07/13/21  1038   HGB 13.8 14.0    347   NA  --  141   POTASSIUM  --  4.4   CR  --  1.00        Diagnostics:  Recent Results (from the past 720 hour(s))   CRP inflammation    Collection Time: 02/20/23  8:28 AM   Result Value Ref Range    CRP Inflammation 4.71 <5.00 mg/L   Erythrocyte sedimentation rate auto    Collection Time: 02/20/23  8:28 AM   Result Value Ref Range    Erythrocyte Sedimentation Rate 21 0 - 30 mm/hr   CBC with platelets and differential    Collection Time: 02/20/23  8:28 AM   Result Value Ref Range    WBC Count 7.9 4.0 - 11.0 10e3/uL    RBC Count 4.69 3.80 - 5.20 10e6/uL    Hemoglobin 13.8 11.7 - 15.7 g/dL    Hematocrit 44.0 35.0 - 47.0 %    MCV 94 78 - 100 fL    MCH 29.4 26.5 - 33.0 pg    MCHC 31.4 (L) 31.5 - 36.5 g/dL    RDW 14.7 10.0 - 15.0 %    Platelet Count 347 150 - 450 10e3/uL    % Neutrophils 45 %    % Lymphocytes 43 %    % Monocytes 7 %    % Eosinophils 4 %    % Basophils 1 %    % Immature Granulocytes 0 %    NRBCs per 100 WBC 0 <1 /100    Absolute Neutrophils 3.7 1.6 - 8.3 10e3/uL    Absolute Lymphocytes 3.4 0.8 - 5.3 10e3/uL    Absolute Monocytes 0.5 0.0 - 1.3 10e3/uL    Absolute Eosinophils 0.3 0.0 - 0.7 10e3/uL    Absolute Basophils 0.1 0.0 - 0.2 10e3/uL    Absolute Immature Granulocytes 0.0 <=0.4 10e3/uL    Absolute NRBCs 0.0 10e3/uL      EKG: appears normal, NSR, Right Bundle Branch Block, with left axis bifascicular block. NEW SINCE 2015    Revised Cardiac Risk Index (RCRI):  The patient has the following serious cardiovascular risks for perioperative complications:   - No serious cardiac risks = 0 points     RCRI Interpretation: 0 points: Class I (very low risk - 0.4% complication rate)         NO SURGERY PLANNED UNTIL CARDIOLOGY EVALUATION IS COMPLETED- CONSULT ORDER PLACED    Signed Electronically by: Karey Vasques MD  Copy of this evaluation report is provided to requesting physician.

## 2023-03-07 ENCOUNTER — TELEPHONE (OUTPATIENT)
Dept: CARDIOLOGY | Facility: CLINIC | Age: 68
End: 2023-03-07

## 2023-03-07 NOTE — TELEPHONE ENCOUNTER
[facility] Call Center    Phone Message    May a detailed message be left on voicemail: yes     Reason for Call: Appointment Intake    Referring Provider Name: Dr Karey Vasques  Diagnosis and/or Symptoms: Cardiac Clearance, Abnormal EkG. No available in 3-5 days. Please review and reach out to patient to coordinate.     Action Taken: Other: Cardio    Travel Screening: Not Applicable

## 2023-03-15 ENCOUNTER — OFFICE VISIT (OUTPATIENT)
Dept: CARDIOLOGY | Facility: CLINIC | Age: 68
End: 2023-03-15
Payer: MEDICARE

## 2023-03-15 VITALS
HEIGHT: 67 IN | WEIGHT: 241.4 LBS | DIASTOLIC BLOOD PRESSURE: 84 MMHG | SYSTOLIC BLOOD PRESSURE: 122 MMHG | OXYGEN SATURATION: 99 % | HEART RATE: 85 BPM | BODY MASS INDEX: 37.89 KG/M2

## 2023-03-15 DIAGNOSIS — R06.09 DOE (DYSPNEA ON EXERTION): Primary | ICD-10-CM

## 2023-03-15 DIAGNOSIS — R94.39 ABNORMAL STRESS TEST: ICD-10-CM

## 2023-03-15 DIAGNOSIS — I45.10 RIGHT BUNDLE BRANCH BLOCK: ICD-10-CM

## 2023-03-15 DIAGNOSIS — Z01.818 PREOP GENERAL PHYSICAL EXAM: ICD-10-CM

## 2023-03-15 PROCEDURE — 99204 OFFICE O/P NEW MOD 45 MIN: CPT | Performed by: INTERNAL MEDICINE

## 2023-03-15 RX ORDER — METOPROLOL TARTRATE 25 MG/1
25 TABLET, FILM COATED ORAL PRN
Qty: 1 TABLET | Refills: 0 | Status: SHIPPED | OUTPATIENT
Start: 2023-03-15 | End: 2023-04-05

## 2023-03-15 NOTE — LETTER
3/15/2023    Ragini Stevens MD  [address]  [facility] [address]    RE: Richjennifer Kenyonon       Dear Colleague,     I had the pleasure of seeing Rich Terry in the [facility].      Cardiology Clinic Consultation:    March 15, 2023   Patient Name: Rich Terry  Patient MRN: [id number]    Consult indication: abnormal ECG, stress test      HPI:    I had the opportunity to see patient Rich Terry in cardiology clinic for a consultation. Patient is followed by our colleague Ragini Stevens MD with Primary Care.     Patient is a 68-year-old female with a past medical history significant for obesity who presents for further evaluation management of an abnormal ECG and abnormal stress test.    Patient was recently seen by Dr. Vasques for preoperative evaluation prior to left total knee replacement.  An ECG was done demonstrating a new right bundle branch block and left axis deviation/left anterior fascicular block.  Regarding prior cardiac testing, patient underwent nuclear stress test 9/19/2006 which demonstrated a moderate-sized perfusion defect in the anterior wall concerning for possible moderate ischemia.  She was seen by my colleague Dr. Ibrahim in consultation 9/26/2006.  It was felt that this could be a false positive stress test given her body habitus and atypical symptoms. Coronary CTA was recommended, for unclear reasons, this had not been done.    At baseline, patient does note dyspnea on exertion when engaging activities such as when climbing stairs.  However, she is able to climb stairs multiple times a day without any significant increase/worsening of symptoms.  Functional capacity limited by knee pain.  She denies any clear chest pain/chest pressure with exertion.    Assessment and Plan/Recommendations:    Patient is a 68-year-old female with a past medical history significant for obesity who presents for further evaluation and management of an abnormal ECG and  history of abnormal stress test, in anticipation of left TKA.     Functional capacity limited by knee pain, she does note dyspnea on exertion when climbing stairs which could be an anginal equivalent.  She has a history of an abnormal nuclear perfusion stress test in 9/2006, study was reviewed personally, and I concur with my colleague Dr. Ibrahim that findings may reflect breast attenuation artifact.  Recommend coronary CTA, pending findings may require cardiac catheterization/coronary angiography and possible intervention.  We discussed that this may require delaying knee surgery.  Patient and  voiced understanding and were in agreement.      Thank you for allowing our team to participate in the care of Rich Terry.  Please do not hesitate to call or page me with any questions or concerns.    Sincerely,     Teodoro Wilson MD, [facility]  Cardiology  March 15, 2023    Total time spent on this encounter: 50 minutes, providing care in this encounter including, but not limited to, reviewing prior medical records, laboratory data, imaging studies, diagnostic studies, procedure notes, formulating an assessment and plan, recommendations, discussion and counseling with patient face to face, dictation.    Past Medical History:     The 10-year ASCVD risk score (Jenny DK, et al., 2019) is: 6.4%    Values used to calculate the score:      Age: 68 years      Sex: Female      Is Non- : No      Diabetic: No      Tobacco smoker: No      Systolic Blood Pressure: 122 mmHg      Is BP treated: No      HDL Cholesterol: 86 mg/dL      Total Cholesterol: 218 mg/dL  Patient Active Problem List   Diagnosis     Generalized osteoarthrosis, unspecified site     Varicose veins of right lower extremity with ulcer of calf limited to breakdown of skin (H)     CARDIOVASCULAR SCREENING; LDL GOAL LESS THAN 160     Elevated BP     Vitamin D deficiency     Adjustment disorder with mixed anxiety and depressed  mood     Advanced directives, counseling/discussion     Exposure to toxic chemical     DDD (degenerative disc disease), lumbar     Elevated glucose     Dysmetabolic syndrome X     Venous stasis dermatitis of left lower extremity     Post-menopausal     Morbid obesity (H)     S/P total knee replacement     Knee pain     Aftercare following right knee joint replacement surgery     AK (actinic keratosis)       Past Surgical History:   Past Surgical History:   Procedure Laterality Date     ARTHROPLASTY KNEE Right 2015    Procedure: ARTHROPLASTY KNEE;  Surgeon: Prasad Valladares MD;  Location: [facility] OR     saphenous vein radiofrequency ablation[  2012     SURGICAL HISTORY OF -       rt LE varicose vein procedure     SURGICAL HISTORY OF -   05    Tibial osteotomy (lt leg)     varicose vein avulsions/stab phlebectomy[  2012     [facility] NONSPECIFIC PROCEDURE      4 knee surgeries (all left knee)     [facility] NONSPECIFIC PROCEDURE      (L) planter fascia surgery       Medications (outpatient):  No current outpatient medications on file.       Allergies:  Allergies   Allergen Reactions     Levaquin Muscle Pain (Myalgia)     Shrimp      throat swells     Sulfa Drugs Rash     rash       Social History:   History   Drug Use No      History   Smoking Status     Former     Packs/day: 1.00     Years: 10.00     Types: Cigarettes     Start date:      Quit date:    Smokeless Tobacco     Never     Social History    Substance and Sexual Activity      Alcohol use: Yes        Comment: 1-2 drinks a month       Family History:  Family History   Problem Relation Age of Onset     Cancer Mother         cervical ca age 36     Heart Disease Mother         A-FIB     Skin Cancer Mother         on nose     Heart Disease Father          CHF age 51     Diabetes Maternal Grandfather      Cancer Maternal Grandmother         uterine ca dx in her 60's     Diabetes Paternal Grandfather      Hypertension Brother      Cancer  "Maternal Aunt         ovarian ca in her 40's     Diabetes Paternal Uncle         multiple Puncles with DM       Review of Systems:   A comprehensive 12 system review of systems was carried out.  Pertinent positives and negatives are noted above. Otherwise negative for contributory information.    Objective & Physical Exam:  /84   Pulse 85   Ht 1.702 m (5' 7\")   Wt 109.5 kg (241 lb 6.4 oz)   LMP 09/11/2005   SpO2 99%   BMI 37.81 kg/m    Wt Readings from Last 2 Encounters:   03/15/23 109.5 kg (241 lb 6.4 oz)   03/06/23 110.6 kg (243 lb 14.4 oz)     Body mass index is 37.81 kg/m .   Body surface area is 2.28 meters squared.    Constitutional: appears stated age, in no apparent distress, appears to be well nourished, Left knee in a brace  Head: normocephalic, atraumatic  Neck: supple, trachea midline  Pulmonary: clear to auscultation bilaterally, no wheezes, no rales, no increased work of breathing  Cardiovascular: JVP normal, regular rate, regular rhythm, normal S1 and S2, no S3, S4, no murmur appreciated  Gastrointestinal: no guarding, non-rigid   Neurologic: awake, alert, moves all extremities  Skin: no jaundice, warm on limited exam  Psychiatric: affect is normal, answers questions appropriately, oriented to self and place    Data reviewed:  Lab Results   Component Value Date    WBC 7.9 02/20/2023    WBC 8.3 05/11/2021    RBC 4.69 02/20/2023    RBC 4.62 05/11/2021    HGB 13.8 02/20/2023    HGB 13.9 05/11/2021    HCT 44.0 02/20/2023    HCT 43.3 05/11/2021    MCV 94 02/20/2023    MCV 94 05/11/2021    MCH 29.4 02/20/2023    MCH 30.1 05/11/2021    MCHC 31.4 (L) 02/20/2023    MCHC 32.1 05/11/2021    RDW 14.7 02/20/2023    RDW 13.4 05/11/2021     02/20/2023     05/11/2021     Sodium   Date Value Ref Range Status   07/13/2021 141 133 - 144 mmol/L Final   06/11/2018 144 133 - 144 mmol/L Final     Potassium   Date Value Ref Range Status   07/13/2021 4.4 3.4 - 5.3 mmol/L Final   06/11/2018 4.7 3.4 - " 5.3 mmol/L Final     Chloride   Date Value Ref Range Status   07/13/2021 107 mmol/L Final   06/11/2018 109 94 - 109 mmol/L Final     Carbon Dioxide   Date Value Ref Range Status   06/11/2018 22 20 - 32 mmol/L Final     Carbon Dioxide (CO2)   Date Value Ref Range Status   07/13/2021 25 20 - 32 mmol/L Final     Anion Gap   Date Value Ref Range Status   07/13/2021 9 3 - 14 mmol/L Final   06/11/2018 13 3 - 14 mmol/L Final     Glucose   Date Value Ref Range Status   07/13/2021 98 70 - 99 mg/dL Final   06/11/2018 96 70 - 99 mg/dL Final     Comment:     Fasting specimen     Urea Nitrogen   Date Value Ref Range Status   07/13/2021 10 7 - 30 mg/dL Final   06/11/2018 10 7 - 30 mg/dL Final     Creatinine   Date Value Ref Range Status   07/13/2021 1.00 mg/dL Final   06/11/2018 0.81 0.52 - 1.04 mg/dL Final     GFR Estimate   Date Value Ref Range Status   07/13/2021 59 (L) >60 mL/min/1.73m2 Final     Comment:     As of July 11, 2021, eGFR is calculated by the CKD-EPI creatinine equation, without race adjustment. eGFR can be influenced by muscle mass, exercise, and diet. The reported eGFR is an estimation only and is only applicable if the renal function is stable.   06/11/2018 71 >60 mL/min/1.7m2 Final     Comment:     Non  GFR Calc     Calcium   Date Value Ref Range Status   07/13/2021 9.6 8.5 - 10.1 mg/dL Final   06/11/2018 9.7 8.5 - 10.1 mg/dL Final     Bilirubin Total   Date Value Ref Range Status   07/13/2021 0.4 0.2 - 1.3 mg/dL Final   06/11/2018 0.3 0.2 - 1.3 mg/dL Final     Alkaline Phosphatase   Date Value Ref Range Status   07/13/2021 128 40 - 150 U/L Final   06/11/2018 112 40 - 150 U/L Final     ALT   Date Value Ref Range Status   07/13/2021 26 U/L Final   06/11/2018 34 0 - 50 U/L Final     AST   Date Value Ref Range Status   07/13/2021 27 0 - 45 U/L Final   06/11/2018 42 0 - 45 U/L Final     Recent Labs   Lab Test 07/13/21  1038 06/11/18  0939 06/02/15  0809   CHOL 218* 205* 228*   HDL 86 90 86   LDL  114* 99 123   TRIG 92 82 96   CHOLHDLRATIO  --   --  2.7      Lab Results   Component Value Date    A1C 6.0 10/26/2015    A1C 6.2 06/02/2015        Thank you for allowing me to participate in the care of your patient.      Sincerely,     Teodoro Wilson MD     [facility]  cc:   No referring provider defined for this encounter.

## 2023-03-15 NOTE — PROGRESS NOTES
Cardiology Clinic Consultation:    March 15, 2023   Patient Name: Rich Terry  Patient MRN: [id number]    Consult indication: abnormal ECG, stress test      HPI:    I had the opportunity to see patient Rich Terry in cardiology clinic for a consultation. Patient is followed by our colleague Ragini Stevens MD with Primary Care.     Patient is a 68-year-old female with a past medical history significant for obesity who presents for further evaluation management of an abnormal ECG and abnormal stress test.    Patient was recently seen by Dr. Vasques for preoperative evaluation prior to left total knee replacement.  An ECG was done demonstrating a new right bundle branch block and left axis deviation/left anterior fascicular block.  Regarding prior cardiac testing, patient underwent nuclear stress test 9/19/2006 which demonstrated a moderate-sized perfusion defect in the anterior wall concerning for possible moderate ischemia.  She was seen by my colleague Dr. Ibrahim in consultation 9/26/2006.  It was felt that this could be a false positive stress test given her body habitus and atypical symptoms. Coronary CTA was recommended, for unclear reasons, this had not been done.    At baseline, patient does note dyspnea on exertion when engaging activities such as when climbing stairs.  However, she is able to climb stairs multiple times a day without any significant increase/worsening of symptoms.  Functional capacity limited by knee pain.  She denies any clear chest pain/chest pressure with exertion.    Assessment and Plan/Recommendations:    Patient is a 68-year-old female with a past medical history significant for obesity who presents for further evaluation and management of an abnormal ECG and history of abnormal stress test, in anticipation of left TKA.     Functional capacity limited by knee pain, she does note dyspnea on exertion when climbing stairs which could be an anginal equivalent.  She has a  history of an abnormal nuclear perfusion stress test in 9/2006, study was reviewed personally, and I concur with my colleague Dr. Ibrahim that findings may reflect breast attenuation artifact.  Recommend coronary CTA, pending findings may require cardiac catheterization/coronary angiography and possible intervention.  We discussed that this may require delaying knee surgery.  Patient and  voiced understanding and were in agreement.      Thank you for allowing our team to participate in the care of Rich Terry.  Please do not hesitate to call or page me with any questions or concerns.    Sincerely,     Teodoro Wilson MD, [facility]  Cardiology  March 15, 2023    Total time spent on this encounter: 50 minutes, providing care in this encounter including, but not limited to, reviewing prior medical records, laboratory data, imaging studies, diagnostic studies, procedure notes, formulating an assessment and plan, recommendations, discussion and counseling with patient face to face, dictation.    Past Medical History:     The 10-year ASCVD risk score (Jenny DK, et al., 2019) is: 6.4%    Values used to calculate the score:      Age: 68 years      Sex: Female      Is Non- : No      Diabetic: No      Tobacco smoker: No      Systolic Blood Pressure: 122 mmHg      Is BP treated: No      HDL Cholesterol: 86 mg/dL      Total Cholesterol: 218 mg/dL  Patient Active Problem List   Diagnosis     Generalized osteoarthrosis, unspecified site     Varicose veins of right lower extremity with ulcer of calf limited to breakdown of skin (H)     CARDIOVASCULAR SCREENING; LDL GOAL LESS THAN 160     Elevated BP     Vitamin D deficiency     Adjustment disorder with mixed anxiety and depressed mood     Advanced directives, counseling/discussion     Exposure to toxic chemical     DDD (degenerative disc disease), lumbar     Elevated glucose     Dysmetabolic syndrome X     Venous stasis dermatitis of left  lower extremity     Post-menopausal     Morbid obesity (H)     S/P total knee replacement     Knee pain     Aftercare following right knee joint replacement surgery     AK (actinic keratosis)       Past Surgical History:   Past Surgical History:   Procedure Laterality Date     ARTHROPLASTY KNEE Right 2015    Procedure: ARTHROPLASTY KNEE;  Surgeon: Prasad Valladares MD;  Location: [facility] OR     saphenous vein radiofrequency ablation[  2012     SURGICAL HISTORY OF -       rt LE varicose vein procedure     SURGICAL HISTORY OF -   05    Tibial osteotomy (lt leg)     varicose vein avulsions/stab phlebectomy[  2012     [facility] NONSPECIFIC PROCEDURE      4 knee surgeries (all left knee)     [facility] NONSPECIFIC PROCEDURE      (L) planter fascia surgery       Medications (outpatient):  No current outpatient medications on file.       Allergies:  Allergies   Allergen Reactions     Levaquin Muscle Pain (Myalgia)     Shrimp      throat swells     Sulfa Drugs Rash     rash       Social History:   History   Drug Use No      History   Smoking Status     Former     Packs/day: 1.00     Years: 10.00     Types: Cigarettes     Start date:      Quit date:    Smokeless Tobacco     Never     Social History    Substance and Sexual Activity      Alcohol use: Yes        Comment: 1-2 drinks a month       Family History:  Family History   Problem Relation Age of Onset     Cancer Mother         cervical ca age 36     Heart Disease Mother         A-FIB     Skin Cancer Mother         on nose     Heart Disease Father          CHF age 51     Diabetes Maternal Grandfather      Cancer Maternal Grandmother         uterine ca dx in her 60's     Diabetes Paternal Grandfather      Hypertension Brother      Cancer Maternal Aunt         ovarian ca in her 40's     Diabetes Paternal Uncle         multiple Puncles with DM       Review of Systems:   A comprehensive 12 system review of systems was carried out.  Pertinent positives  "and negatives are noted above. Otherwise negative for contributory information.    Objective & Physical Exam:  /84   Pulse 85   Ht 1.702 m (5' 7\")   Wt 109.5 kg (241 lb 6.4 oz)   LMP 09/11/2005   SpO2 99%   BMI 37.81 kg/m    Wt Readings from Last 2 Encounters:   03/15/23 109.5 kg (241 lb 6.4 oz)   03/06/23 110.6 kg (243 lb 14.4 oz)     Body mass index is 37.81 kg/m .   Body surface area is 2.28 meters squared.    Constitutional: appears stated age, in no apparent distress, appears to be well nourished, Left knee in a brace  Head: normocephalic, atraumatic  Neck: supple, trachea midline  Pulmonary: clear to auscultation bilaterally, no wheezes, no rales, no increased work of breathing  Cardiovascular: JVP normal, regular rate, regular rhythm, normal S1 and S2, no S3, S4, no murmur appreciated  Gastrointestinal: no guarding, non-rigid   Neurologic: awake, alert, moves all extremities  Skin: no jaundice, warm on limited exam  Psychiatric: affect is normal, answers questions appropriately, oriented to self and place    Data reviewed:  Lab Results   Component Value Date    WBC 7.9 02/20/2023    WBC 8.3 05/11/2021    RBC 4.69 02/20/2023    RBC 4.62 05/11/2021    HGB 13.8 02/20/2023    HGB 13.9 05/11/2021    HCT 44.0 02/20/2023    HCT 43.3 05/11/2021    MCV 94 02/20/2023    MCV 94 05/11/2021    MCH 29.4 02/20/2023    MCH 30.1 05/11/2021    MCHC 31.4 (L) 02/20/2023    MCHC 32.1 05/11/2021    RDW 14.7 02/20/2023    RDW 13.4 05/11/2021     02/20/2023     05/11/2021     Sodium   Date Value Ref Range Status   07/13/2021 141 133 - 144 mmol/L Final   06/11/2018 144 133 - 144 mmol/L Final     Potassium   Date Value Ref Range Status   07/13/2021 4.4 3.4 - 5.3 mmol/L Final   06/11/2018 4.7 3.4 - 5.3 mmol/L Final     Chloride   Date Value Ref Range Status   07/13/2021 107 mmol/L Final   06/11/2018 109 94 - 109 mmol/L Final     Carbon Dioxide   Date Value Ref Range Status   06/11/2018 22 20 - 32 mmol/L Final "     Carbon Dioxide (CO2)   Date Value Ref Range Status   07/13/2021 25 20 - 32 mmol/L Final     Anion Gap   Date Value Ref Range Status   07/13/2021 9 3 - 14 mmol/L Final   06/11/2018 13 3 - 14 mmol/L Final     Glucose   Date Value Ref Range Status   07/13/2021 98 70 - 99 mg/dL Final   06/11/2018 96 70 - 99 mg/dL Final     Comment:     Fasting specimen     Urea Nitrogen   Date Value Ref Range Status   07/13/2021 10 7 - 30 mg/dL Final   06/11/2018 10 7 - 30 mg/dL Final     Creatinine   Date Value Ref Range Status   07/13/2021 1.00 mg/dL Final   06/11/2018 0.81 0.52 - 1.04 mg/dL Final     GFR Estimate   Date Value Ref Range Status   07/13/2021 59 (L) >60 mL/min/1.73m2 Final     Comment:     As of July 11, 2021, eGFR is calculated by the CKD-EPI creatinine equation, without race adjustment. eGFR can be influenced by muscle mass, exercise, and diet. The reported eGFR is an estimation only and is only applicable if the renal function is stable.   06/11/2018 71 >60 mL/min/1.7m2 Final     Comment:     Non  GFR Calc     Calcium   Date Value Ref Range Status   07/13/2021 9.6 8.5 - 10.1 mg/dL Final   06/11/2018 9.7 8.5 - 10.1 mg/dL Final     Bilirubin Total   Date Value Ref Range Status   07/13/2021 0.4 0.2 - 1.3 mg/dL Final   06/11/2018 0.3 0.2 - 1.3 mg/dL Final     Alkaline Phosphatase   Date Value Ref Range Status   07/13/2021 128 40 - 150 U/L Final   06/11/2018 112 40 - 150 U/L Final     ALT   Date Value Ref Range Status   07/13/2021 26 U/L Final   06/11/2018 34 0 - 50 U/L Final     AST   Date Value Ref Range Status   07/13/2021 27 0 - 45 U/L Final   06/11/2018 42 0 - 45 U/L Final     Recent Labs   Lab Test 07/13/21  1038 06/11/18  0939 06/02/15  0809   CHOL 218* 205* 228*   HDL 86 90 86   * 99 123   TRIG 92 82 96   CHOLHDLRATIO  --   --  2.7      Lab Results   Component Value Date    A1C 6.0 10/26/2015    A1C 6.2 06/02/2015

## 2023-03-15 NOTE — PATIENT INSTRUCTIONS
March 15, 2023    Thank you for allowing our Cardiology team to participate in your care.     Please note the following changes to your heart treatment plan:     Medication changes:   - none    Tests to be done:  - TTE (heart ultrasound)  - Coronary CTA (take metoprolol tartrate 25mg once on the morning of the CT study)    Follow up:  - Follow up as needed.      For scheduling, please call [phone].    Please contact our team at [phone] (Doris DO) or [phone] for any questions or concerns.     If you are having a medical emergency, please call [phone].     Sincerely,    Teodoro Wilson MD, [facility]  Cardiology    [facility] - [facility] [place]

## 2023-03-17 ENCOUNTER — HOSPITAL ENCOUNTER (OUTPATIENT)
Dept: CARDIOLOGY | Facility: CLINIC | Age: 68
Discharge: HOME | End: 2023-03-17
Attending: INTERNAL MEDICINE | Admitting: INTERNAL MEDICINE
Payer: MEDICARE

## 2023-03-17 VITALS — HEART RATE: 68 BPM | SYSTOLIC BLOOD PRESSURE: 145 MMHG | DIASTOLIC BLOOD PRESSURE: 77 MMHG

## 2023-03-17 DIAGNOSIS — R94.39 ABNORMAL STRESS TEST: ICD-10-CM

## 2023-03-17 PROCEDURE — 250N000011 HC RX IP 250 OP 636: Performed by: INTERNAL MEDICINE

## 2023-03-17 PROCEDURE — 75574 CT ANGIO HRT W/3D IMAGE: CPT | Mod: ME

## 2023-03-17 PROCEDURE — 75574 CT ANGIO HRT W/3D IMAGE: CPT | Mod: 26 | Performed by: INTERNAL MEDICINE

## 2023-03-17 PROCEDURE — G1010 CDSM STANSON: HCPCS | Performed by: INTERNAL MEDICINE

## 2023-03-17 PROCEDURE — 250N000013 HC RX MED GY IP 250 OP 250 PS 637: Performed by: INTERNAL MEDICINE

## 2023-03-17 RX ORDER — DILTIAZEM HCL 60 MG
120 TABLET ORAL
Status: DISCONTINUED | OUTPATIENT
Start: 2023-03-17 | End: 2023-03-18 | Stop reason: HOSPADM

## 2023-03-17 RX ORDER — METOPROLOL TARTRATE 1 MG/ML
5-15 INJECTION, SOLUTION INTRAVENOUS
Status: DISCONTINUED | OUTPATIENT
Start: 2023-03-17 | End: 2023-03-18 | Stop reason: HOSPADM

## 2023-03-17 RX ORDER — DIPHENHYDRAMINE HYDROCHLORIDE 50 MG/ML
25-50 INJECTION INTRAMUSCULAR; INTRAVENOUS
Status: DISCONTINUED | OUTPATIENT
Start: 2023-03-17 | End: 2023-03-18 | Stop reason: HOSPADM

## 2023-03-17 RX ORDER — METHYLPREDNISOLONE SODIUM SUCCINATE 125 MG/2ML
125 INJECTION, POWDER, LYOPHILIZED, FOR SOLUTION INTRAMUSCULAR; INTRAVENOUS
Status: DISCONTINUED | OUTPATIENT
Start: 2023-03-17 | End: 2023-03-18 | Stop reason: HOSPADM

## 2023-03-17 RX ORDER — DIPHENHYDRAMINE HCL 25 MG
25 CAPSULE ORAL
Status: DISCONTINUED | OUTPATIENT
Start: 2023-03-17 | End: 2023-03-18 | Stop reason: HOSPADM

## 2023-03-17 RX ORDER — METOPROLOL TARTRATE 25 MG/1
25-100 TABLET, FILM COATED ORAL
Status: COMPLETED | OUTPATIENT
Start: 2023-03-17 | End: 2023-03-17

## 2023-03-17 RX ORDER — ACYCLOVIR 200 MG/1
0-1 CAPSULE ORAL
Status: DISCONTINUED | OUTPATIENT
Start: 2023-03-17 | End: 2023-03-18 | Stop reason: HOSPADM

## 2023-03-17 RX ORDER — DILTIAZEM HYDROCHLORIDE 5 MG/ML
10-15 INJECTION INTRAVENOUS
Status: DISCONTINUED | OUTPATIENT
Start: 2023-03-17 | End: 2023-03-18 | Stop reason: HOSPADM

## 2023-03-17 RX ORDER — IOPAMIDOL 755 MG/ML
500 INJECTION, SOLUTION INTRAVASCULAR ONCE
Status: COMPLETED | OUTPATIENT
Start: 2023-03-17 | End: 2023-03-17

## 2023-03-17 RX ORDER — ONDANSETRON 2 MG/ML
4 INJECTION INTRAMUSCULAR; INTRAVENOUS
Status: DISCONTINUED | OUTPATIENT
Start: 2023-03-17 | End: 2023-03-18 | Stop reason: HOSPADM

## 2023-03-17 RX ORDER — IVABRADINE 5 MG/1
5-15 TABLET, FILM COATED ORAL
Status: COMPLETED | OUTPATIENT
Start: 2023-03-17 | End: 2023-03-17

## 2023-03-17 RX ORDER — NITROGLYCERIN 0.4 MG/1
0.4 TABLET SUBLINGUAL
Status: DISCONTINUED | OUTPATIENT
Start: 2023-03-17 | End: 2023-03-18 | Stop reason: HOSPADM

## 2023-03-17 RX ADMIN — NITROGLYCERIN 0.4 MG: 0.4 TABLET SUBLINGUAL at 10:23

## 2023-03-17 RX ADMIN — IOPAMIDOL 120 ML: 755 INJECTION, SOLUTION INTRAVENOUS at 10:35

## 2023-03-17 RX ADMIN — METOPROLOL TARTRATE 75 MG: 50 TABLET, FILM COATED ORAL at 08:59

## 2023-03-17 RX ADMIN — IVABRADINE 10 MG: 5 TABLET, FILM COATED ORAL at 08:59

## 2023-03-20 NOTE — RESULT ENCOUNTER NOTE
Results reviewed, please let the patient know that the Radiologist read noted a small 4mm nodule int he right lung, recommend she follow up with her PCP on this thanks!

## 2023-03-20 NOTE — RESULT ENCOUNTER NOTE
Results reviewed, please let the patient know that overall findings are reassuring, mild non-obstructive CAD. Recommend starting aspirin 81mg daily, please have her see an JUDY at next available to discuss recommendation to start rosuvastatin 20mg at bedtime, thanks!

## 2023-03-21 ENCOUNTER — TELEPHONE (OUTPATIENT)
Dept: CARDIOLOGY | Facility: CLINIC | Age: 68
End: 2023-03-21
Payer: MEDICARE

## 2023-03-21 DIAGNOSIS — I25.10 CAD (CORONARY ARTERY DISEASE): Primary | ICD-10-CM

## 2023-03-21 NOTE — TELEPHONE ENCOUNTER
Call placed to pt to review results and recommendations:     IMPRESSION:  1.  Mild nonobstructive coronary artery disease.  2.  Total Agatston score 302 placing the patient in the 89th  percentile when compared to age and gender matched control group.  3.  Please review Radiology report for incidental noncardiac findings  that will follow separately.      ----- Message from Teodoro Wilson MD sent at 3/20/2023  8:06 AM CDT -----  Results reviewed, please let the patient know that overall findings are reassuring, mild non-obstructive CAD. Recommend starting aspirin 81mg daily, please have her see an JUDY at next available to discuss recommendation to start rosuvastatin 20mg at bedtime, thanks!     Radiology consult:   FINDINGS: Please note this report will focus only on soft tissue  findings. Please see separate cardiology report for all cardiac and  vascular findings.     No abnormally enlarged mediastinal lymph nodes. Mild hiatal hernia.  Visible solid organs of the upper abdomen are otherwise unremarkable.  Linear opacity in the right lung, likely scar formation. Small nodule  in the right lung is 4 mm. Pending patient's risk factors, this may  not require follow-up given small size. At most, may consider  follow-up CT examination in 6-12 months.    Teodoro Wilson MD Porter, Sarah, RN  Results reviewed, please let the patient know that the Radiologist read noted a small 4mm nodule int he right lung, recommend she follow up with her PCP on this thanks!       Pt verbalized understanding of results and recommendations. Pt will start ASA post surgical as she is awaiting knee repair at this time. Pt has TTE 3/27/203 and will follow up after results received. Pt would like to await surgery to follow up regarding statin therapy which is acceptable.   CALVIN Schafer RN, BSN. 03/21/23 2:02 PM

## 2023-03-27 ENCOUNTER — HOSPITAL ENCOUNTER (OUTPATIENT)
Dept: CARDIOLOGY | Facility: CLINIC | Age: 68
Discharge: HOME | End: 2023-03-27
Attending: INTERNAL MEDICINE | Admitting: INTERNAL MEDICINE
Payer: MEDICARE

## 2023-03-27 DIAGNOSIS — R06.09 DOE (DYSPNEA ON EXERTION): ICD-10-CM

## 2023-03-27 DIAGNOSIS — R94.39 ABNORMAL STRESS TEST: ICD-10-CM

## 2023-03-27 LAB — LVEF ECHO: NORMAL

## 2023-03-27 PROCEDURE — 93306 TTE W/DOPPLER COMPLETE: CPT | Mod: 26 | Performed by: INTERNAL MEDICINE

## 2023-03-27 PROCEDURE — 93306 TTE W/DOPPLER COMPLETE: CPT

## 2023-03-29 ENCOUNTER — TELEPHONE (OUTPATIENT)
Dept: CARDIOLOGY | Facility: CLINIC | Age: 68
End: 2023-03-29
Payer: MEDICARE

## 2023-03-29 ENCOUNTER — TELEPHONE (OUTPATIENT)
Dept: FAMILY MEDICINE | Facility: CLINIC | Age: 68
End: 2023-03-29
Payer: MEDICARE

## 2023-03-29 NOTE — TELEPHONE ENCOUNTER
[facility] Call Center    Phone Message    May a detailed message be left on voicemail: yes     Reason for Call: Other: Pt spouse called in looking for a folow up from care team about echo as they needed this to be able to have knee replaced. Please follow up with Pt spouse     Action Taken: Other: cardio    Travel Screening: Not Applicable     Thank you!  [facility]

## 2023-03-29 NOTE — TELEPHONE ENCOUNTER
Call placed to pt to review results and recommendations from Dr. Wilson:     CTA results:   IMPRESSION:  1.  Mild nonobstructive coronary artery disease.  2.  Total Agatston score 302 placing the patient in the 89th  percentile when compared to age and gender matched control group.  3.  Please review Radiology report for incidental noncardiac findings  that will follow separately.     FINDINGS:     CORONARY CALCIUM SCORE     Total Agatston calcium score: 302   Left main: 0  Left anterior descendin.7  Left circumflex: 187  Right coronary artery: 32   This places the patient in the 89th percentile when compared to age  and gender matched control group.      Echocardiogram results:  Interpretation Summary     The visual ejection fraction is 55-60%.  Left ventricular systolic function is normal.  The ascending aorta is Mildly dilated.  Contrast would enhance image quality and regional wall motion analysis. The  study was technically difficult.     Teodoro Wilson MD   3/29/2023  7:28 AM CDT       Results reviewed, please let the patient know that overall findings are reassuring, normal cardiac structure and function.   We also checked a coronary CTA which showed non-obstructive CAD.  We recommended ASA and statin, and JUDY follow up to discuss further (not sure if this has been scheduled yet?)-- I note that she would like to hold off on statin until the follow up visit after the knee surgery which is fine.     She may proceed with the knee surgery without further cardiac diagnostic testing. Please send this note and my clinic note to her surgery team if needed, thanks!      Per Dr. Wilson pt ok to proceed with procedure without further diagnostics or intervention.   Will send information to the following Office:    [facility]   Dr. Prasad Valladares   (p) [phone]  (f) [phone]  CALVIN Schafer RN, BSN. 23 11:58 AM

## 2023-03-29 NOTE — LETTER
March 29, 2023       TO: Rich Terry   [address] [place] [facility] [place] [address]       To Whom it May Concern,      Rich Terry is under the care of the [facility] with Dr. Teodoro Wilson. Patient May proceed with planned surgery without further cardiac diagnostic testings. Please reach out to our office with any questions or concerns.       Sincerely,    Teodoro Wilson MD, [facility]  Cardiology

## 2023-03-29 NOTE — TELEPHONE ENCOUNTER
Rich is calling stating that during her pre-op on 3/6 with Dr. Vasques, that there was a concerning result on her EKG, was than referred to cardiology that cleared her. Notes in Epic. Rich has spoken with her surgeons office and her new DOS is 4/17 and her surgeon is ok with an addendum to the 3/6 pre-op to clear her for surgery. Please review cardiology notes and addend 3/6 visit.     Rich- [phone], ok to leave detailed message    Yumiko Harkins

## 2023-03-29 NOTE — RESULT ENCOUNTER NOTE
Results reviewed, please let the patient know that overall findings are reassuring, normal cardiac structure and function.   We also checked a coronary CTA which showed non-obstructive CAD.  We recommended ASA and statin, and JUDY follow up to discuss further (not sure if this has been scheduled yet?)-- I note that she would like to hold off on statin until the follow up visit after the knee surgery which is fine.    She may proceed with the knee surgery without further cardiac diagnostic testing. Please send this note and my clinic note to her surgery team if needed, thanks!

## 2023-04-05 RX ORDER — ASPIRIN 81 MG/1
81 TABLET, CHEWABLE ORAL DAILY
Status: ON HOLD | COMMUNITY
End: 2023-05-08

## 2023-04-05 NOTE — TELEPHONE ENCOUNTER
PCP is out of office; she will need to address when she returns to clinic.     Brittny Fraire CNP

## 2023-04-16 NOTE — TELEPHONE ENCOUNTER
Provided addendum to 3/6/2023 OV. Now that it has been over 60 days, unclear if she needs updated Preop per Medicare guidelines.

## 2023-04-17 NOTE — TELEPHONE ENCOUNTER
Called pt.  Surgery now scheduled for 5/8/23     Cardiologist cleared pt for surgery    Dr Eli's assistant, Miriam, said if you post dated pre-op that would be fine.  OR nurse who called to go through pre-op instructions agreed.  Pt is wondering if you are ok with this or if you would like pt to reschedule.    Called pt back to inform Dr Vasques said she cannot post date the pre-op.  Advised pt to call Medicare and check on if needs new pre-op and to check coverage of second pre-op due to date change.  Pt verbalized understanding and agreed to plan.      Pre-op scheduled 5/4/23 w/ Dr Vasques.      Suzy Vieira RN, BSN  [facility]

## 2023-05-04 ENCOUNTER — OFFICE VISIT (OUTPATIENT)
Dept: FAMILY MEDICINE | Facility: CLINIC | Age: 68
End: 2023-05-04
Payer: MEDICARE

## 2023-05-04 VITALS
SYSTOLIC BLOOD PRESSURE: 132 MMHG | DIASTOLIC BLOOD PRESSURE: 78 MMHG | BODY MASS INDEX: 38.28 KG/M2 | HEART RATE: 75 BPM | OXYGEN SATURATION: 97 % | RESPIRATION RATE: 17 BRPM | HEIGHT: 67 IN | WEIGHT: 243.9 LBS | TEMPERATURE: 98.7 F

## 2023-05-04 DIAGNOSIS — E55.9 VITAMIN D DEFICIENCY: ICD-10-CM

## 2023-05-04 DIAGNOSIS — Z01.818 PREOP GENERAL PHYSICAL EXAM: Primary | ICD-10-CM

## 2023-05-04 DIAGNOSIS — M17.12 PRIMARY OSTEOARTHRITIS OF LEFT KNEE: ICD-10-CM

## 2023-05-04 DIAGNOSIS — Z12.11 SPECIAL SCREENING FOR MALIGNANT NEOPLASMS, COLON: ICD-10-CM

## 2023-05-04 DIAGNOSIS — I45.2 RBBB (RIGHT BUNDLE BRANCH BLOCK WITH LEFT ANTERIOR FASCICULAR BLOCK): ICD-10-CM

## 2023-05-04 DIAGNOSIS — E66.01 MORBID OBESITY (H): ICD-10-CM

## 2023-05-04 PROCEDURE — 99215 OFFICE O/P EST HI 40 MIN: CPT | Performed by: INTERNAL MEDICINE

## 2023-05-04 ASSESSMENT — PAIN SCALES - GENERAL: PAINLEVEL: MODERATE PAIN (5)

## 2023-05-04 NOTE — PROGRESS NOTES
"[facility]  [address] [facility] [address]  Phone: [phone]  Primary Provider: Ragini Stevens  Pre-op Performing Provider: AMBER CARRION      PREOPERATIVE EVALUATION:  Today's date: 5/4/2023    Rich Terry is a 68 year old female who presents for a preoperative evaluation.      5/4/2023    10:43 AM   Additional Questions   Roomed by Faith RAYGOZA LPN     Surgical Information:  Surgery/Procedure: Left total knee arthroplasty poly ethylene spacer exchange  Surgery Location: [facility]   Surgeon: Prasad Valladares MD  Surgery Date: 5/8/2023  Time of Surgery: 7:20 AM  Where patient plans to recover: At home with family  Spouse is recovering from rotator cuff surgery.  Fax number for surgical facility: Note does not need to be faxed, will be available electronically in Epic.    Assessment & Plan     The proposed surgical procedure is considered INTERMEDIATE risk.    (Z01.818) Preop general physical exam  (primary encounter diagnosis)  Comment: hx of Left TKA; mechanical hardware malfunction; per patient, needs to be repaired or replaced if unable to fix.    Plan: surgery    (E55.9) Vitamin D deficiency  Comment: TKA - repair or redo on the left  Plan: encourage adequate Vit D supplementation.     (M17.12) Primary osteoarthritis of left knee  Comment: Left TKA originally done 2007; \"something broke\"; she is wearing immobilizer; limiting activity.\" R TKA 2016  Plan:     (E66.01) Morbid obesity (H)  Comment: Body mass index is 38.2 kg/m .   Plan: encourage regular exercise and heathy diet;  Surgery involvinge left TKA- repair or redo in order to help improve mobility and may aid in weight loss over time..    (I45.2) RBBB (right bundle branch block with left anterior fascicular block)  Comment: pt has seen cardiology and no further intervention needed. consider statin therapy; pt prefers to wait at this time.  Reviewed ECHO and " "CTA  Plan: cardiology evaluation with Dr. Teodoro Wilson reviewed; and felt to be a satisfactory candidate for surgery.    (Z12.11) Special screening for malignant neoplasms, colon  Comment: offered pt referral or home stool test at March Preop and again today;  Pt ADAMANTLY DECLINED; \"I JUST WANT TO GET MY KNEE FIXED\"  Plan: encourage pt to consider Cologuard colon cancer screening assessment;            RECOMMENDATIONS:    --Approval given to proceed with proposed procedure, without further diagnostic evaluation  --Pt advised to avoid NSAIDS  one week prior to surgery.(Motrin, Ibuprofen, Aleve or Naprosyn);  If needed, Tylenol or Acetaminophen are fine to use.  --meds reviewed; may hold all meds on AM of surgery.   --Pain medications, time off from work and FMLA following surgery deferred to surgeon.  --RESUME ASA after surgery.  --CONSIDER follow up with PCP for Preventative visit, colon screening and the possibility of cholesterol medications.       Karey Vasques MD  Internal Medicine  electronically signed     45 minutes spent by me on the date of the encounter doing chart review, history and exam, documentation and further activities per the note              Subjective     HPI related to upcoming procedure: Rich Terry is a 68 year old female who presents for preoperative evaluation. She had left TKA initial done in 2007; recently she has had increased pain and it is now felt the L TKA hardware needs to be repaired or replaced. She has ongoing pain.   3/6/2023 Preop was reviewed; in the interim she has has seen cardiology due to new finding of RBBB and concern about cardiac status; she has met with cardiology, Dr Teodoro Wilson, had ECHO and CTA completed without evidence of significant cardiovascular disease.  Cardiology has not recommended any further diagnostic testing prior to surgery.         5/4/2023     9:39 AM   Preop Questions   1. Have you ever had a heart attack or stroke? No   2. Have you ever had " surgery on your heart or blood vessels, such as a stent placement, a coronary artery bypass, or surgery on an artery in your head, neck, heart, or legs? No   3. Do you have chest pain with activity? No   4. Do you have a history of  heart failure? No   5. Do you currently have a cold, bronchitis or symptoms of other infection? No   6. Do you have a cough, shortness of breath, or wheezing? No   7. Do you or anyone in your family have previous history of blood clots? YES - person hx  Has had superficial clots in both legs    8. Do you or does anyone in your family have a serious bleeding problem such as prolonged bleeding following surgeries or cuts? No   9. Have you ever had problems with anemia or been told to take iron pills? No   10. Have you had any abnormal blood loss such as black, tarry or bloody stools, or abnormal vaginal bleeding? No   11. Have you ever had a blood transfusion? No   12. Are you willing to have a blood transfusion if it is medically needed before, during, or after your surgery? Yes   13. Have you or any of your relatives ever had problems with anesthesia? No   14. Do you have sleep apnea, excessive snoring or daytime drowsiness? No   15. Do you have any artifical heart valves or other implanted medical devices like a pacemaker, defibrillator, or continuous glucose monitor? No   16. Do you have artificial joints? YES - bilateral knee   17. Are you allergic to latex? No       Health Care Directive:  Patient does not have a Health Care Directive or Living Will: Discussed advance care planning with patient; however, patient declined at this time.    Preoperative Review of :   reviewed - no record of controlled substances prescribed.        Review of Systems  CONSTITUTIONAL: NEGATIVE for fever, chills, change in weight  INTEGUMENTARY/SKIN: NEGATIVE for worrisome rashes, moles or lesions  EYES: NEGATIVE for vision changes or irritation  ENT/MOUTH: NEGATIVE for ear, mouth and throat  problems  RESP: NEGATIVE for significant cough or SOB  CV: RBBB noted; recent cardiology evaluation with ECHO and CTA; no worrisome findings  GI: NEGATIVE for nausea, abdominal pain, heartburn, or change in bowel habits  : NEGATIVE for frequency, dysuria, or hematuria  MUSCULOSKELETAL:LTKA originally done 2007; she has since been found to have fracture of TKA compnents and it is unclear if it can be repaired of if a TKA subsequite REPAIR / REDO can be done due to increased pain;  NEURO: NEGATIVE for weakness, dizziness or paresthesias  ENDOCRINE: NEGATIVE for temperature intolerance, skin/hair changes  HEME: NEGATIVE for bleeding problems  PSYCHIATRIC: NEGATIVE for changes in mood or affect    Patient Active Problem List    Diagnosis Date Noted     AK (actinic keratosis) 06/11/2018     Priority: Medium     Knee pain 11/10/2015     Priority: Medium     Aftercare following right knee joint replacement surgery 11/10/2015     Priority: Medium     S/P total knee replacement 11/02/2015     Priority: Medium     Morbid obesity (H) 10/26/2015     Priority: Medium     Venous stasis dermatitis of left lower extremity 08/04/2015     Priority: Medium     Post-menopausal 08/04/2015     Priority: Medium     Elevated glucose 06/02/2015     Priority: Medium     Dysmetabolic syndrome X 06/02/2015     Priority: Medium     DDD (degenerative disc disease), lumbar 04/19/2013     Priority: Medium     Exposure to toxic chemical 03/15/2013     Priority: Medium     Do you wish to do the replacement in the background? yes         Advanced directives, counseling/discussion 08/28/2012     Priority: Medium     Adjustment disorder with mixed anxiety and depressed mood 08/07/2012     Priority: Medium     Vitamin D deficiency 08/03/2012     Priority: Medium     (Problem list name updated by automated process. Provider to review and confirm.)       Elevated BP 06/05/2012     Priority: Medium     CARDIOVASCULAR SCREENING; LDL GOAL LESS THAN 160  02/15/2011     Priority: Medium              Varicose veins of right lower extremity with ulcer of calf limited to breakdown of skin (H)      Priority: Medium     Generalized osteoarthrosis, unspecified site 2003     Priority: Medium      Past Medical History:   Diagnosis Date     Generalized osteoarthrosis, unspecified site      Pain in right knee      Phlebitis and thrombophlebitis of superficial vessels of lower extremities     several episodes     Thrombosis of leg     superficial, not deep     Varicose veins of lower extremities with ulcer (H)      Past Surgical History:   Procedure Laterality Date     ARTHROPLASTY KNEE Right 2015    Procedure: ARTHROPLASTY KNEE;  Surgeon: Prasad Valladares MD;  Location: [facility] OR     saphenous vein radiofrequency ablation[  2012     SURGICAL HISTORY OF -       rt LE varicose vein procedure     SURGICAL HISTORY OF -   05    Tibial osteotomy (lt leg)     varicose vein avulsions/stab phlebectomy[  2012     [facility] NONSPECIFIC PROCEDURE      4 knee surgeries (all left knee)     [facility] NONSPECIFIC PROCEDURE      (L) planter fascia surgery     Current Outpatient Medications   Medication Sig Dispense Refill     VITAMIN D PO Take by mouth daily       aspirin (ASA) 81 MG chewable tablet Take 81 mg by mouth daily         Allergies   Allergen Reactions     Levaquin Muscle Pain (Myalgia)     Shrimp      throat swells     Sulfa Antibiotics Rash     rash        Social History     Tobacco Use     Smoking status: Former     Packs/day: 1.00     Years: 10.00     Pack years: 10.00     Types: Cigarettes     Start date:      Quit date:      Years since quittin.3     Smokeless tobacco: Never   Vaping Use     Vaping status: Never Used     Passive vaping exposure: Yes   Substance Use Topics     Alcohol use: Yes     Comment: 1-2 drinks a month     Family History   Problem Relation Age of Onset     Cancer Mother         cervical ca age 36     Heart Disease Mother   "       A-FIB     Skin Cancer Mother         on nose     Heart Disease Father          CHF age 51     Diabetes Maternal Grandfather      Cancer Maternal Grandmother         uterine ca dx in her 60's     Diabetes Paternal Grandfather      Hypertension Brother      Cancer Maternal Aunt         ovarian ca in her 40's     Diabetes Paternal Uncle         multiple Puncles with DM     History   Drug Use No         Objective     /78   Pulse 75   Temp 98.7  F (37.1  C) (Oral)   Resp 17   Ht 1.702 m (5' 7\")   Wt 110.6 kg (243 lb 14.4 oz)   LMP 2005   SpO2 97%   BMI 38.20 kg/m      Physical Exam    GENERAL APPEARANCE: healthy, alert and no distress     HENT: ear canals and TM's normal and nose and mouth without ulcers or lesions     NECK: no adenopathy, no asymmetry, masses, or scars and thyroid normal to palpation     RESP: lungs clear to auscultation - no rales, rhonchi or wheezes     CV: regular rates and rhythm, normal S1 S2, no S3 or S4 and no edema     ABDOMEN:  soft, nontender, no HSM or masses and bowel sounds normal     MS: left leg immobilized; ambulatory with immobilizer in place. No edema;     SKIN: no suspicious lesions or rashes     NEURO: Normal strength and tone, sensory exam grossly normal, mentation intact and speech normal     PSYCH: mentation appears normal. and affect normal/bright    Recent Labs   Lab Test 23  0828 21  1038   HGB 13.8 14.0    347   NA  --  141   POTASSIUM  --  4.4   CR  --  1.00        Diagnostics:  ECHO  Echocardiogram Complete  Order: [id number]   Status: Edited Result - FINAL      Visible to patient: Yes (not seen)      Next appt: None      Dx: THOMAS (dyspnea on exertion); Abnormal s...      3 Result Notes     1 Patient Communication  Details    Reading Physician Reading Date Result Priority   Lopez Monique MD  [phone] 3/27/2023      Narrative & Impression  [id number]  [id number]  [id number]  [id number]^RICHIE^VINH^BRENDA     [facility] " [facility]  Echocardiography Laboratory  [address] [place] [address]     Name: RIYA JENSEN  MRN: [id number]  : 1955  Study Date: 2023 12:27 PM  Age: 68 yrs  Gender: Female  Patient Location: [facility]  Reason For Study: THOMAS (dyspnea on exertion), Abnormal stress test  Ordering Physician: VINH QUIROZ  Referring Physician: VINH QUIROZ  Performed By: Kerri Callaway     BSA: 2.2 m2  Height: 67 in  Weight: 240 lb  HR: 68  BP: 144/91 mmHg  ______________________________________________________________________________  Procedure  Complete Echo Adult. Adequate quality two-dimensional was performed and  interpreted.  ______________________________________________________________________________  Interpretation Summary     The visual ejection fraction is 55-60%.  Left ventricular systolic function is normal.  The ascending aorta is Mildly dilated.  Contrast would enhance image quality and regional wall motion analysis. The  study was technically difficult.  ______________________________________________________________________________  Left Ventricle  The left ventricle is normal in size. There is normal left ventricular wall  thickness. The visual ejection fraction is 55-60%. Left ventricular systolic  function is normal. Grade I or early diastolic dysfunction.     Right Ventricle  The right ventricle is normal in size and function.     Atria  Normal left atrial size. Right atrial size is normal. There is no color  Doppler evidence of an atrial shunt.     Mitral Valve  The mitral valve leaflets are mildly thickened. There is trace mitral  regurgitation.     Tricuspid Valve  There is trace tricuspid regurgitation. Right ventricular systolic pressure  could not be approximated due to inadequate tricuspid regurgitation.     Aortic Valve  The aortic valve is trileaflet. There is mild trileaflet aortic sclerosis. No  aortic regurgitation is present. No hemodynamically significant valvular  aortic  stenosis.     Pulmonic Valve  There is trace pulmonic valvular regurgitation.     Vessels  The aortic root is normal size. The ascending aorta is Mildly dilated. IVC  diameter <2.1 cm collapsing >50% with sniff suggests a normal RA pressure of 3  mmHg.     Pericardium  There is no pericardial effusion.     Rhythm  The rhythm was sinus bradycardia.  ______________________________________________________________________________  MMode/2D Measurements & Calculations  IVSd: 1.1 cm     LVIDd: 5.0 cm  LVIDs: 3.2 cm  LVPWd: 1.0 cm  FS: 35.1 %  LV mass(C)d: 198.1 grams  LV mass(C)dI: 90.7 grams/m2  Ao root diam: 3.3 cm  asc Aorta Diam: 3.6 cm  LVOT diam: 2.1 cm  LVOT area: 3.3 cm2  RWT: 0.41  TAPSE: 1.7 cm     Doppler Measurements & Calculations  MV E max jonny: 47.4 cm/sec  MV A max jonny: 74.6 cm/sec  MV E/A: 0.64  MV max P.3 mmHg  MV mean P.72 mmHg  MV V2 VTI: 20.8 cm  MVA(VTI): 3.5 cm2  MV dec time: 0.36 sec  Ao V2 max: 129.0 cm/sec  Ao max P.0 mmHg  Ao V2 mean: 87.3 cm/sec  Ao mean P.0 mmHg  Ao V2 VTI: 29.3 cm  WILMAN(I,D): 2.5 cm2  WILMAN(V,D): 2.3 cm2  LV V1 max PG: 3.3 mmHg  LV V1 max: 90.6 cm/sec  LV V1 VTI: 21.8 cm  SV(LVOT): 72.9 ml  SI(LVOT): 33.4 ml/m2     PA acc time: 0.11 sec  AV Jonny Ratio (DI): 0.70  WILMAN Index (cm2/m2): 1.1  E/E' av.6  Lateral E/e': 5.3  Medial E/e': 9.9  RV S Jonny: 8.8 cm/sec     ______________________________________________________________________________  Report approved by: Lyn Davis 2023 03:33 PM               Component 1 mo ago   LVEF  55-60%    Resulting Agency [facility]              Specimen Collected: 23 12:27 PM Last Resulted: 23 12:27 PM                  CTA  CTA Angiogram coronary artery  Order# [id number]  Reading physician: Toni Walters MD Ordering physician: Teodoro Wilson MD Study date: 2023     Patient Information    Patient Name   Rich Terry MRN   [id number] Legal Sex   Female              Age   1955 (68  year old)     Reason for Exam  Priority: Routine  abnormal stress test with possible moderate anterior ischemia in    Dx: Abnormal stress test [R94.39 (ICD-10-CM)]     Indications    Abnormal stress test [R94.39 (ICD-10-CM)]     Interpretation Summary    Procedure: CTA ANGIOGRAM CORONARY ARTERY   Examination Date: 3/17/2023 12:32 PM   Indication: abnormal stress test with possible moderate anterior  ischemia in ; Abnormal stress test   Ordering Provider: VINH QUIROZ  Overall quality of the study: Good.      PROCEDURE: After obtaining informed consent,  ECG gated multi-slice  computed tomography of the heart  with and without intravenous  contrast  (Isovue 370, 120 mL, wasted 0 mL) was  performed on a  Siemens Dual Source Flash scanner without incident. Beta-blockers were  used to optimize heart rate (Metoprolol 75 mg Oral/ Metoprolol 10 mg  IV). Sublingual Nitrostat 0.4 mg was given prior to scanning. Coronary  artery calcium score was performed using the Flash scanner protocol.  CTA was performed in the spiral mode at a heart rate of 52 bpm with  120 kVp. Images were reconstructed and analyzed on a Open[employer]  workstation. Scan protocol was optimized to minimize radiation  exposure. The total radiation exposure including calcium score was  calculated to be 473 DLP, and 6.62 mSv.                                                                        IMPRESSION:  1.  Mild nonobstructive coronary artery disease.  2.  Total Agatston score 302 placing the patient in the 89th  percentile when compared to age and gender matched control group.  3.  Please review Radiology report for incidental noncardiac findings  that will follow separately.     FINDINGS:     CORONARY CALCIUM SCORE     Total Agatston calcium score: 302   Left main: 0  Left anterior descendin.7  Left circumflex: 187  Right coronary artery: 32   This places the patient in the 89th percentile when compared to age  and gender matched control  group.     CORONARY ANGIOGRAPHY     DOMINANCE: Right dominant system.   Normal coronary origins and course.     LEFT MAIN:   The left main arises normally from the left coronary cusp and is  widely patent without any detectable stenosis.      LEFT ANTERIOR DESCENDING:   The proximal left anterior descending demonstrates a segment of  calcified plaque with associated mild (25-49%) stenosis. The mid left  anterior descending  demonstrates a segment of calcified plaque with  associated minimal (<25%) stenosis. The remainder of this vessel and  the major diagonal branch appear patent without significant plaque or  stenosis.         LEFT CIRCUMFLEX:   The mid circumflex demonstrates a segment of calcified plaque with  associated mild (25-49%) stenosis. The remainder of this vessel  appears patent without any significant plaque or stenosis.        RIGHT CORONARY ARTERY:   The mid right coronary artery demonstrates a segment of calcified  plaque with associated minimal (<25%) stenosis. The remainder of this  vessel appears patent without any significant plaque or stenosis.        ADDITIONAL FINDINGS:      The proximal ascending aorta is normal in size.   Normal pulmonary venous anatomy with all four pulmonary veins draining  into the left atrium.    There is no left ventricular mass or thrombus.   Normal pericardial thickness. There is no pericardial effusion.  Please review Radiology report for incidental noncardiac findings that  will follow separately.     ALICIA JUSTIN MD         SYSTEM ID:  [id number]           EKG: appears normal, NSR, Right Bundle Branch Block, EKG done on 3/6/2023 which lead to cardiology follow up.     Revised Cardiac Risk Index (RCRI):  The patient has the following serious cardiovascular risks for perioperative complications:   - No serious cardiac risks = 0 points     RCRI Interpretation: 0 points: Class I (very low risk - 0.4% complication rate)           Signed Electronically by: Karey Quinones  MD [place]  Copy of this evaluation report is provided to requesting physician.

## 2023-05-04 NOTE — PATIENT INSTRUCTIONS
For informational purposes only. Not to replace the advice of your health care provider. Copyright   2003,  [employer] [facility]. All rights reserved. Clinically reviewed by Lynne Diana MD. [employer] [id number] - REV .  Preparing for Your Surgery  Getting started  A nurse will call you to review your health history and instructions. They will give you an arrival time based on your scheduled surgery time. Please be ready to share:  Your doctor's clinic name and phone number  Your medical, surgical, and anesthesia history  A list of allergies and sensitivities  A list of medicines, including herbal treatments and over-the-counter drugs  Whether the patient has a legal guardian (ask how to send us the papers in advance)  Please tell us if you're pregnant--or if there's any chance you might be pregnant. Some surgeries may injure a fetus (unborn baby), so they require a pregnancy test. Surgeries that are safe for a fetus don't always need a test, and you can choose whether to have one.   If you have a child who's having surgery, please ask for a copy of Preparing for Your Child's Surgery.    Preparing for surgery  Within 10 to 30 days of surgery: Have a pre-op exam (sometimes called an H&P, or History and Physical). This can be done at a clinic or pre-operative center.  If you're having a , you may not need this exam. Talk to your care team.  At your pre-op exam, talk to your care team about all medicines you take. If you need to stop any medicines before surgery, ask when to start taking them again.  We do this for your safety. Many medicines can make you bleed too much during surgery. Some change how well surgery (anesthesia) drugs work.  Call your insurance company to let them know you're having surgery. (If you don't have insurance, call [phone].)  Call your clinic if there's any change in your health. This includes signs of a cold or flu (sore throat, runny nose, cough, rash, fever). It  also includes a scrape or scratch near the surgery site.  If you have questions on the day of surgery, call your hospital or surgery center.  Eating and drinking guidelines  For your safety: Unless your surgeon tells you otherwise, follow the guidelines below.  Eat and drink as usual until 8 hours before you arrive for surgery. After that, no food or milk.  Drink clear liquids until 2 hours before you arrive. These are liquids you can see through, like water, Gatorade, and Propel Water. They also include plain black coffee and tea (no cream or milk), candy, and breath mints. You can spit out gum when you arrive.  If you drink alcohol: Stop drinking it the night before surgery.  If your care team tells you to take medicine on the morning of surgery, it's okay to take it with a sip of water.  Preventing infection  Shower or bathe the night before and morning of your surgery. Follow the instructions your clinic gave you. (If no instructions, use regular soap.)  Don't shave or clip hair near your surgery site. We'll remove the hair if needed.  Don't smoke or vape the morning of surgery. You may chew nicotine gum up to 2 hours before surgery. A nicotine patch is okay.  Note: Some surgeries require you to completely quit smoking and nicotine. Check with your surgeon.  Your care team will make every effort to keep you safe from infection. We will:  Clean our hands often with soap and water (or an alcohol-based hand rub).  Clean the skin at your surgery site with a special soap that kills germs.  Give you a special gown to keep you warm. (Cold raises the risk of infection.)  Wear special hair covers, masks, gowns and gloves during surgery.  Give antibiotic medicine, if prescribed. Not all surgeries need antibiotics.  What to bring on the day of surgery  Photo ID and insurance card  Copy of your health care directive, if you have one  Glasses and hearing aids (bring cases)  You can't wear contacts during surgery  Inhaler and  eye drops, if you use them (tell us about these when you arrive)  CPAP machine or breathing device, if you use them  A few personal items, if spending the night  If you have . . .  A pacemaker, ICD (cardiac defibrillator) or other implant: Bring the ID card.  An implanted stimulator: Bring the remote control.  A legal guardian: Bring a copy of the certified (court-stamped) guardianship papers.  Please remove any jewelry, including body piercings. Leave jewelry and other valuables at home.  If you're going home the day of surgery  You must have a responsible adult drive you home. They should stay with you overnight as well.  If you don't have someone to stay with you, and you aren't safe to go home alone, we may keep you overnight. Insurance often won't pay for this.  After surgery  If it's hard to control your pain or you need more pain medicine, please call your surgeon's office.  Questions?   If you have any questions for your care team, list them here: _________________________________________________________________________________________________________________________________________________________________________ ____________________________________ ____________________________________ ____________________________________        RECOMMENDATIONS:    --Approval given to proceed with proposed procedure, without further diagnostic evaluation  --Pt advised to avoid NSAIDS  one week prior to surgery.(Motrin, Ibuprofen, Aleve or Naprosyn);  If needed, Tylenol or Acetaminophen are fine to use.  --meds reviewed; may hold all meds on AM of surgery.   --Pain medications, time off from work and FMLA following surgery deferred to surgeon.  --RESUME ASA after surgery.  --CONSIDER follow up with PCP for Preventative visit, colon screening and the possibility of cholesterol medications.

## 2023-05-05 NOTE — PHARMACY-ADMISSION MEDICATION HISTORY
Admission Medication History      Nurse Aissatou Méndez RN Wed Apr 5, 2023  9:23 AM       Medication Sig Taking?   aspirin (ASA) 81 MG chewable tablet Take 81 mg by mouth daily Yes   VITAMIN D PO Take by mouth daily Yes

## 2023-05-08 ENCOUNTER — ANESTHESIA (OUTPATIENT)
Dept: SURGERY | Facility: CLINIC | Age: 68
DRG: 465 | End: 2023-05-08
Payer: MEDICARE

## 2023-05-08 ENCOUNTER — ANESTHESIA EVENT (OUTPATIENT)
Dept: SURGERY | Facility: CLINIC | Age: 68
DRG: 465 | End: 2023-05-08
Payer: MEDICARE

## 2023-05-08 ENCOUNTER — APPOINTMENT (OUTPATIENT)
Dept: PHYSICAL THERAPY | Facility: CLINIC | Age: 68
DRG: 465 | End: 2023-05-08
Attending: ORTHOPAEDIC SURGERY
Payer: MEDICARE

## 2023-05-08 ENCOUNTER — HOSPITAL ENCOUNTER (INPATIENT)
Facility: CLINIC | Age: 68
LOS: 1 days | Discharge: HOME | DRG: 465 | End: 2023-05-09
Attending: ORTHOPAEDIC SURGERY | Admitting: ORTHOPAEDIC SURGERY
Payer: MEDICARE

## 2023-05-08 ENCOUNTER — APPOINTMENT (OUTPATIENT)
Dept: GENERAL RADIOLOGY | Facility: CLINIC | Age: 68
DRG: 465 | End: 2023-05-08
Attending: ORTHOPAEDIC SURGERY
Payer: MEDICARE

## 2023-05-08 DIAGNOSIS — Z96.652 S/P REVISION OF TOTAL KNEE, LEFT: Primary | ICD-10-CM

## 2023-05-08 PROBLEM — Z96.659 S/P REVISION OF TOTAL KNEE: Status: ACTIVE | Noted: 2023-05-08

## 2023-05-08 LAB
CREAT SERPL-MCNC: 0.73 MG/DL (ref 0.51–0.95)
CREAT SERPL-MCNC: 0.82 MG/DL (ref 0.51–0.95)
GFR SERPL CREATININE-BSD FRML MDRD: 77 ML/MIN/1.73M2
GFR SERPL CREATININE-BSD FRML MDRD: 89 ML/MIN/1.73M2
GRAM STAIN RESULT: NORMAL

## 2023-05-08 PROCEDURE — 258N000001 HC RX 258: Performed by: ORTHOPAEDIC SURGERY

## 2023-05-08 PROCEDURE — 87205 SMEAR GRAM STAIN: CPT | Performed by: ORTHOPAEDIC SURGERY

## 2023-05-08 PROCEDURE — 99207 PR NOT IN PERSON INPATIENT CONSULT STATISTICAL MARKER: CPT | Performed by: PHYSICIAN ASSISTANT

## 2023-05-08 PROCEDURE — 258N000003 HC RX IP 258 OP 636: Performed by: ORTHOPAEDIC SURGERY

## 2023-05-08 PROCEDURE — 250N000009 HC RX 250: Performed by: ANESTHESIOLOGY

## 2023-05-08 PROCEDURE — 250N000013 HC RX MED GY IP 250 OP 250 PS 637: Performed by: ORTHOPAEDIC SURGERY

## 2023-05-08 PROCEDURE — 250N000025 HC SEVOFLURANE, PER MIN: Performed by: ORTHOPAEDIC SURGERY

## 2023-05-08 PROCEDURE — 370N000017 HC ANESTHESIA TECHNICAL FEE, PER MIN: Performed by: ORTHOPAEDIC SURGERY

## 2023-05-08 PROCEDURE — 999N000141 HC STATISTIC PRE-PROCEDURE NURSING ASSESSMENT: Performed by: ORTHOPAEDIC SURGERY

## 2023-05-08 PROCEDURE — 82565 ASSAY OF CREATININE: CPT | Performed by: PHYSICIAN ASSISTANT

## 2023-05-08 PROCEDURE — 87075 CULTR BACTERIA EXCEPT BLOOD: CPT | Performed by: ORTHOPAEDIC SURGERY

## 2023-05-08 PROCEDURE — 97116 GAIT TRAINING THERAPY: CPT | Mod: GP

## 2023-05-08 PROCEDURE — 360N000077 HC SURGERY LEVEL 4, PER MIN: Performed by: ORTHOPAEDIC SURGERY

## 2023-05-08 PROCEDURE — 0SPD09Z REMOVAL OF LINER FROM LEFT KNEE JOINT, OPEN APPROACH: ICD-10-PCS | Performed by: ORTHOPAEDIC SURGERY

## 2023-05-08 PROCEDURE — 87077 CULTURE AEROBIC IDENTIFY: CPT | Performed by: ORTHOPAEDIC SURGERY

## 2023-05-08 PROCEDURE — 97530 THERAPEUTIC ACTIVITIES: CPT | Mod: GP

## 2023-05-08 PROCEDURE — 82565 ASSAY OF CREATININE: CPT | Performed by: ORTHOPAEDIC SURGERY

## 2023-05-08 PROCEDURE — 258N000003 HC RX IP 258 OP 636: Performed by: ANESTHESIOLOGY

## 2023-05-08 PROCEDURE — 250N000011 HC RX IP 250 OP 636: Performed by: ORTHOPAEDIC SURGERY

## 2023-05-08 PROCEDURE — 250N000011 HC RX IP 250 OP 636: Performed by: PHYSICIAN ASSISTANT

## 2023-05-08 PROCEDURE — 87015 SPECIMEN INFECT AGNT CONCNTJ: CPT | Performed by: ORTHOPAEDIC SURGERY

## 2023-05-08 PROCEDURE — 36415 COLL VENOUS BLD VENIPUNCTURE: CPT | Performed by: ORTHOPAEDIC SURGERY

## 2023-05-08 PROCEDURE — 97161 PT EVAL LOW COMPLEX 20 MIN: CPT | Mod: GP

## 2023-05-08 PROCEDURE — 258N000003 HC RX IP 258 OP 636: Performed by: PHYSICIAN ASSISTANT

## 2023-05-08 PROCEDURE — C1776 JOINT DEVICE (IMPLANTABLE): HCPCS | Performed by: ORTHOPAEDIC SURGERY

## 2023-05-08 PROCEDURE — 272N000001 HC OR GENERAL SUPPLY STERILE: Performed by: ORTHOPAEDIC SURGERY

## 2023-05-08 PROCEDURE — 250N000013 HC RX MED GY IP 250 OP 250 PS 637: Performed by: PHYSICIAN ASSISTANT

## 2023-05-08 PROCEDURE — 250N000011 HC RX IP 250 OP 636: Performed by: ANESTHESIOLOGY

## 2023-05-08 PROCEDURE — 0SUD09C SUPPLEMENT LEFT KNEE JOINT WITH LINER, PATELLAR SURFACE, OPEN APPROACH: ICD-10-PCS | Performed by: ORTHOPAEDIC SURGERY

## 2023-05-08 PROCEDURE — 250N000011 HC RX IP 250 OP 636: Performed by: NURSE ANESTHETIST, CERTIFIED REGISTERED

## 2023-05-08 PROCEDURE — 999N000065 XR KNEE PORT LEFT 1/2 VIEWS: Mod: LT

## 2023-05-08 PROCEDURE — 36415 COLL VENOUS BLD VENIPUNCTURE: CPT | Performed by: PHYSICIAN ASSISTANT

## 2023-05-08 PROCEDURE — 710N000009 HC RECOVERY PHASE 1, LEVEL 1, PER MIN: Performed by: ORTHOPAEDIC SURGERY

## 2023-05-08 PROCEDURE — 250N000009 HC RX 250: Performed by: ORTHOPAEDIC SURGERY

## 2023-05-08 PROCEDURE — 120N000001 HC R&B MED SURG/OB

## 2023-05-08 DEVICE — IMPLANTABLE DEVICE: Type: IMPLANTABLE DEVICE | Site: KNEE | Status: FUNCTIONAL

## 2023-05-08 RX ORDER — LIDOCAINE 40 MG/G
CREAM TOPICAL
Status: DISCONTINUED | OUTPATIENT
Start: 2023-05-08 | End: 2023-05-08 | Stop reason: HOSPADM

## 2023-05-08 RX ORDER — ONDANSETRON 2 MG/ML
INJECTION INTRAMUSCULAR; INTRAVENOUS PRN
Status: DISCONTINUED | OUTPATIENT
Start: 2023-05-08 | End: 2023-05-08

## 2023-05-08 RX ORDER — ACETAMINOPHEN 325 MG/1
975 TABLET ORAL EVERY 8 HOURS
Status: DISCONTINUED | OUTPATIENT
Start: 2023-05-08 | End: 2023-05-08

## 2023-05-08 RX ORDER — DEXAMETHASONE SODIUM PHOSPHATE 4 MG/ML
INJECTION, SOLUTION INTRA-ARTICULAR; INTRALESIONAL; INTRAMUSCULAR; INTRAVENOUS; SOFT TISSUE PRN
Status: DISCONTINUED | OUTPATIENT
Start: 2023-05-08 | End: 2023-05-08

## 2023-05-08 RX ORDER — BUPIVACAINE HYDROCHLORIDE AND EPINEPHRINE 5; 5 MG/ML; UG/ML
INJECTION, SOLUTION PERINEURAL
Status: COMPLETED | OUTPATIENT
Start: 2023-05-08 | End: 2023-05-08

## 2023-05-08 RX ORDER — TRANEXAMIC ACID 650 MG/1
1950 TABLET ORAL ONCE
Status: COMPLETED | OUTPATIENT
Start: 2023-05-08 | End: 2023-05-08

## 2023-05-08 RX ORDER — BISACODYL 10 MG
10 SUPPOSITORY, RECTAL RECTAL DAILY PRN
Status: DISCONTINUED | OUTPATIENT
Start: 2023-05-08 | End: 2023-05-08

## 2023-05-08 RX ORDER — FENTANYL CITRATE 50 UG/ML
INJECTION, SOLUTION INTRAMUSCULAR; INTRAVENOUS PRN
Status: DISCONTINUED | OUTPATIENT
Start: 2023-05-08 | End: 2023-05-08

## 2023-05-08 RX ORDER — ONDANSETRON 2 MG/ML
4 INJECTION INTRAMUSCULAR; INTRAVENOUS EVERY 6 HOURS PRN
Status: DISCONTINUED | OUTPATIENT
Start: 2023-05-08 | End: 2023-05-08

## 2023-05-08 RX ORDER — OXYCODONE HYDROCHLORIDE 5 MG/1
5-10 TABLET ORAL EVERY 4 HOURS PRN
Qty: 25 TABLET | Refills: 0 | Status: SHIPPED | OUTPATIENT
Start: 2023-05-08 | End: 2023-10-31

## 2023-05-08 RX ORDER — AMOXICILLIN 250 MG
1 CAPSULE ORAL 2 TIMES DAILY
Status: DISCONTINUED | OUTPATIENT
Start: 2023-05-08 | End: 2023-05-09 | Stop reason: HOSPADM

## 2023-05-08 RX ORDER — ONDANSETRON 4 MG/1
4 TABLET, ORALLY DISINTEGRATING ORAL EVERY 6 HOURS PRN
Status: DISCONTINUED | OUTPATIENT
Start: 2023-05-08 | End: 2023-05-09 | Stop reason: HOSPADM

## 2023-05-08 RX ORDER — FENTANYL CITRATE 50 UG/ML
50 INJECTION, SOLUTION INTRAMUSCULAR; INTRAVENOUS EVERY 5 MIN PRN
Status: DISCONTINUED | OUTPATIENT
Start: 2023-05-08 | End: 2023-05-08 | Stop reason: HOSPADM

## 2023-05-08 RX ORDER — ACETAMINOPHEN 325 MG/1
650 TABLET ORAL EVERY 4 HOURS PRN
Qty: 100 TABLET | Refills: 0 | Status: SHIPPED | OUTPATIENT
Start: 2023-05-08 | End: 2023-10-31

## 2023-05-08 RX ORDER — AMOXICILLIN 250 MG
1 CAPSULE ORAL 2 TIMES DAILY
Status: DISCONTINUED | OUTPATIENT
Start: 2023-05-08 | End: 2023-05-08

## 2023-05-08 RX ORDER — FAMOTIDINE 20 MG/1
20 TABLET, FILM COATED ORAL 2 TIMES DAILY
Status: DISCONTINUED | OUTPATIENT
Start: 2023-05-08 | End: 2023-05-09 | Stop reason: HOSPADM

## 2023-05-08 RX ORDER — ENOXAPARIN SODIUM 100 MG/ML
40 INJECTION SUBCUTANEOUS EVERY 24 HOURS
Status: DISCONTINUED | OUTPATIENT
Start: 2023-05-08 | End: 2023-05-09 | Stop reason: HOSPADM

## 2023-05-08 RX ORDER — SODIUM CHLORIDE, SODIUM LACTATE, POTASSIUM CHLORIDE, CALCIUM CHLORIDE 600; 310; 30; 20 MG/100ML; MG/100ML; MG/100ML; MG/100ML
INJECTION, SOLUTION INTRAVENOUS CONTINUOUS
Status: DISCONTINUED | OUTPATIENT
Start: 2023-05-08 | End: 2023-05-09 | Stop reason: HOSPADM

## 2023-05-08 RX ORDER — OXYCODONE HYDROCHLORIDE 5 MG/1
5 TABLET ORAL EVERY 4 HOURS PRN
Status: DISCONTINUED | OUTPATIENT
Start: 2023-05-08 | End: 2023-05-08

## 2023-05-08 RX ORDER — PROPOFOL 10 MG/ML
INJECTION, EMULSION INTRAVENOUS PRN
Status: DISCONTINUED | OUTPATIENT
Start: 2023-05-08 | End: 2023-05-08

## 2023-05-08 RX ORDER — ACETAMINOPHEN 325 MG/1
975 TABLET ORAL EVERY 8 HOURS
Status: DISCONTINUED | OUTPATIENT
Start: 2023-05-08 | End: 2023-05-09 | Stop reason: HOSPADM

## 2023-05-08 RX ORDER — LABETALOL HYDROCHLORIDE 5 MG/ML
10 INJECTION, SOLUTION INTRAVENOUS
Status: DISCONTINUED | OUTPATIENT
Start: 2023-05-08 | End: 2023-05-08 | Stop reason: HOSPADM

## 2023-05-08 RX ORDER — PROCHLORPERAZINE 25 MG
12.5 SUPPOSITORY, RECTAL RECTAL EVERY 12 HOURS PRN
Status: DISCONTINUED | OUTPATIENT
Start: 2023-05-08 | End: 2023-05-09 | Stop reason: HOSPADM

## 2023-05-08 RX ORDER — ONDANSETRON 2 MG/ML
4 INJECTION INTRAMUSCULAR; INTRAVENOUS EVERY 6 HOURS PRN
Status: DISCONTINUED | OUTPATIENT
Start: 2023-05-08 | End: 2023-05-09 | Stop reason: HOSPADM

## 2023-05-08 RX ORDER — HYDROMORPHONE HCL IN WATER/PF 6 MG/30 ML
0.4 PATIENT CONTROLLED ANALGESIA SYRINGE INTRAVENOUS
Status: DISCONTINUED | OUTPATIENT
Start: 2023-05-08 | End: 2023-05-08

## 2023-05-08 RX ORDER — HYDROXYZINE HYDROCHLORIDE 25 MG/1
25 TABLET, FILM COATED ORAL 3 TIMES DAILY PRN
Status: DISCONTINUED | OUTPATIENT
Start: 2023-05-08 | End: 2023-05-09 | Stop reason: HOSPADM

## 2023-05-08 RX ORDER — CELECOXIB 100 MG/1
100 CAPSULE ORAL 2 TIMES DAILY
Status: DISCONTINUED | OUTPATIENT
Start: 2023-05-08 | End: 2023-05-09 | Stop reason: HOSPADM

## 2023-05-08 RX ORDER — ACETAMINOPHEN 325 MG/1
650 TABLET ORAL EVERY 4 HOURS PRN
Status: DISCONTINUED | OUTPATIENT
Start: 2023-05-11 | End: 2023-05-08

## 2023-05-08 RX ORDER — HYDROMORPHONE HCL IN WATER/PF 6 MG/30 ML
0.2 PATIENT CONTROLLED ANALGESIA SYRINGE INTRAVENOUS
Status: DISCONTINUED | OUTPATIENT
Start: 2023-05-08 | End: 2023-05-08

## 2023-05-08 RX ORDER — NALOXONE HYDROCHLORIDE 0.4 MG/ML
0.4 INJECTION, SOLUTION INTRAMUSCULAR; INTRAVENOUS; SUBCUTANEOUS
Status: DISCONTINUED | OUTPATIENT
Start: 2023-05-08 | End: 2023-05-09 | Stop reason: HOSPADM

## 2023-05-08 RX ORDER — HYDROMORPHONE HCL IN WATER/PF 6 MG/30 ML
0.2 PATIENT CONTROLLED ANALGESIA SYRINGE INTRAVENOUS EVERY 5 MIN PRN
Status: DISCONTINUED | OUTPATIENT
Start: 2023-05-08 | End: 2023-05-08 | Stop reason: HOSPADM

## 2023-05-08 RX ORDER — AMOXICILLIN 250 MG
1-2 CAPSULE ORAL 2 TIMES DAILY
Qty: 30 TABLET | Refills: 0 | Status: SHIPPED | OUTPATIENT
Start: 2023-05-08 | End: 2023-10-31

## 2023-05-08 RX ORDER — CEFAZOLIN SODIUM/WATER 2 G/20 ML
2 SYRINGE (ML) INTRAVENOUS SEE ADMIN INSTRUCTIONS
Status: DISCONTINUED | OUTPATIENT
Start: 2023-05-08 | End: 2023-05-08 | Stop reason: HOSPADM

## 2023-05-08 RX ORDER — PROCHLORPERAZINE MALEATE 5 MG
5 TABLET ORAL EVERY 6 HOURS PRN
Status: DISCONTINUED | OUTPATIENT
Start: 2023-05-08 | End: 2023-05-08

## 2023-05-08 RX ORDER — LIDOCAINE 40 MG/G
CREAM TOPICAL
Status: DISCONTINUED | OUTPATIENT
Start: 2023-05-08 | End: 2023-05-09 | Stop reason: HOSPADM

## 2023-05-08 RX ORDER — PROCHLORPERAZINE MALEATE 5 MG
5 TABLET ORAL EVERY 6 HOURS PRN
Status: DISCONTINUED | OUTPATIENT
Start: 2023-05-08 | End: 2023-05-09 | Stop reason: HOSPADM

## 2023-05-08 RX ORDER — VANCOMYCIN HYDROCHLORIDE 1 G/20ML
INJECTION, POWDER, LYOPHILIZED, FOR SOLUTION INTRAVENOUS PRN
Status: DISCONTINUED | OUTPATIENT
Start: 2023-05-08 | End: 2023-05-08 | Stop reason: HOSPADM

## 2023-05-08 RX ORDER — SODIUM CHLORIDE 9 MG/ML
INJECTION, SOLUTION INTRAVENOUS CONTINUOUS
Status: DISCONTINUED | OUTPATIENT
Start: 2023-05-08 | End: 2023-05-09 | Stop reason: HOSPADM

## 2023-05-08 RX ORDER — NALOXONE HYDROCHLORIDE 0.4 MG/ML
0.2 INJECTION, SOLUTION INTRAMUSCULAR; INTRAVENOUS; SUBCUTANEOUS
Status: DISCONTINUED | OUTPATIENT
Start: 2023-05-08 | End: 2023-05-09 | Stop reason: HOSPADM

## 2023-05-08 RX ORDER — ENOXAPARIN SODIUM 100 MG/ML
40 INJECTION SUBCUTANEOUS EVERY 24 HOURS
Status: DISCONTINUED | OUTPATIENT
Start: 2023-05-09 | End: 2023-05-08

## 2023-05-08 RX ORDER — OXYCODONE HYDROCHLORIDE 10 MG/1
10 TABLET ORAL EVERY 4 HOURS PRN
Status: DISCONTINUED | OUTPATIENT
Start: 2023-05-08 | End: 2023-05-08

## 2023-05-08 RX ORDER — ONDANSETRON 2 MG/ML
4 INJECTION INTRAMUSCULAR; INTRAVENOUS EVERY 30 MIN PRN
Status: DISCONTINUED | OUTPATIENT
Start: 2023-05-08 | End: 2023-05-08 | Stop reason: HOSPADM

## 2023-05-08 RX ORDER — ACETAMINOPHEN 325 MG/1
975 TABLET ORAL ONCE
Status: COMPLETED | OUTPATIENT
Start: 2023-05-08 | End: 2023-05-08

## 2023-05-08 RX ORDER — ONDANSETRON 4 MG/1
4 TABLET, ORALLY DISINTEGRATING ORAL EVERY 30 MIN PRN
Status: DISCONTINUED | OUTPATIENT
Start: 2023-05-08 | End: 2023-05-08 | Stop reason: HOSPADM

## 2023-05-08 RX ORDER — POLYETHYLENE GLYCOL 3350 17 G/17G
17 POWDER, FOR SOLUTION ORAL DAILY
Status: DISCONTINUED | OUTPATIENT
Start: 2023-05-09 | End: 2023-05-09 | Stop reason: HOSPADM

## 2023-05-08 RX ORDER — HYDROXYZINE HYDROCHLORIDE 10 MG/1
10 TABLET, FILM COATED ORAL EVERY 6 HOURS PRN
Status: DISCONTINUED | OUTPATIENT
Start: 2023-05-08 | End: 2023-05-09 | Stop reason: DRUGHIGH

## 2023-05-08 RX ORDER — SODIUM CHLORIDE, SODIUM LACTATE, POTASSIUM CHLORIDE, CALCIUM CHLORIDE 600; 310; 30; 20 MG/100ML; MG/100ML; MG/100ML; MG/100ML
INJECTION, SOLUTION INTRAVENOUS CONTINUOUS
Status: DISCONTINUED | OUTPATIENT
Start: 2023-05-08 | End: 2023-05-08 | Stop reason: HOSPADM

## 2023-05-08 RX ORDER — LIDOCAINE 40 MG/G
CREAM TOPICAL
Status: DISCONTINUED | OUTPATIENT
Start: 2023-05-08 | End: 2023-05-08

## 2023-05-08 RX ORDER — OXYCODONE HYDROCHLORIDE 5 MG/1
5 TABLET ORAL EVERY 4 HOURS PRN
Status: DISCONTINUED | OUTPATIENT
Start: 2023-05-08 | End: 2023-05-09 | Stop reason: HOSPADM

## 2023-05-08 RX ORDER — BISACODYL 10 MG
10 SUPPOSITORY, RECTAL RECTAL DAILY PRN
Status: DISCONTINUED | OUTPATIENT
Start: 2023-05-08 | End: 2023-05-09 | Stop reason: HOSPADM

## 2023-05-08 RX ORDER — CEFAZOLIN SODIUM 2 G/100ML
2 INJECTION, SOLUTION INTRAVENOUS EVERY 8 HOURS
Status: COMPLETED | OUTPATIENT
Start: 2023-05-08 | End: 2023-05-08

## 2023-05-08 RX ORDER — LIDOCAINE HYDROCHLORIDE 20 MG/ML
INJECTION, SOLUTION INFILTRATION; PERINEURAL PRN
Status: DISCONTINUED | OUTPATIENT
Start: 2023-05-08 | End: 2023-05-08

## 2023-05-08 RX ORDER — ONDANSETRON 4 MG/1
4 TABLET, ORALLY DISINTEGRATING ORAL EVERY 6 HOURS PRN
Status: DISCONTINUED | OUTPATIENT
Start: 2023-05-08 | End: 2023-05-08

## 2023-05-08 RX ORDER — HYDROMORPHONE HCL IN WATER/PF 6 MG/30 ML
0.2 PATIENT CONTROLLED ANALGESIA SYRINGE INTRAVENOUS
Status: DISCONTINUED | OUTPATIENT
Start: 2023-05-08 | End: 2023-05-09 | Stop reason: HOSPADM

## 2023-05-08 RX ORDER — CEFAZOLIN SODIUM/WATER 2 G/20 ML
2 SYRINGE (ML) INTRAVENOUS
Status: COMPLETED | OUTPATIENT
Start: 2023-05-08 | End: 2023-05-08

## 2023-05-08 RX ORDER — HYDROMORPHONE HCL IN WATER/PF 6 MG/30 ML
0.4 PATIENT CONTROLLED ANALGESIA SYRINGE INTRAVENOUS EVERY 5 MIN PRN
Status: DISCONTINUED | OUTPATIENT
Start: 2023-05-08 | End: 2023-05-08 | Stop reason: HOSPADM

## 2023-05-08 RX ORDER — AMOXICILLIN 250 MG
2 CAPSULE ORAL 2 TIMES DAILY
Status: DISCONTINUED | OUTPATIENT
Start: 2023-05-08 | End: 2023-05-09 | Stop reason: HOSPADM

## 2023-05-08 RX ORDER — FENTANYL CITRATE 50 UG/ML
25 INJECTION, SOLUTION INTRAMUSCULAR; INTRAVENOUS EVERY 5 MIN PRN
Status: DISCONTINUED | OUTPATIENT
Start: 2023-05-08 | End: 2023-05-08 | Stop reason: HOSPADM

## 2023-05-08 RX ORDER — ONDANSETRON 2 MG/ML
4 INJECTION INTRAMUSCULAR; INTRAVENOUS EVERY 6 HOURS
Status: COMPLETED | OUTPATIENT
Start: 2023-05-08 | End: 2023-05-09

## 2023-05-08 RX ADMIN — FENTANYL CITRATE 50 MCG: 50 INJECTION, SOLUTION INTRAMUSCULAR; INTRAVENOUS at 07:57

## 2023-05-08 RX ADMIN — ENOXAPARIN SODIUM 40 MG: 40 INJECTION SUBCUTANEOUS at 23:14

## 2023-05-08 RX ADMIN — SODIUM CHLORIDE 1000 ML: 9 INJECTION, SOLUTION INTRAVENOUS at 11:33

## 2023-05-08 RX ADMIN — ONDANSETRON 4 MG: 2 INJECTION INTRAMUSCULAR; INTRAVENOUS at 08:27

## 2023-05-08 RX ADMIN — FENTANYL CITRATE 50 MCG: 0.05 INJECTION, SOLUTION INTRAMUSCULAR; INTRAVENOUS at 09:14

## 2023-05-08 RX ADMIN — OXYCODONE HYDROCHLORIDE 5 MG: 5 TABLET ORAL at 23:13

## 2023-05-08 RX ADMIN — CELECOXIB 100 MG: 100 CAPSULE ORAL at 11:33

## 2023-05-08 RX ADMIN — FENTANYL CITRATE 50 MCG: 50 INJECTION, SOLUTION INTRAMUSCULAR; INTRAVENOUS at 09:06

## 2023-05-08 RX ADMIN — Medication 2 G: at 07:23

## 2023-05-08 RX ADMIN — SODIUM CHLORIDE, POTASSIUM CHLORIDE, SODIUM LACTATE AND CALCIUM CHLORIDE: 600; 310; 30; 20 INJECTION, SOLUTION INTRAVENOUS at 07:18

## 2023-05-08 RX ADMIN — FENTANYL CITRATE 100 MCG: 50 INJECTION, SOLUTION INTRAMUSCULAR; INTRAVENOUS at 07:22

## 2023-05-08 RX ADMIN — OXYCODONE HYDROCHLORIDE 5 MG: 5 TABLET ORAL at 15:01

## 2023-05-08 RX ADMIN — CELECOXIB 100 MG: 100 CAPSULE ORAL at 19:53

## 2023-05-08 RX ADMIN — HYDROXYZINE HYDROCHLORIDE 25 MG: 25 TABLET, FILM COATED ORAL at 18:57

## 2023-05-08 RX ADMIN — ACETAMINOPHEN 975 MG: 325 TABLET ORAL at 14:22

## 2023-05-08 RX ADMIN — FAMOTIDINE 20 MG: 20 TABLET ORAL at 19:54

## 2023-05-08 RX ADMIN — HYDROMORPHONE HYDROCHLORIDE 0.2 MG: 0.2 INJECTION, SOLUTION INTRAMUSCULAR; INTRAVENOUS; SUBCUTANEOUS at 09:42

## 2023-05-08 RX ADMIN — SODIUM CHLORIDE, POTASSIUM CHLORIDE, SODIUM LACTATE AND CALCIUM CHLORIDE: 600; 310; 30; 20 INJECTION, SOLUTION INTRAVENOUS at 11:35

## 2023-05-08 RX ADMIN — OXYCODONE HYDROCHLORIDE 5 MG: 5 TABLET ORAL at 09:45

## 2023-05-08 RX ADMIN — CEFAZOLIN SODIUM 2 G: 2 INJECTION, SOLUTION INTRAVENOUS at 23:19

## 2023-05-08 RX ADMIN — TRANEXAMIC ACID 1950 MG: 650 TABLET ORAL at 06:00

## 2023-05-08 RX ADMIN — SODIUM CHLORIDE, POTASSIUM CHLORIDE, SODIUM LACTATE AND CALCIUM CHLORIDE: 600; 310; 30; 20 INJECTION, SOLUTION INTRAVENOUS at 08:28

## 2023-05-08 RX ADMIN — ACETAMINOPHEN 975 MG: 325 TABLET ORAL at 05:59

## 2023-05-08 RX ADMIN — PROPOFOL 160 MG: 10 INJECTION, EMULSION INTRAVENOUS at 07:22

## 2023-05-08 RX ADMIN — DOCUSATE SODIUM AND SENNOSIDES 1 TABLET: 8.6; 5 TABLET, FILM COATED ORAL at 19:54

## 2023-05-08 RX ADMIN — DOCUSATE SODIUM AND SENNOSIDES 1 TABLET: 8.6; 5 TABLET, FILM COATED ORAL at 11:33

## 2023-05-08 RX ADMIN — FENTANYL CITRATE 50 MCG: 50 INJECTION, SOLUTION INTRAMUSCULAR; INTRAVENOUS at 08:33

## 2023-05-08 RX ADMIN — DEXAMETHASONE SODIUM PHOSPHATE 4 MG: 4 INJECTION, SOLUTION INTRA-ARTICULAR; INTRALESIONAL; INTRAMUSCULAR; INTRAVENOUS; SOFT TISSUE at 07:22

## 2023-05-08 RX ADMIN — FENTANYL CITRATE 50 MCG: 50 INJECTION, SOLUTION INTRAMUSCULAR; INTRAVENOUS at 07:51

## 2023-05-08 RX ADMIN — LIDOCAINE HYDROCHLORIDE 30 MG: 10 INJECTION, SOLUTION EPIDURAL; INFILTRATION; INTRACAUDAL; PERINEURAL at 07:22

## 2023-05-08 RX ADMIN — OXYCODONE HYDROCHLORIDE 5 MG: 5 TABLET ORAL at 18:54

## 2023-05-08 RX ADMIN — FAMOTIDINE 20 MG: 20 TABLET ORAL at 11:33

## 2023-05-08 RX ADMIN — BUPIVACAINE HYDROCHLORIDE AND EPINEPHRINE BITARTRATE 20 ML: 5; .005 INJECTION, SOLUTION PERINEURAL at 07:04

## 2023-05-08 RX ADMIN — ACETAMINOPHEN 975 MG: 325 TABLET ORAL at 17:27

## 2023-05-08 RX ADMIN — FENTANYL CITRATE 50 MCG: 0.05 INJECTION, SOLUTION INTRAMUSCULAR; INTRAVENOUS at 09:30

## 2023-05-08 RX ADMIN — SODIUM CHLORIDE: 9 INJECTION, SOLUTION INTRAVENOUS at 17:29

## 2023-05-08 RX ADMIN — CEFAZOLIN SODIUM 2 G: 2 INJECTION, SOLUTION INTRAVENOUS at 17:27

## 2023-05-08 ASSESSMENT — ACTIVITIES OF DAILY LIVING (ADL)
ADLS_ACUITY_SCORE: 20
ADLS_ACUITY_SCORE: 23
ADLS_ACUITY_SCORE: 20
ADLS_ACUITY_SCORE: 23
ADLS_ACUITY_SCORE: 20
ADLS_ACUITY_SCORE: 21
ADLS_ACUITY_SCORE: 23
ADLS_ACUITY_SCORE: 20
ADLS_ACUITY_SCORE: 23

## 2023-05-08 NOTE — OP NOTE
Procedure Date: 05/08/2023    PREOPERATIVE DIAGNOSIS:  Polyethylene failure, left total knee arthroplasty.    POSTOPERATIVE DIAGNOSIS:  Polyethylene failure, left total knee arthroplasty.    PROCEDURE:  Polyethylene exchange of left total knee arthroplasty using Dayanna NexGen LPS knee.    SURGEON:  Prasad Valladares MD.    FIRST ASSISTANT:  Maryanne Hassan PA-C.    INDICATIONS FOR SURGERY:  The patient is a very pleasant, 68-year-old female who had a left total knee arthroplasty done by one of my senior partners, since retired, 16 years ago.  This knee has done extremely well for her up until about a month ago, when she felt a pop in the knee and had immediate instability.  She had a positive drawer sign.  X-rays show her implant appears to be well fixed, but her clinical symptoms are consistent with a fracture of a post of a PS knee arthroplasty.  We discussed treatment options and recommended polyethylene exchange, as her implants appeared to be well fixed.  We would evaluate the stability of her implants at the time of surgery and revise as needed.  She understands all this and is happy with this plan of care.    NARRATIVE EVENTS:  After thorough evaluation and proper identification of the patient to be operated on, the patient was taken to the operating room, where she underwent general anesthetic as well as being given a femoral nerve block.   She was placed supine on the operating table.  Her left leg was prepped and draped in the usual sterile manner.  After appropriate surgical pause, confirming the patient to be operated on and that she received 2 grams of Ancef, the left leg was exsanguinated, and the tourniquet was raised to 300 mmHg on the left upper thigh.  I approached the left knee through her previous incision.  The skin and soft tissue were sharply dissected down to the patella, where a median parapatellar arthrotomy was performed.  The fluid within the joint, the synovial fluid, was quite benign  appearing, but a sample of this was sent for culture.  We also sent 2 other samples of the soft tissues.  We performed a moderate synovectomy of the knee.  The polyethylene, as was expected, was broken at its post, and these pieces were removed.  There was a small amount of polyethylene debris within the knee.  This was all removed grossly.  We then thoroughly irrigated the knee and performed a moderate synovectomy then evaluated the implants.  The implants all appeared well fixed, so these we tended to leave.  We retrialed our polyethylene inserts and found that the 23 mm PS insert gave the best stability and full range of motion.  This was impacted into position then, due to its size, fixed into position using a set screw.  Once this was done, we then thoroughly irrigated with dilute Betadine wash followed by saline.  We closed the arthrotomy with 0 Vicryl sutures and running Stratafix suture, placed 1 gram of powdered vancomycin deep to the arthrotomy and closed the skin and soft tissues with absorbable sutures and staples in the skin.  The patient was placed in a well-padded postoperative dressing, taken to recovery room in stable condition.  She tolerated the procedure without difficulty.    Prasad Valladares MD        D: 2023   T: 2023   MT: catherine    Name:     RIYA JENSEN  MRN:      [id number]        Account:        [id number]   :      1955           Procedure Date: 2023     Document: [id number]

## 2023-05-08 NOTE — ANESTHESIA PROCEDURE NOTES
Adductor canal Procedure Note    Pre-Procedure   Staff -        Anesthesiologist:  Klever Ross MD       Performed By: anesthesiologist       Referred By: [facility]       Location: pre-op       Procedure Start/Stop Times: 5/8/2023 7:04 AM and 5/8/2023 7:14 AM       Pre-Anesthestic Checklist: patient identified, IV checked, site marked, risks and benefits discussed, informed consent, monitors and equipment checked, pre-op evaluation, at physician/surgeon's request and post-op pain management  Timeout:       Correct Patient: Yes        Correct Procedure: Yes        Correct Site: Yes        Correct Position: Yes        Correct Laterality: Yes        Site Marked: Yes  Procedure Documentation  Procedure: Adductor canal       Laterality: left       Patient Position: supine       Patient Prep/Sterile Barriers: sterile gloves, mask       Skin prep: Betadine       Needle Type: insulated       Needle Gauge: 22.        Needle Length (Inches): 2        Ultrasound guided       1. Ultrasound was used to identify targeted nerve, plexus, vascular marker, or fascial plane and place a needle adjacent to it in real-time.       2. Ultrasound was used to visualize the spread of anesthetic in close proximity to the above referenced structure.    Assessment/Narrative         The placement was negative for: blood aspirated, painful injection and site bleeding       Paresthesias: No.       Test dose of mL at.         Test dose negative, 3 minutes after injection, for signs of intravascular, subdural, or intrathecal injection.       Bolus given via needle..        Secured via.        Insertion/Infusion Method: Single Shot       Complications: none       Injection made incrementally with aspirations every 5 mL.    Medication(s) Administered   Bupivacaine 0.5% w/ 1:200K Epi (Other) - Other   20 mL - 5/8/2023 7:04:00 AM  Medication Administration Time: 5/8/2023 7:04 AM     Comments:  The surgeon has given a verbal order transferring care  "of this patient to me for the performance of a regional analgesia block for post-op pain control. It is requested of me because I am uniquely trained and qualified to perform this block and the surgeon is neither trained nor qualified to perform this procedure.    20 ml 0.5% bupivacaine with 1:200k epi        FOR [facility] ONLY:   Pain Team Contact information: please page the Pain Team Via [employer]. Search \"Pain\". During daytime hours, please page the attending first. At night please page the resident first.      "

## 2023-05-08 NOTE — BRIEF OP NOTE
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]  Post-operative diagnosis Same as pre-operative diagnosis    Procedure: Procedure(s):  Left total knee arthroplasty poly ethylene spacer exchange  Surgeon: Surgeon(s) and Role:     * Prasad Valladares MD - Primary     * Maryanne Hasasn PA-C - Assisting  Anesthesia: Choice   Estimated Blood Loss: 2 mL from 5/8/2023  7:18 AM to 5/8/2023  8:56 AM      Drains: None  Specimens:   ID Type Source Tests Collected by Time Destination   A : left knee aspirate Aspirate Knee, Left ANAEROBIC BACTERIAL CULTURE ROUTINE, GRAM STAIN, AEROBIC BACTERIAL CULTURE ROUTINE Prasad Valladares MD 5/8/2023  7:50 AM    B : left knee tissue #1 Tissue Knee, Left ANAEROBIC BACTERIAL CULTURE ROUTINE, GRAM STAIN, AEROBIC BACTERIAL CULTURE ROUTINE Prasad Valladares MD 5/8/2023  7:53 AM    C : left knee tissue #2 Tissue Knee, Left ANAEROBIC BACTERIAL CULTURE ROUTINE, GRAM STAIN, AEROBIC BACTERIAL CULTURE ROUTINE Prasad Valladares MD 5/8/2023  7:55 AM      Findings:   None.  Complications: None.  Implants:   Implant Name Type Inv. Item Serial No.  Lot No. LRB No. Used Action   IMP ART SURFACE ZIM NEXGEN LPS CD 3-4 23MM -971-23 - [id number] Total Joint Component/Insert IMP ART SURFACE ZIM NEXGEN LPS CD 3-4 23MM -021-23  [employer] [id number] Left 1 Implanted   IMP ART SURFACE ZIM NEXGEN - POLY (poly exchange, left knee)      Left 1 Explanted

## 2023-05-08 NOTE — ANESTHESIA PREPROCEDURE EVALUATION
Anesthesia Pre-Procedure Evaluation    Patient: Rich Terry   MRN: [id number] : 1955        Procedure : Procedure(s):  Left total knee arthroplasty poly ethylene spacer exchange          Past Medical History:   Diagnosis Date     Generalized osteoarthrosis, unspecified site      Pain in right knee      Phlebitis and thrombophlebitis of superficial vessels of lower extremities     several episodes     Thrombosis of leg     superficial, not deep     Varicose veins of lower extremities with ulcer (H)       Past Surgical History:   Procedure Laterality Date     ARTHROPLASTY KNEE Right 2015    Procedure: ARTHROPLASTY KNEE;  Surgeon: Prasad Valladares MD;  Location: [facility] OR     saphenous vein radiofrequency ablation[  2012     SURGICAL HISTORY OF -       rt LE varicose vein procedure     SURGICAL HISTORY OF -   05    Tibial osteotomy (lt leg)     varicose vein avulsions/stab phlebectomy[  2012     [facility] NONSPECIFIC PROCEDURE      4 knee surgeries (all left knee)     [facility] NONSPECIFIC PROCEDURE      (L) planter fascia surgery      Allergies   Allergen Reactions     Levaquin Muscle Pain (Myalgia)     Shrimp      throat swells     Sulfa Antibiotics Rash     rash      Social History     Tobacco Use     Smoking status: Former     Packs/day: 1.00     Years: 10.00     Pack years: 10.00     Types: Cigarettes     Start date:      Quit date:      Years since quittin.3     Smokeless tobacco: Never   Vaping Use     Vaping status: Never Used     Passive vaping exposure: Yes   Substance Use Topics     Alcohol use: Yes     Comment: 1-2 drinks a month      Wt Readings from Last 1 Encounters:   23 109.4 kg (241 lb 1.6 oz)        Anesthesia Evaluation   Pt has had prior anesthetic. Type: General and Regional.    No history of anesthetic complications       ROS/MED HX  ENT/Pulmonary:  - neg pulmonary ROS     Neurologic:  - neg neurologic ROS     Cardiovascular:  - neg cardiovascular ROS      METS/Exercise Tolerance:     Hematologic:     (+) History of blood clots, pt is not anticoagulated,     Musculoskeletal:   (+) arthritis,     GI/Hepatic:  - neg GI/hepatic ROS     Renal/Genitourinary:  - neg Renal ROS     Endo:     (+) Obesity,     Psychiatric/Substance Use:  - neg psychiatric ROS     Infectious Disease:  - neg infectious disease ROS     Malignancy:       Other:            Physical Exam    Airway        Mallampati: II   TM distance: > 3 FB   Neck ROM: full   Mouth opening: > 3 cm    Respiratory Devices and Support         Dental       (+) Minor Abnormalities - some fillings, tiny chips      Cardiovascular   cardiovascular exam normal          Pulmonary   pulmonary exam normal                OUTSIDE LABS:  CBC:   Lab Results   Component Value Date    WBC 7.9 02/20/2023    WBC 9.1 07/13/2021    HGB 13.8 02/20/2023    HGB 14.0 07/13/2021    HCT 44.0 02/20/2023    HCT 43.4 07/13/2021     02/20/2023     07/13/2021     BMP:   Lab Results   Component Value Date     07/13/2021     06/11/2018    POTASSIUM 4.4 07/13/2021    POTASSIUM 4.7 06/11/2018    CHLORIDE 107 07/13/2021    CHLORIDE 109 06/11/2018    CO2 25 07/13/2021    CO2 22 06/11/2018    BUN 10 07/13/2021    BUN 10 06/11/2018    CR 1.00 07/13/2021    CR 0.81 06/11/2018    GLC 98 07/13/2021    GLC 96 06/11/2018     COAGS:   Lab Results   Component Value Date    PTT 30 10/31/2007    INR 0.90 10/31/2007     POC:   Lab Results   Component Value Date     (H) 11/02/2015     HEPATIC:   Lab Results   Component Value Date    ALBUMIN 3.5 07/13/2021    PROTTOTAL 7.0 07/13/2021    ALT 26 07/13/2021    AST 27 07/13/2021    ALKPHOS 128 07/13/2021    BILITOTAL 0.4 07/13/2021     OTHER:   Lab Results   Component Value Date    A1C 6.0 10/26/2015    CONSTANTINE 9.6 07/13/2021    TSH 1.36 07/13/2021    CRP 7.8 05/11/2021    SED 21 02/20/2023       Anesthesia Plan    ASA Status:  2      Anesthesia Type: General.     - Airway: LMA   Induction:  Intravenous.   Maintenance: Balanced.        Consents    Anesthesia Plan(s) and associated risks, benefits, and realistic alternatives discussed. Questions answered and patient/representative(s) expressed understanding.    - Discussed:     - Discussed with:  Patient      - Extended Intubation/Ventilatory Support Discussed: No.      - Patient is DNR/DNI Status: No    Use of blood products discussed: No .     Postoperative Care    Pain management: IV analgesics, Oral pain medications, Postop Epidural, Peripheral nerve block (Single Shot).   PONV prophylaxis: Ondansetron (or other 5HT-3), Dexamethasone or Solumedrol     Comments:                Klever Ross MD

## 2023-05-08 NOTE — ANESTHESIA CARE TRANSFER NOTE
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    Anesthesia Type:   General     Note:    Oropharynx: oropharynx clear of all foreign objects  Level of Consciousness: awake  Oxygen Supplementation: room air    Independent Airway: airway patency satisfactory and stable  Dentition: dentition unchanged  Vital Signs Stable: post-procedure vital signs reviewed and stable  Report to RN Given: handoff report given  Patient transferred to: PACU    Handoff Report: Identifed the Patient, Identified the Reponsible Provider, Reviewed the pertinent medical history, Discussed the surgical course, Reviewed Intra-OP anesthesia mangement and issues during anesthesia, Set expectations for post-procedure period and Allowed opportunity for questions and acknowledgement of understanding      Vitals:  Vitals Value Taken Time   /83 05/08/23 0905   Temp 97.3  F (36.3  C) 05/08/23 0858   Pulse 79 05/08/23 0906   Resp 17 05/08/23 0906   SpO2 94 % 05/08/23 0906   Vitals shown include unvalidated device data.    Electronically Signed By: DUTCH Leomn CRNA  May 8, 2023  9:07 AM

## 2023-05-08 NOTE — PROGRESS NOTES
05/08/23 1633   Appointment Info   Signing Clinician's Name / Credentials (PT) Sherice Guevara DPT   Rehab Comments (PT) LLE WBAT   Living Environment   People in Home spouse   Current Living Arrangements house   Home Accessibility stairs to enter home;stairs within home   Number of Stairs, Main Entrance 3   Stair Railings, Main Entrance railing on left side (ascending)   Number of Stairs, Within Home, Primary greater than 10 stairs   Stair Railings, Within Home, Primary railings safe and in good condition   Transportation Anticipated family or friend will provide   Living Environment Comments Pt reports 3 ALENA then all needs met. Does not need absement. Tub shower.   Self-Care   Usual Activity Tolerance good   Current Activity Tolerance moderate   Regular Exercise No   Equipment Currently Used at Home raised toilet seat   Fall history within last six months no   Activity/Exercise/Self-Care Comment Pt reports IND at baseline. Has crutches at home but no other AD, does not use.   General Information   Onset of Illness/Injury or Date of Surgery 05/08/23   Referring Physician Maryanne Hassan, JUAN   Patient/Family Therapy Goals Statement (PT) return home   Pertinent History of Current Problem (include personal factors and/or comorbidities that impact the POC) Pt is 67 yo female who underwent L TKA on 5/8/2023.   Existing Precautions/Restrictions fall;weight bearing   Weight-Bearing Status - LLE weight-bearing as tolerated   Cognition   Affect/Mental Status (Cognition) WFL   Orientation Status (Cognition) oriented x 4   Follows Commands (Cognition) WFL   Pain Assessment   Patient Currently in Pain Yes, see Vital Sign flowsheet   Integumentary/Edema   Integumentary/Edema Comments LLE ACE bandage intact   Posture    Posture Forward head position   Range of Motion (ROM)   ROM Comment L knee flex ~40 degrees   Strength (Manual Muscle Testing)   Strength (Manual Muscle Testing) Deficits observed during functional mobility    Strength Comments unable to SLR with LLE; RLE WFL   Bed Mobility   Comment, (Bed Mobility) supine<>sit with HOB elevated, mod A for LLE   Transfers   Comment, (Transfers) STS with FWW and CGA   Gait/Stairs (Locomotion)   Distance in Feet 10   Comment, (Gait/Stairs) amb with FWW and CGA, mild unsteadiness initially   Balance   Balance Comments impaired, needing A and FWW d/t falls risk post op   Sensory Examination   Sensory Perception patient reports no sensory changes   Clinical Impression   Criteria for Skilled Therapeutic Intervention Yes, treatment indicated   PT Diagnosis (PT) impaired functional mobility   Influenced by the following impairments weakness, pain, post op status   Functional limitations due to impairments difficulty with bed mobility, transfers, ambulation, stairs   Clinical Presentation (PT Evaluation Complexity) Stable/Uncomplicated   Clinical Presentation Rationale clinical judgement   Clinical Decision Making (Complexity) low complexity   Planned Therapy Interventions (PT) balance training;bed mobility training;gait training;home exercise program;neuromuscular re-education;patient/family education;ROM (range of motion);stair training;strengthening;transfer training;progressive activity/exercise;risk factor education;home program guidelines   Anticipated Equipment Needs at Discharge (PT) walker, rolling   Risk & Benefits of therapy have been explained evaluation/treatment results reviewed;care plan/treatment goals reviewed;risks/benefits reviewed;current/potential barriers reviewed;participants voiced agreement with care plan;participants included;patient;spouse/significant other   PT Total Evaluation Time   PT Eval, Low Complexity Minutes (47946) 10   Plan of Care Review   Plan of Care Reviewed With patient   Physical Therapy Goals   PT Frequency Daily   PT Predicted Duration/Target Date for Goal Attainment 05/10/23   PT Goals Bed Mobility;Transfers;Gait;Stairs   PT: Bed Mobility  Independent;Supine to/from sit;Within precautions   PT: Transfers Modified independent;Sit to/from stand;Assistive device;Within precautions   PT: Gait Modified independent;Assistive device;Within precautions;Greater than 200 feet   PT: Stairs Supervision/stand-by assist;3 stairs;Assistive device;Rail on left;Within precautions   PT Discharge Planning   PT Plan PT: TKA handout, progress IND bed mob flat bed, progress gait distance, 3 stairs L rail, FWW for d/c   PT Discharge Recommendation (DC Rec)   (defer to ortho)   PT Rationale for DC Rec Anticipate with additional PT session that pt will be able to complete mobility needed for home including stairs with assist from spouse. Will need FWW upon d/c   PT Brief overview of current status A x 1 FWW   Total Session Time   Total Session Time (sum of timed and untimed services) 10

## 2023-05-08 NOTE — ANESTHESIA POSTPROCEDURE EVALUATION
Patient: Rich Terry    Procedure: Procedure(s):  Left total knee arthroplasty poly ethylene spacer exchange       Anesthesia Type:  General    Note:  Disposition: Admission   Postop Pain Control: Uneventful            Sign Out: Well controlled pain   PONV: No   Neuro/Psych: Uneventful            Sign Out: Acceptable/Baseline neuro status   Airway/Respiratory: Uneventful            Sign Out: Acceptable/Baseline resp. status   CV/Hemodynamics: Uneventful            Sign Out: Acceptable CV status; No obvious hypovolemia; No obvious fluid overload   Other NRE: NONE   DID A NON-ROUTINE EVENT OCCUR? No           Last vitals:  Vitals Value Taken Time   /81 05/08/23 0938   Temp 97.3  F (36.3  C) 05/08/23 0858   Pulse 74 05/08/23 0946   Resp 18 05/08/23 0946   SpO2 97 % 05/08/23 0946   Vitals shown include unvalidated device data.    Electronically Signed By: Klever Ross MD  May 8, 2023  9:48 AM

## 2023-05-09 ENCOUNTER — APPOINTMENT (OUTPATIENT)
Dept: OCCUPATIONAL THERAPY | Facility: CLINIC | Age: 68
DRG: 465 | End: 2023-05-09
Attending: ORTHOPAEDIC SURGERY
Payer: MEDICARE

## 2023-05-09 ENCOUNTER — APPOINTMENT (OUTPATIENT)
Dept: PHYSICAL THERAPY | Facility: CLINIC | Age: 68
DRG: 465 | End: 2023-05-09
Attending: ORTHOPAEDIC SURGERY
Payer: MEDICARE

## 2023-05-09 VITALS
HEART RATE: 91 BPM | SYSTOLIC BLOOD PRESSURE: 136 MMHG | WEIGHT: 241.1 LBS | TEMPERATURE: 97.5 F | HEIGHT: 67 IN | BODY MASS INDEX: 37.84 KG/M2 | DIASTOLIC BLOOD PRESSURE: 70 MMHG | RESPIRATION RATE: 16 BRPM | OXYGEN SATURATION: 96 %

## 2023-05-09 LAB
ERYTHROCYTE [DISTWIDTH] IN BLOOD BY AUTOMATED COUNT: 13.9 % (ref 10–15)
GLUCOSE BLDC GLUCOMTR-MCNC: 109 MG/DL (ref 70–99)
HCT VFR BLD AUTO: 35.3 % (ref 35–47)
HGB BLD-MCNC: 11.5 G/DL (ref 11.7–15.7)
MCH RBC QN AUTO: 31.2 PG (ref 26.5–33)
MCHC RBC AUTO-ENTMCNC: 32.6 G/DL (ref 31.5–36.5)
MCV RBC AUTO: 96 FL (ref 78–100)
PLATELET # BLD AUTO: 301 10E3/UL (ref 150–450)
RBC # BLD AUTO: 3.69 10E6/UL (ref 3.8–5.2)
WBC # BLD AUTO: 10 10E3/UL (ref 4–11)

## 2023-05-09 PROCEDURE — 250N000013 HC RX MED GY IP 250 OP 250 PS 637: Performed by: PHYSICIAN ASSISTANT

## 2023-05-09 PROCEDURE — 97530 THERAPEUTIC ACTIVITIES: CPT | Mod: GP

## 2023-05-09 PROCEDURE — 36415 COLL VENOUS BLD VENIPUNCTURE: CPT | Performed by: PHYSICIAN ASSISTANT

## 2023-05-09 PROCEDURE — 97116 GAIT TRAINING THERAPY: CPT | Mod: GP

## 2023-05-09 PROCEDURE — 97165 OT EVAL LOW COMPLEX 30 MIN: CPT | Mod: GO | Performed by: REHABILITATION PRACTITIONER

## 2023-05-09 PROCEDURE — 97535 SELF CARE MNGMENT TRAINING: CPT | Mod: GO | Performed by: REHABILITATION PRACTITIONER

## 2023-05-09 PROCEDURE — 85027 COMPLETE CBC AUTOMATED: CPT | Performed by: PHYSICIAN ASSISTANT

## 2023-05-09 PROCEDURE — 250N000013 HC RX MED GY IP 250 OP 250 PS 637: Performed by: ORTHOPAEDIC SURGERY

## 2023-05-09 RX ADMIN — ACETAMINOPHEN 975 MG: 325 TABLET ORAL at 01:03

## 2023-05-09 RX ADMIN — CELECOXIB 100 MG: 100 CAPSULE ORAL at 09:13

## 2023-05-09 RX ADMIN — ACETAMINOPHEN 975 MG: 325 TABLET ORAL at 09:13

## 2023-05-09 RX ADMIN — DOCUSATE SODIUM AND SENNOSIDES 1 TABLET: 8.6; 5 TABLET, FILM COATED ORAL at 09:13

## 2023-05-09 RX ADMIN — FAMOTIDINE 20 MG: 20 TABLET ORAL at 09:13

## 2023-05-09 ASSESSMENT — ACTIVITIES OF DAILY LIVING (ADL)
ADLS_ACUITY_SCORE: 23

## 2023-05-09 NOTE — PROGRESS NOTES
"Orthopedic Surgery  5/9/2023    S: Patient voices no complaints today.     O: Blood pressure 128/58, pulse 80, temperature 96.9  F (36.1  C), temperature source Temporal, resp. rate 15, height 1.702 m (5' 7\"), weight 109.4 kg (241 lb 1.6 oz), last menstrual period 09/11/2005, SpO2 97 %, not currently breastfeeding.  Lab Results   Component Value Date    HGB 13.8 02/20/2023    HGB 13.9 05/11/2021     Lab Results   Component Value Date    INR 0.90 10/31/2007        Neurovascularly intact.  Calves are negative bilaterally, both soft and nontender.  The wound is C/D/I.  The wound looks good with minimal erythema of the surrounding skin.    A: Ms. Terry is doing well status post Procedure(s):  Left total knee arthroplasty poly ethylene spacer exchange.    P: Continue physical therapy.   Anticoagulation with lovenox, home on ASA  Pain management  Discharge planning, likely home today    Prasad Valladares MD  [phone]    "

## 2023-05-09 NOTE — CONSULTS
Hospitalist consult received. Full chart review completed. Patient is s/p revision left TKA without complications. Patient is only on ASA 81 mg and vitamin D PTA, which can be resumed upon discharge and pending orthopedic surgery recommendation for DVT prophylaxis postoperatively. BP slightly elevated, monitor with pain control. Will hold off on formal consultation since no ongoing medical conditions needing management at this time. If this changes then please contact hospitalist team and patient can be seen at that time.    Rosanna Toscano PA-C

## 2023-05-09 NOTE — PLAN OF CARE
"Goal Outcome Evaluation:         Vital Signs: BP (!) 143/74 (BP Location: Right arm)   Pulse 79   Temp 97.9  F (36.6  C) (Temporal)   Resp 18   Ht 1.702 m (5' 7\")   Wt 109.4 kg (241 lb 1.6 oz)   LMP 09/11/2005   SpO2 96%   BMI 37.76 kg/m         Patient vital signs are at baseline: No,  Reason:  BP elevated  Patient able to ambulate as they were prior to admission or with assist devices provided by therapies during their stay:  Yes, up with assist of 1 GB/W  Patient MUST void prior to discharge:  Yes  Patient able to tolerate oral intake:  Yes, tolerating regular diet  Pt has adequate pain control using Oral analgesics:  Yes, pain managed with oxy 5 mg  Does patient have an identified :  Yes,   Has goal D/C date and time been discussed with patient:  Yes, Plan is home tomorrow        Pt A&O x4, VS stable except BP elevated. CMS intact, dressing CDI. Fluids running 100 mL/hr. Capno in place. Will continue to monitor.         "
Goal Outcome Evaluation:      Plan of Care Reviewed With: patient, spouse    Overall Patient Progress: improvingOverall Patient Progress: improving         Arrived to room (643) from PACU at (1044) via cart, transferred to bed via hover mat without difficulty, alert and oriented x 4, oriented to room and call system, dressing CDI, CMS intact, IV patent and infusing, rates pain (3)/10, SCD's on BLE. Rachael ice chips well. Frequent VS started.      Patient vital signs are at baseline: Yes  Patient able to ambulate as they were prior to admission or with assist devices provided by therapies during their stay:  No,  Reason:  1A WGB  Patient MUST void prior to discharge:  Yes  Patient able to tolerate oral intake:  Yes  Pain has adequate pain control using Oral analgesics:  Yes, scheduled tylenol and PRN oxy  Does patient have an identified :  Yes -  Fish  Has goal D/C date and time been discussed with patient:  Yes - Tomorrow morning discharge home  
Physical Therapy Discharge Summary    Reason for therapy discharge:    Discharged to home with outpatient therapy.    Progress towards therapy goal(s). See goals on Care Plan in [facility] electronic health record for goal details.  Goals partially met.  Barriers to achieving goals:   discharge from facility.    Therapy recommendation(s):    Continued therapy is recommended.  Rationale/Recommendations:  Outpatient PT per Ortho/MD/PA protocol.      
(0) independent

## 2023-05-09 NOTE — PROGRESS NOTES
05/09/23 0711   Appointment Info   Signing Clinician's Name / Credentials (OT) Kavita Weston, OTR/L   Living Environment   People in Home spouse   Current Living Arrangements house   Home Accessibility stairs to enter home;stairs within home   Number of Stairs, Main Entrance 3   Number of Stairs, Within Home, Primary greater than 10 stairs   Transportation Anticipated family or friend will provide   Living Environment Comments Pt reports 3 ALENA then all needs met. Does not need absement. Tub shower.   Self-Care   Usual Activity Tolerance good   Current Activity Tolerance moderate   Regular Exercise No   Equipment Currently Used at Home raised toilet seat   Fall history within last six months no   Activity/Exercise/Self-Care Comment Pt reports IND at baseline. Has crutches at home but no other AD, does not use.   Instrumental Activities of Daily Living (IADL)   IADL Comments spouse had RTC surgery in Feb, patient bry complete IADls as able and spouse will be A as well   General Information   Onset of Illness/Injury or Date of Surgery 05/08/23   Referring Physician Dr. Valladares   Patient/Family Therapy Goal Statement (OT) to return home   Additional Occupational Profile Info/Pertinent History of Current Problem Patient is POD #1 for L TKA   Existing Precautions/Restrictions fall   Left Lower Extremity (Weight-bearing Status) weight-bearing as tolerated (WBAT)   General Observations and Info patient was in bed and agreeable to OT session   Cognitive Status Examination   Orientation Status orientation to person, place and time   Visual Perception   Visual Impairment/Limitations corrective lenses for reading   Sensory   Sensory Quick Adds sensation intact   Pain Assessment   Patient Currently in Pain Yes, see Vital Sign flowsheet  (rating 2-3/10)   Posture   Posture not impaired   Range of Motion Comprehensive   General Range of Motion bilateral upper extremity ROM WNL   Strength Comprehensive (MMT)   Comment, General  Manual Muscle Testing (MMT) Assessment WFl iN B UE, L LE decreased strength to be expected   Coordination   Upper Extremity Coordination No deficits were identified   Bed Mobility   Comment (Bed Mobility) SBA for sit in bed to sit EOB, needed to use R foot under L ankle to transfer L LE OOB   Transfers   Transfer Comments SBA, fww sit<>stand   Balance   Balance Comments LOB was not noted, general unsteadiness to be expected   Activities of Daily Living   BADL Assessment/Intervention bathing;lower body dressing;toileting   Bathing Assessment/Intervention   [place] Level (Bathing) minimum assist (75% patient effort)   Lower Body Dressing Assessment/Training   [place] Level (Lower Body Dressing) supervision   Toileting   Comment, (Toileting) patient reports she has been completing toilet transfer with RN staff, declined need for further practice, demo's skills needed to complete with SBA.   [place] Level (Toileting) supervision   Clinical Impression   Criteria for Skilled Therapeutic Interventions Met (OT) Yes, treatment indicated   OT Diagnosis decreased ADL/IADls   OT Problem List-Impairments impacting ADL balance;activity tolerance impaired;pain;strength;range of motion (ROM)   Assessment of Occupational Performance 5 or more Performance Deficits   Identified Performance Deficits bathing, household/outdoor chores, drivng, errands   Planned Therapy Interventions (OT) ADL retraining;progressive activity/exercise;transfer training   Clinical Decision Making Complexity (OT) low complexity   Anticipated Equipment Needs Upon Discharge (OT)   (has gardeneing stool she uses in tub)   Risk & Benefits of therapy have been explained evaluation/treatment results reviewed;care plan/treatment goals reviewed;risks/benefits reviewed;participants voiced agreement with care plan;participants included;patient   OT Total Evaluation Time   OT Eval, Low Complexity Minutes (22383) 10   OT Goals   Therapy Frequency (OT) One  time eval and treatment   OT Predicted Duration/Target Date for Goal Attainment 05/09/23   OT Goals Transfers;OT Goal 1;OT Goal 2   OT: Transfer Supervision/stand-by assist;with assistive device;Goal Met  (tub)   OT: Goal 1 Patient will verbalize at least 2-3 adaptations to ADLs routines at home to accommodate energy level and safety needs.- goal met   OT: Goal 2 Patient will demonstrate safe use of walker during ADLs.- goal met   OT Discharge Planning   OT Plan dc   OT Rationale for DC Rec defer to ortho team for dc plan- patient has met needed goals for safe discharge home with family to A as needed with IADL's; household chores, driving, errands, cooking and bathing. Patient has all other needed AE. Will discharge from OT services.   OT Brief overview of current status SBA, bed mobility, transfers, basic ADLs   Total Session Time   Total Session Time (sum of timed and untimed services) 10

## 2023-05-09 NOTE — PROGRESS NOTES
Occupational Therapy Discharge Summary    Reason for therapy discharge:    All goals and outcomes met, no further needs identified.    Progress towards therapy goal(s). See goals on Care Plan in [facility] electronic health record for goal details.  Goals met    Therapy recommendation(s):    No further therapy is recommended.

## 2023-05-11 ENCOUNTER — TRANSCRIBE ORDERS (OUTPATIENT)
Dept: OTHER | Age: 68
End: 2023-05-11

## 2023-05-11 ENCOUNTER — THERAPY VISIT (OUTPATIENT)
Dept: PHYSICAL THERAPY | Facility: CLINIC | Age: 68
End: 2023-05-11
Payer: MEDICARE

## 2023-05-11 DIAGNOSIS — M25.562 ACUTE PAIN OF LEFT KNEE: ICD-10-CM

## 2023-05-11 DIAGNOSIS — Z47.89 AFTERCARE FOLLOWING SURGERY OF THE MUSCULOSKELETAL SYSTEM: ICD-10-CM

## 2023-05-11 DIAGNOSIS — M19.90 OSTEOARTHRITIS: ICD-10-CM

## 2023-05-11 DIAGNOSIS — Z47.89 AFTERCARE FOLLOWING SURGERY OF THE MUSCULOSKELETAL SYSTEM: Primary | ICD-10-CM

## 2023-05-11 DIAGNOSIS — Z96.652 AFTERCARE FOLLOWING LEFT KNEE JOINT REPLACEMENT SURGERY: Primary | ICD-10-CM

## 2023-05-11 DIAGNOSIS — Z47.1 AFTERCARE FOLLOWING LEFT KNEE JOINT REPLACEMENT SURGERY: Primary | ICD-10-CM

## 2023-05-11 PROCEDURE — 97161 PT EVAL LOW COMPLEX 20 MIN: CPT | Mod: GP | Performed by: PHYSICAL THERAPIST

## 2023-05-11 PROCEDURE — 97110 THERAPEUTIC EXERCISES: CPT | Mod: GP | Performed by: PHYSICAL THERAPIST

## 2023-05-11 ASSESSMENT — ACTIVITIES OF DAILY LIVING (ADL)
RISE FROM A CHAIR: ACTIVITY IS SOMEWHAT DIFFICULT
GIVING WAY, BUCKLING OR SHIFTING OF KNEE: THE SYMPTOM AFFECTS MY ACTIVITY MODERATELY
LIMPING: I DO NOT HAVE THE SYMPTOM
KNEE_ACTIVITY_OF_DAILY_LIVING_SCORE: 54.29
HOW_WOULD_YOU_RATE_THE_CURRENT_FUNCTION_OF_YOUR_KNEE_DURING_YOUR_USUAL_DAILY_ACTIVITIES_ON_A_SCALE_FROM_0_TO_100_WITH_100_BEING_YOUR_LEVEL_OF_KNEE_FUNCTION_PRIOR_TO_YOUR_INJURY_AND_0_BEING_THE_INABILITY_TO_PERFORM_ANY_OF_YOUR_USUAL_DAILY_ACTIVITIES?: 25
AS_A_RESULT_OF_YOUR_KNEE_INJURY,_HOW_WOULD_YOU_RATE_YOUR_CURRENT_LEVEL_OF_DAILY_ACTIVITY?: SEVERELY ABNORMAL
SQUAT: I AM UNABLE TO DO THE ACTIVITY
STIFFNESS: I DO NOT HAVE THE SYMPTOM
GO DOWN STAIRS: I AM UNABLE TO DO THE ACTIVITY
SWELLING: I DO NOT HAVE THE SYMPTOM
HOW_WOULD_YOU_RATE_THE_OVERALL_FUNCTION_OF_YOUR_KNEE_DURING_YOUR_USUAL_DAILY_ACTIVITIES?: SEVERELY ABNORMAL
KNEEL ON THE FRONT OF YOUR KNEE: I AM UNABLE TO DO THE ACTIVITY
SIT WITH YOUR KNEE BENT: ACTIVITY IS FAIRLY DIFFICULT
WALK: ACTIVITY IS SOMEWHAT DIFFICULT
PAIN: I DO NOT HAVE THE SYMPTOM
STAND: ACTIVITY IS SOMEWHAT DIFFICULT
RAW_SCORE: 38
GO UP STAIRS: I AM UNABLE TO DO THE ACTIVITY
KNEE_ACTIVITY_OF_DAILY_LIVING_SUM: 38
WEAKNESS: I DO NOT HAVE THE SYMPTOM

## 2023-05-11 NOTE — PROGRESS NOTES
[facility]    OUTPATIENT Physical Therapy ORTHOPEDIC EVALUATION  PLAN OF TREATMENT FOR OUTPATIENT REHABILITATION  (COMPLETE FOR INITIAL CLAIMS ONLY)  Patient's Last Name, First Name, M.I.  YOB: 1955  Rich Terry    Provider s Name:  [facility]   Medical Record No.  [id number]   Start of Care Date:  05/11/23   Onset Date:  05/08/23    Treatment Diagnosis:  s/p L poly exchange of left previous TKA Medical Diagnosis:  Data Unavailable       Goals:     05/11/23 0500   Body Part   Goals listed below are for s/p R poly exchange of L TKA   Goal #1   Goal #1 ambulation   Previous Functional Level No restrictions   Current Functional Level Minutes patient can walk   Performance Level 5 with walker   STG Target Performance Minutes patient will be able to walk   Performance Level 20 with cane   Rationale for safe household ambulation;for safe outdoor household ambulation;for safe community ambulation   Due Date 06/01/23    LTG Target Performance Minutes patient will be able to  walk   Performance Level 60   Rationale for safe household ambulation;for safe outdoor household ambulation;for safe community ambulation   Due Date 07/20/23         Therapy Frequency:  1 x week  Predicted Duration of Therapy Intervention:  10 weeks    Hudson Maradiaga, PT                 I CERTIFY THE NEED FOR THESE SERVICES FURNISHED UNDER        THIS PLAN OF TREATMENT AND WHILE UNDER MY CARE .             Physician Signature               Date    X_____________________________________________________                         Certification Date From:  05/11/23   Certification Date To:  07/20/23    Referring Provider:  Prasad Valladares    Initial Assessment        See Epic Evaluation SOC Date: 05/11/23

## 2023-05-11 NOTE — PROGRESS NOTES
Physical Therapy Initial Evaluation  Subjective:  The history is provided by the patient. No  was used.   Patient Health History  Rich Terry being seen for s/p L knee poly exchange of previous TKA .     Date of Onset: DOS 5/8/23,    Problem occurred: walking   Pain is reported as 8/10 on pain scale.  General health as reported by patient is good.  Pertinent medical history includes: overweight and chemical dependency.        Surgeries include:  Orthopedic surgery and other. Other surgery history details: R and L TKA, vein ablation.    Current medications:  Pain medication.    Current occupation is retired.                     Therapist Generated HPI Evaluation  Problem details: Pt. complains of L knee pian  that has been present since left knee poly exchange following previous TKA.   PT order dated 5/10/23.     .         Type of problem:  Left knee.    This is a new condition.    Where condition occurred: in the community.  Patient reports pain:  In the joint.  Pain is described as aching and is constant.  Pain radiates to:  Knee. Pain is the same all the time.  Since onset symptoms are unchanged.  Associated symptoms:  Loss of motion/stiffness and loss of strength. Symptoms are exacerbated by standing, walking and weight bearing        Barriers include:  None as reported by patient.                        Objective:    Gait:    Gait Type:  Antalgic   Weight Bearing Status:  WBAT   Assistive Devices:  Walker                                                        Knee Evaluation:  ROM:      PROM    Hyperextension: Left: 0   Right:   Extension: Left: 4   Right:   Flexion: Left: 47   Right:       Strength:         Quad Set Left: Poor and delayed    Pain:   Quad Set Right: Good    Pain:      Palpation:    Left knee tenderness present at:  Incisional    Edema:  Edema of the knee: substantial L LE swelling present,  was there before surgery dut to veneous insuffency prior to  surgery.            General     ROS    Assessment/Plan:    Patient is a 68 year old female with left side knee complaints.    Patient has the following significant findings with corresponding treatment plan.                Diagnosis 1:  S/p L poly exchange of previous TKA  Pain -  self management, education, directional preference exercise and home program  Decreased ROM/flexibility - manual therapy and therapeutic exercise  Decreased strength - therapeutic exercise and therapeutic activities  Decreased proprioception - neuro re-education and therapeutic activities  Impaired gait - gait training  Impaired muscle performance - neuro re-education  Decreased function - therapeutic activities    Therapy Evaluation Codes:   1) Clinical presentation characteristics are:   Stable/Uncomplicated.  2) Decision-Making    Low complexity using standardized patient assessment instrument and/or measureable assessment of functional outcome.  Cumulative Therapy Evaluation is: Low complexity.    Previous and current functional limitations:  (See Goal Flow Sheet for this information)    Short term and Long term goals: (See Goal Flow Sheet for this information)     Communication ability:  Patient appears to be able to clearly communicate and understand verbal and written communication and follow directions correctly.  Treatment Explanation - The following has been discussed with the patient:   RX ordered/plan of care  Anticipated outcomes  Possible risks and side effects  This patient would benefit from PT intervention to resume normal activities.   Rehab potential is fair.    Frequency:  1 X week, once daily  Duration:  for 10 weeks  Discharge Plan:  Achieve all LTG.  Independent in home treatment program.  Reach maximal therapeutic benefit.    Please refer to the daily flowsheet for treatment today, total treatment time and time spent performing 1:1 timed codes.

## 2023-05-13 LAB
BACTERIA TISS BX CULT: NO GROWTH
BACTERIA TISS BX CULT: NO GROWTH

## 2023-05-15 ENCOUNTER — THERAPY VISIT (OUTPATIENT)
Dept: PHYSICAL THERAPY | Facility: CLINIC | Age: 68
End: 2023-05-15
Payer: MEDICARE

## 2023-05-15 DIAGNOSIS — Z96.652 AFTERCARE FOLLOWING LEFT KNEE JOINT REPLACEMENT SURGERY: Primary | ICD-10-CM

## 2023-05-15 DIAGNOSIS — M25.562 ACUTE PAIN OF LEFT KNEE: ICD-10-CM

## 2023-05-15 DIAGNOSIS — Z47.1 AFTERCARE FOLLOWING LEFT KNEE JOINT REPLACEMENT SURGERY: Primary | ICD-10-CM

## 2023-05-15 LAB
BACTERIA TISS BX CULT: NORMAL
BACTERIA TISS BX CULT: NORMAL

## 2023-05-15 PROCEDURE — 97110 THERAPEUTIC EXERCISES: CPT | Mod: GP | Performed by: PHYSICAL THERAPIST

## 2023-05-15 PROCEDURE — 97112 NEUROMUSCULAR REEDUCATION: CPT | Mod: GP | Performed by: PHYSICAL THERAPIST

## 2023-05-18 ENCOUNTER — THERAPY VISIT (OUTPATIENT)
Dept: PHYSICAL THERAPY | Facility: CLINIC | Age: 68
End: 2023-05-18
Payer: MEDICARE

## 2023-05-18 DIAGNOSIS — Z47.1 AFTERCARE FOLLOWING LEFT KNEE JOINT REPLACEMENT SURGERY: Primary | ICD-10-CM

## 2023-05-18 DIAGNOSIS — Z96.652 AFTERCARE FOLLOWING LEFT KNEE JOINT REPLACEMENT SURGERY: Primary | ICD-10-CM

## 2023-05-18 DIAGNOSIS — M25.562 ACUTE PAIN OF LEFT KNEE: ICD-10-CM

## 2023-05-18 PROCEDURE — 97530 THERAPEUTIC ACTIVITIES: CPT | Mod: GP | Performed by: PHYSICAL THERAPIST

## 2023-05-18 PROCEDURE — 97110 THERAPEUTIC EXERCISES: CPT | Mod: GP | Performed by: PHYSICAL THERAPIST

## 2023-05-22 ENCOUNTER — THERAPY VISIT (OUTPATIENT)
Dept: PHYSICAL THERAPY | Facility: CLINIC | Age: 68
End: 2023-05-22
Payer: MEDICARE

## 2023-05-22 ENCOUNTER — TRANSFERRED RECORDS (OUTPATIENT)
Dept: FAMILY MEDICINE | Facility: CLINIC | Age: 68
End: 2023-05-22

## 2023-05-22 DIAGNOSIS — Z96.652 AFTERCARE FOLLOWING LEFT KNEE JOINT REPLACEMENT SURGERY: Primary | ICD-10-CM

## 2023-05-22 DIAGNOSIS — M25.562 ACUTE PAIN OF LEFT KNEE: ICD-10-CM

## 2023-05-22 DIAGNOSIS — Z47.1 AFTERCARE FOLLOWING LEFT KNEE JOINT REPLACEMENT SURGERY: Primary | ICD-10-CM

## 2023-05-22 LAB — BACTERIA ASPIRATE CULT: NORMAL

## 2023-05-22 PROCEDURE — 97530 THERAPEUTIC ACTIVITIES: CPT | Mod: GP | Performed by: PHYSICAL THERAPIST

## 2023-05-22 PROCEDURE — 97110 THERAPEUTIC EXERCISES: CPT | Mod: GP | Performed by: PHYSICAL THERAPIST

## 2023-05-23 NOTE — DISCHARGE SUMMARY
"Discharge Summary    Rich Terry MRN# [id number]   YOB: 1955 Age: 68 year old     Date of Admission: 5/8/2023    Date of Discharge: 5/9/2023    Reason for Admission: Rich Terry is an 68 year old female who was admitted to the hospital following surgery with Dr. Prasad Valladares.    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 Terry was admitted and underwent the above procedure. The patient tolerated the procedure well. There were no complications. Following surgery she was admitted to the floor.  During her stay she did not require any blood transfusions.  Her last hemoglobin was noted to be   Lab Results   Component Value Date    HGB 11.5 05/09/2023    HGB 13.9 05/11/2021   .  Her pain was controlled with oral pain medication.  During her stay she progressed well in physical therapy and all the therapy goals were met.     Discharge Physical Exam:  /70 (BP Location: Left arm)   Pulse 91   Temp 97.5  F (36.4  C) (Temporal)   Resp 16   Ht 1.702 m (5' 7\")   Wt 109.4 kg (241 lb 1.6 oz)   LMP 09/11/2005   SpO2 96%   BMI 37.76 kg/m    Neurovascularly intact, distal pulses present bilaterally.  Calves are negative bilaterally, both soft and nontender.  The wound looks good with minimal erythema of the surrounding skin.    Assessment: Ms. Terry is stable and doing well status post polyethylene exchange of left total knee arthroplasty on POD #1.    Plan: We will discharge her home on analgesics and deep venous thrombosis prophylaxis.  She will follow-up with Maryanne Hassan PA-C or Dr. Prasad Valladares approximately 2 weeks from surgery.  She may call [phone] to schedule an appointment.    Discharge Instructions:  Discharge diet: Regular   Discharge activity: Weight bear as " tolerated.  Advance from the walker to a cane as tolerated.  Discontinue the use of cane when comfortable.   Physical therapy: Work on therapy exercises given in the hospital.     Discharge follow-up: Follow up with Maryanne Hassan PA-C in 12-16 days.   Anticoagulation Asprin 325 mg twice daily for 5 weeks.   Wound care: Leave aquacel dressing in place until post-op follow-up.  If it becomes loose or soiled then remove and replace with daily dry dressings.  Keep wound clean and dry.  May get incision area wet in shower but do not soak or scrub.         Meds:     Medication List      Started    acetaminophen 325 MG tablet  Commonly known as: TYLENOL  650 mg, Oral, EVERY 4 HOURS PRN     aspirin 325 MG EC tablet  Commonly known as: ASA  325 mg, Oral, DAILY  Replaces: aspirin 81 MG chewable tablet     oxyCODONE 5 MG tablet  Commonly known as: ROXICODONE  5-10 mg, Oral, EVERY 4 HOURS PRN     senna-docusate 8.6-50 MG tablet  Commonly known as: SENOKOT-S/PERICOLACE  1-2 tablets, Oral, 2 TIMES DAILY, Take while on oral narcotics to prevent or treat constipation.        Discontinued    aspirin 81 MG chewable tablet  Commonly known as: ASA  Replaced by: aspirin 325 MG EC tablet                Maryanne Hassan PA-C  O: [phone]

## 2023-05-24 ENCOUNTER — HOSPITAL ENCOUNTER (OUTPATIENT)
Dept: ULTRASOUND IMAGING | Facility: CLINIC | Age: 68
Discharge: HOME | End: 2023-05-24
Attending: ORTHOPAEDIC SURGERY | Admitting: ORTHOPAEDIC SURGERY
Payer: MEDICARE

## 2023-05-24 DIAGNOSIS — I82.4Y2 DEEP VEIN THROMBOSIS (DVT) OF PROXIMAL VEIN OF LEFT LOWER EXTREMITY, UNSPECIFIED CHRONICITY (H): ICD-10-CM

## 2023-05-24 DIAGNOSIS — Z47.89 AFTERCARE FOLLOWING SURGERY OF THE MUSCULOSKELETAL SYSTEM: ICD-10-CM

## 2023-05-24 DIAGNOSIS — M79.662 PAIN IN LEFT LOWER LEG: ICD-10-CM

## 2023-05-24 LAB — ORGANISM (ARUP): ABNORMAL

## 2023-05-24 PROCEDURE — 93971 EXTREMITY STUDY: CPT | Mod: LT

## 2023-05-25 ENCOUNTER — THERAPY VISIT (OUTPATIENT)
Dept: PHYSICAL THERAPY | Facility: CLINIC | Age: 68
End: 2023-05-25
Payer: MEDICARE

## 2023-05-25 DIAGNOSIS — Z96.652 AFTERCARE FOLLOWING LEFT KNEE JOINT REPLACEMENT SURGERY: Primary | ICD-10-CM

## 2023-05-25 DIAGNOSIS — M25.562 ACUTE PAIN OF LEFT KNEE: ICD-10-CM

## 2023-05-25 DIAGNOSIS — Z47.1 AFTERCARE FOLLOWING LEFT KNEE JOINT REPLACEMENT SURGERY: Primary | ICD-10-CM

## 2023-05-25 PROCEDURE — 97110 THERAPEUTIC EXERCISES: CPT | Mod: GP | Performed by: PHYSICAL THERAPIST

## 2023-05-26 LAB — BACTERIA SNV CULT: ABNORMAL

## 2023-05-30 ENCOUNTER — THERAPY VISIT (OUTPATIENT)
Dept: PHYSICAL THERAPY | Facility: CLINIC | Age: 68
End: 2023-05-30
Payer: MEDICARE

## 2023-05-30 DIAGNOSIS — Z47.1 AFTERCARE FOLLOWING LEFT KNEE JOINT REPLACEMENT SURGERY: Primary | ICD-10-CM

## 2023-05-30 DIAGNOSIS — Z96.652 AFTERCARE FOLLOWING LEFT KNEE JOINT REPLACEMENT SURGERY: Primary | ICD-10-CM

## 2023-05-30 DIAGNOSIS — M25.562 ACUTE PAIN OF LEFT KNEE: ICD-10-CM

## 2023-05-30 PROCEDURE — 97110 THERAPEUTIC EXERCISES: CPT | Mod: GP | Performed by: PHYSICAL THERAPIST

## 2023-06-02 ENCOUNTER — THERAPY VISIT (OUTPATIENT)
Dept: PHYSICAL THERAPY | Facility: CLINIC | Age: 68
End: 2023-06-02
Payer: MEDICARE

## 2023-06-02 DIAGNOSIS — M25.562 ACUTE PAIN OF LEFT KNEE: ICD-10-CM

## 2023-06-02 DIAGNOSIS — Z47.1 AFTERCARE FOLLOWING LEFT KNEE JOINT REPLACEMENT SURGERY: Primary | ICD-10-CM

## 2023-06-02 DIAGNOSIS — Z96.652 AFTERCARE FOLLOWING LEFT KNEE JOINT REPLACEMENT SURGERY: Primary | ICD-10-CM

## 2023-06-02 PROCEDURE — 97110 THERAPEUTIC EXERCISES: CPT | Mod: GP | Performed by: PHYSICAL THERAPIST

## 2023-06-05 ENCOUNTER — THERAPY VISIT (OUTPATIENT)
Dept: PHYSICAL THERAPY | Facility: CLINIC | Age: 68
End: 2023-06-05
Payer: MEDICARE

## 2023-06-05 DIAGNOSIS — Z47.1 AFTERCARE FOLLOWING LEFT KNEE JOINT REPLACEMENT SURGERY: Primary | ICD-10-CM

## 2023-06-05 DIAGNOSIS — M25.562 ACUTE PAIN OF LEFT KNEE: ICD-10-CM

## 2023-06-05 DIAGNOSIS — Z96.652 AFTERCARE FOLLOWING LEFT KNEE JOINT REPLACEMENT SURGERY: Primary | ICD-10-CM

## 2023-06-05 PROCEDURE — 97110 THERAPEUTIC EXERCISES: CPT | Mod: GP | Performed by: PHYSICAL THERAPIST

## 2023-06-08 ENCOUNTER — THERAPY VISIT (OUTPATIENT)
Dept: PHYSICAL THERAPY | Facility: CLINIC | Age: 68
End: 2023-06-08
Payer: MEDICARE

## 2023-06-08 DIAGNOSIS — M25.562 ACUTE PAIN OF LEFT KNEE: ICD-10-CM

## 2023-06-08 DIAGNOSIS — Z47.1 AFTERCARE FOLLOWING LEFT KNEE JOINT REPLACEMENT SURGERY: Primary | ICD-10-CM

## 2023-06-08 DIAGNOSIS — Z96.652 AFTERCARE FOLLOWING LEFT KNEE JOINT REPLACEMENT SURGERY: Primary | ICD-10-CM

## 2023-06-08 PROCEDURE — 97110 THERAPEUTIC EXERCISES: CPT | Mod: GP | Performed by: PHYSICAL THERAPIST

## 2023-06-16 ENCOUNTER — THERAPY VISIT (OUTPATIENT)
Dept: PHYSICAL THERAPY | Facility: CLINIC | Age: 68
End: 2023-06-16
Payer: MEDICARE

## 2023-06-16 DIAGNOSIS — Z47.1 AFTERCARE FOLLOWING LEFT KNEE JOINT REPLACEMENT SURGERY: Primary | ICD-10-CM

## 2023-06-16 DIAGNOSIS — M25.562 ACUTE PAIN OF LEFT KNEE: ICD-10-CM

## 2023-06-16 DIAGNOSIS — Z96.652 AFTERCARE FOLLOWING LEFT KNEE JOINT REPLACEMENT SURGERY: Primary | ICD-10-CM

## 2023-06-16 PROCEDURE — 97110 THERAPEUTIC EXERCISES: CPT | Mod: GP | Performed by: PHYSICAL THERAPIST

## 2023-06-16 ASSESSMENT — ACTIVITIES OF DAILY LIVING (ADL)
STIFFNESS: THE SYMPTOM AFFECTS MY ACTIVITY SLIGHTLY
PAIN: THE SYMPTOM AFFECTS MY ACTIVITY SLIGHTLY
HOW_WOULD_YOU_RATE_THE_OVERALL_FUNCTION_OF_YOUR_KNEE_DURING_YOUR_USUAL_DAILY_ACTIVITIES?: ABNORMAL
KNEEL ON THE FRONT OF YOUR KNEE: I AM UNABLE TO DO THE ACTIVITY
LIMPING: I HAVE THE SYMPTOM BUT IT DOES NOT AFFECT MY ACTIVITY
RISE FROM A CHAIR: ACTIVITY IS NOT DIFFICULT
WEAKNESS: I HAVE THE SYMPTOM BUT IT DOES NOT AFFECT MY ACTIVITY
GIVING WAY, BUCKLING OR SHIFTING OF KNEE: I DO NOT HAVE THE SYMPTOM
STAND: ACTIVITY IS NOT DIFFICULT
GO DOWN STAIRS: ACTIVITY IS VERY DIFFICULT
GO UP STAIRS: ACTIVITY IS MINIMALLY DIFFICULT
RAW_SCORE: 50
KNEE_ACTIVITY_OF_DAILY_LIVING_SUM: 50
SWELLING: THE SYMPTOM AFFECTS MY ACTIVITY SLIGHTLY
WALK: ACTIVITY IS NOT DIFFICULT
HOW_WOULD_YOU_RATE_THE_CURRENT_FUNCTION_OF_YOUR_KNEE_DURING_YOUR_USUAL_DAILY_ACTIVITIES_ON_A_SCALE_FROM_0_TO_100_WITH_100_BEING_YOUR_LEVEL_OF_KNEE_FUNCTION_PRIOR_TO_YOUR_INJURY_AND_0_BEING_THE_INABILITY_TO_PERFORM_ANY_OF_YOUR_USUAL_DAILY_ACTIVITIES?: 80
AS_A_RESULT_OF_YOUR_KNEE_INJURY,_HOW_WOULD_YOU_RATE_YOUR_CURRENT_LEVEL_OF_DAILY_ACTIVITY?: ABNORMAL
SIT WITH YOUR KNEE BENT: ACTIVITY IS MINIMALLY DIFFICULT
KNEE_ACTIVITY_OF_DAILY_LIVING_SCORE: 71.43
SQUAT: ACTIVITY IS MINIMALLY DIFFICULT

## 2023-06-19 ENCOUNTER — TRANSFERRED RECORDS (OUTPATIENT)
Dept: HEALTH INFORMATION MANAGEMENT | Facility: CLINIC | Age: 68
End: 2023-06-19
Payer: MEDICARE

## 2023-08-28 NOTE — PROGRESS NOTES
HISTORY OF PRESENT ILLNESS:    This is a 68 year old female who follows with Dr Wilson at [facility]  Her past medical history includes:  Right bundle branch block, obesity, coronary artery disease, hyperlipidemia    Ms Terry has a history of an abnormal stress test (2006) demonstrating a moderate-sized anterior defect concerning for ischemia, but was having atypical symptoms and it was felt that her body habitus skewed the results  A CT angiogram was recommended, but not done at that time.    She was seen in clinic (3/2023) for preoperative clearance for knee surgery  A CT angiogram and ECHO were then arranged    CTA (3/17/23) showed mild nonobstructive coronary artery disease  Calcium score: 302    ECHO (3/27/23) showed LVEF 55-60%, normal RV function, no significant valvular pathology, mild ascending aorta dilatation    She was cleared for surgery which was uneventful (5/8/23)    Our visit today is for reassessment     Ms Terry states that her knee revision surgery went well  She is still having some ongoing leg/knee swelling and is not back to her walking ability yet.  She is trying to lose weight and is down 10 lbs   She denies any chest pain, shortness of breath, palpitations, or orthopnea        VITAL SIGNS:  BP:  134/78  Pulse:  75  Weight: 232 lbs  BMI: 36      IMPRESSION AND PLAN:    Coronary Artery Disease:  -mild per CTA (3/2023)  Calcium score: 302  -no angina  -recommend initiating statin.  Will check lipids today and then initiate statin.  We will then arrange follow up lipids in 3 months    Chronic Right bundle branch block    The total time for the visit today was 30 minutes which includes patient visit, reviewing of records, discussion, and placing of orders of the outpatient coordination of cardiovascular care as described.  The level of medical decision making during this visit was of mild-moderate complexity.  Thank you for allowing me to participate in their care.     Orders  Placed This Encounter   Procedures    Lipid Profile    ALT    Lipid Profile    ALT    Follow-Up with Cardiology       No orders of the defined types were placed in this encounter.      There are no discontinued medications.      Encounter Diagnosis   Name Primary?    Coronary artery disease involving native coronary artery of native heart without angina pectoris        CURRENT MEDICATIONS:  Current Outpatient Medications   Medication Sig Dispense Refill    acetaminophen (TYLENOL) 325 MG tablet Take 2 tablets (650 mg) by mouth every 4 hours as needed for other (mild pain) 100 tablet 0    aspirin (ASA) 325 MG EC tablet Take 1 tablet (325 mg) by mouth daily 70 tablet 0    oxyCODONE (ROXICODONE) 5 MG tablet Take 1-2 tablets (5-10 mg) by mouth every 4 hours as needed for moderate to severe pain 25 tablet 0    senna-docusate (SENOKOT-S/PERICOLACE) 8.6-50 MG tablet Take 1-2 tablets by mouth 2 times daily Take while on oral narcotics to prevent or treat constipation. 30 tablet 0    VITAMIN D PO Take by mouth daily         ALLERGIES     Allergies   Allergen Reactions    Levaquin Muscle Pain (Myalgia)    Shrimp      throat swells    Sulfa Antibiotics Rash     rash       PAST MEDICAL HISTORY:  Past Medical History:   Diagnosis Date    Generalized osteoarthrosis, unspecified site     Pain in right knee     Phlebitis and thrombophlebitis of superficial vessels of lower extremities     several episodes    Thrombosis of leg     superficial, not deep    Varicose veins of lower extremities with ulcer (H)        PAST SURGICAL HISTORY:  Past Surgical History:   Procedure Laterality Date    ARTHROPLASTY KNEE Right 11/2/2015    Procedure: ARTHROPLASTY KNEE;  Surgeon: Prasad Valladares MD;  Location: [facility] OR    EXCHANGE POLY COMPONENT ARTHROPLASTY KNEE Left 5/8/2023    Procedure: Polyethylene exchange of left total knee arthroplasty using Dayanna NexGen LPS knee;  Surgeon: Prasad Valladares MD;  Location: [facility] OR    saphenous  vein radiofrequency ablation[  2012    SURGICAL HISTORY OF -       rt LE varicose vein procedure    SURGICAL HISTORY OF -   05    Tibial osteotomy (lt leg)    varicose vein avulsions/stab phlebectomy[  2012    [facility] NONSPECIFIC PROCEDURE      4 knee surgeries (all left knee)    [facility] NONSPECIFIC PROCEDURE      (L) planter fascia surgery       FAMILY HISTORY:  Family History   Problem Relation Age of Onset    Cancer Mother         cervical ca age 36    Heart Disease Mother         A-FIB    Skin Cancer Mother         on nose    Heart Disease Father          CHF age 51    Diabetes Maternal Grandfather     Cancer Maternal Grandmother         uterine ca dx in her 60's    Diabetes Paternal Grandfather     Hypertension Brother     Cancer Maternal Aunt         ovarian ca in her 40's    Diabetes Paternal Uncle         multiple Puncles with DM       SOCIAL HISTORY:  Social History     Socioeconomic History    Marital status:      Spouse name: None    Number of children: None    Years of education: None    Highest education level: None   Tobacco Use    Smoking status: Former     Packs/day: 1.00     Years: 10.00     Pack years: 10.00     Types: Cigarettes     Start date:      Quit date:      Years since quittin.6    Smokeless tobacco: Never   Vaping Use    Vaping Use: Never used   Substance and Sexual Activity    Alcohol use: Yes     Comment: 1-2 drinks a month    Drug use: No    Sexual activity: Yes     Partners: Male   Other Topics Concern    Parent/sibling w/ CABG, MI or angioplasty before 65F 55M? Yes       Review of Systems:  Skin:  not assessed       Eyes:  Positive for glasses    ENT:  not assessed      Respiratory:  Positive for shortness of breath;dyspnea on exertion occ SOB w/exertion   Cardiovascular:    edema;Positive for legs always have edema mainly left leg  Gastroenterology: not assessed      Genitourinary:  not assessed      Musculoskeletal:  not assessed      Neurologic:   "not assessed      Psychiatric:  not assessed      Heme/Lymph/Imm:  not assessed      Endocrine:  not assessed        Physical Exam:  Vitals: /78 (BP Location: Right arm, Patient Position: Sitting, Cuff Size: Adult Regular)   Pulse 75   Ht 1.702 m (5' 7\")   Wt 105.3 kg (232 lb 1.6 oz)   LMP 09/11/2005   SpO2 97%   BMI 36.35 kg/m      Constitutional:  cooperative obese      Skin:  warm and dry to the touch          Head:  normocephalic        Eyes:  pupils equal and round        Lymph:      ENT:  no pallor or cyanosis        Neck:  no carotid bruit        Respiratory:  clear to auscultation;normal respiratory excursion         Cardiac: regular rhythm;normal S1 and S2     no presence of murmur          pulses full and equal                                        GI:    obese      Extremities and Muscular Skeletal:          LLE edema;trace;1+      Neurological:  affect appropriate        Psych:  Alert and Oriented x 3          CC  Teodoro Wilson MD  [address]  [place] [address]                    "

## 2023-08-30 ENCOUNTER — OFFICE VISIT (OUTPATIENT)
Dept: CARDIOLOGY | Facility: CLINIC | Age: 68
End: 2023-08-30
Attending: INTERNAL MEDICINE
Payer: MEDICARE

## 2023-08-30 ENCOUNTER — TELEPHONE (OUTPATIENT)
Dept: CARDIOLOGY | Facility: CLINIC | Age: 68
End: 2023-08-30

## 2023-08-30 ENCOUNTER — LAB (OUTPATIENT)
Dept: LAB | Facility: CLINIC | Age: 68
End: 2023-08-30
Payer: MEDICARE

## 2023-08-30 VITALS
WEIGHT: 232.1 LBS | HEIGHT: 67 IN | SYSTOLIC BLOOD PRESSURE: 134 MMHG | HEART RATE: 75 BPM | OXYGEN SATURATION: 97 % | DIASTOLIC BLOOD PRESSURE: 78 MMHG | BODY MASS INDEX: 36.43 KG/M2

## 2023-08-30 DIAGNOSIS — I25.10 CORONARY ARTERY DISEASE INVOLVING NATIVE CORONARY ARTERY OF NATIVE HEART WITHOUT ANGINA PECTORIS: Primary | ICD-10-CM

## 2023-08-30 DIAGNOSIS — I25.10 CORONARY ARTERY DISEASE INVOLVING NATIVE CORONARY ARTERY OF NATIVE HEART WITHOUT ANGINA PECTORIS: ICD-10-CM

## 2023-08-30 DIAGNOSIS — E78.5 HYPERLIPIDEMIA LDL GOAL <70: ICD-10-CM

## 2023-08-30 LAB
ALT SERPL W P-5'-P-CCNC: 17 U/L (ref 0–50)
CHOLEST SERPL-MCNC: 405 MG/DL
HDLC SERPL-MCNC: 84 MG/DL
LDLC SERPL CALC-MCNC: 301 MG/DL
NONHDLC SERPL-MCNC: 321 MG/DL
TRIGL SERPL-MCNC: 99 MG/DL

## 2023-08-30 PROCEDURE — 99214 OFFICE O/P EST MOD 30 MIN: CPT | Performed by: NURSE PRACTITIONER

## 2023-08-30 PROCEDURE — 80061 LIPID PANEL: CPT | Performed by: NURSE PRACTITIONER

## 2023-08-30 PROCEDURE — 84460 ALANINE AMINO (ALT) (SGPT): CPT | Performed by: NURSE PRACTITIONER

## 2023-08-30 PROCEDURE — 36415 COLL VENOUS BLD VENIPUNCTURE: CPT | Performed by: NURSE PRACTITIONER

## 2023-08-30 RX ORDER — ROSUVASTATIN CALCIUM 40 MG/1
40 TABLET, COATED ORAL DAILY
Qty: 90 TABLET | Refills: 3 | Status: SHIPPED | OUTPATIENT
Start: 2023-08-30 | End: 2024-08-23

## 2023-08-30 NOTE — LETTER
8/30/2023    Ragini Stevens MD  [id number]  [facility] [address]    RE: Rich FELICITA Terry       Dear Colleague,     I had the pleasure of seeing Rich Terry in the [facility].  HISTORY OF PRESENT ILLNESS:    This is a 68 year old female who follows with Dr Wilson at [facility]  Her past medical history includes:  Right bundle branch block, obesity, coronary artery disease, hyperlipidemia    Ms Terry has a history of an abnormal stress test (2006) demonstrating a moderate-sized anterior defect concerning for ischemia, but was having atypical symptoms and it was felt that her body habitus skewed the results  A CT angiogram was recommended, but not done at that time.    She was seen in clinic (3/2023) for preoperative clearance for knee surgery  A CT angiogram and ECHO were then arranged    CTA (3/17/23) showed mild nonobstructive coronary artery disease  Calcium score: 302    ECHO (3/27/23) showed LVEF 55-60%, normal RV function, no significant valvular pathology, mild ascending aorta dilatation    She was cleared for surgery which was uneventful (5/8/23)    Our visit today is for reassessment     Ms Terry states that her knee revision surgery went well  She is still having some ongoing leg/knee swelling and is not back to her walking ability yet.  She is trying to lose weight and is down 10 lbs   She denies any chest pain, shortness of breath, palpitations, or orthopnea        VITAL SIGNS:  BP:  134/78  Pulse:  75  Weight: 232 lbs  BMI: 36      IMPRESSION AND PLAN:    Coronary Artery Disease:  -mild per CTA (3/2023)  Calcium score: 302  -no angina  -recommend initiating statin.  Will check lipids today and then initiate statin.  We will then arrange follow up lipids in 3 months    Chronic Right bundle branch block    The total time for the visit today was 30 minutes which includes patient visit, reviewing of records, discussion, and placing of orders of the outpatient  coordination of cardiovascular care as described.  The level of medical decision making during this visit was of mild-moderate complexity.  Thank you for allowing me to participate in their care.     Orders Placed This Encounter   Procedures    Lipid Profile    ALT    Lipid Profile    ALT    Follow-Up with Cardiology       No orders of the defined types were placed in this encounter.      There are no discontinued medications.      Encounter Diagnosis   Name Primary?    Coronary artery disease involving native coronary artery of native heart without angina pectoris        CURRENT MEDICATIONS:  Current Outpatient Medications   Medication Sig Dispense Refill    acetaminophen (TYLENOL) 325 MG tablet Take 2 tablets (650 mg) by mouth every 4 hours as needed for other (mild pain) 100 tablet 0    aspirin (ASA) 325 MG EC tablet Take 1 tablet (325 mg) by mouth daily 70 tablet 0    oxyCODONE (ROXICODONE) 5 MG tablet Take 1-2 tablets (5-10 mg) by mouth every 4 hours as needed for moderate to severe pain 25 tablet 0    senna-docusate (SENOKOT-S/PERICOLACE) 8.6-50 MG tablet Take 1-2 tablets by mouth 2 times daily Take while on oral narcotics to prevent or treat constipation. 30 tablet 0    VITAMIN D PO Take by mouth daily         ALLERGIES     Allergies   Allergen Reactions    Levaquin Muscle Pain (Myalgia)    Shrimp      throat swells    Sulfa Antibiotics Rash     rash       PAST MEDICAL HISTORY:  Past Medical History:   Diagnosis Date    Generalized osteoarthrosis, unspecified site     Pain in right knee     Phlebitis and thrombophlebitis of superficial vessels of lower extremities     several episodes    Thrombosis of leg     superficial, not deep    Varicose veins of lower extremities with ulcer (H)        PAST SURGICAL HISTORY:  Past Surgical History:   Procedure Laterality Date    ARTHROPLASTY KNEE Right 11/2/2015    Procedure: ARTHROPLASTY KNEE;  Surgeon: Prasad Valladares MD;  Location: [facility] OR    EXCHANGE POLY  COMPONENT ARTHROPLASTY KNEE Left 2023    Procedure: Polyethylene exchange of left total knee arthroplasty using Dayanna NexGen LPS knee;  Surgeon: Prasad Valladares MD;  Location: [facility] OR    saphenous vein radiofrequency ablation[  2012    SURGICAL HISTORY OF -       rt LE varicose vein procedure    SURGICAL HISTORY OF -   05    Tibial osteotomy (lt leg)    varicose vein avulsions/stab phlebectomy[  2012    [facility] NONSPECIFIC PROCEDURE      4 knee surgeries (all left knee)    [facility] NONSPECIFIC PROCEDURE      (L) planter fascia surgery       FAMILY HISTORY:  Family History   Problem Relation Age of Onset    Cancer Mother         cervical ca age 36    Heart Disease Mother         A-FIB    Skin Cancer Mother         on nose    Heart Disease Father          CHF age 51    Diabetes Maternal Grandfather     Cancer Maternal Grandmother         uterine ca dx in her 60's    Diabetes Paternal Grandfather     Hypertension Brother     Cancer Maternal Aunt         ovarian ca in her 40's    Diabetes Paternal Uncle         multiple Puncles with DM       SOCIAL HISTORY:  Social History     Socioeconomic History    Marital status:      Spouse name: None    Number of children: None    Years of education: None    Highest education level: None   Tobacco Use    Smoking status: Former     Packs/day: 1.00     Years: 10.00     Pack years: 10.00     Types: Cigarettes     Start date:      Quit date:      Years since quittin.6    Smokeless tobacco: Never   Vaping Use    Vaping Use: Never used   Substance and Sexual Activity    Alcohol use: Yes     Comment: 1-2 drinks a month    Drug use: No    Sexual activity: Yes     Partners: Male   Other Topics Concern    Parent/sibling w/ CABG, MI or angioplasty before 65F 55M? Yes       Review of Systems:  Skin:  not assessed       Eyes:  Positive for glasses    ENT:  not assessed      Respiratory:  Positive for shortness of breath;dyspnea on exertion occ SOB  "w/exertion   Cardiovascular:    edema;Positive for legs always have edema mainly left leg  Gastroenterology: not assessed      Genitourinary:  not assessed      Musculoskeletal:  not assessed      Neurologic:  not assessed      Psychiatric:  not assessed      Heme/Lymph/Imm:  not assessed      Endocrine:  not assessed        Physical Exam:  Vitals: /78 (BP Location: Right arm, Patient Position: Sitting, Cuff Size: Adult Regular)   Pulse 75   Ht 1.702 m (5' 7\")   Wt 105.3 kg (232 lb 1.6 oz)   LMP 09/11/2005   SpO2 97%   BMI 36.35 kg/m      Constitutional:  cooperative obese      Skin:  warm and dry to the touch          Head:  normocephalic        Eyes:  pupils equal and round        Lymph:      ENT:  no pallor or cyanosis        Neck:  no carotid bruit        Respiratory:  clear to auscultation;normal respiratory excursion         Cardiac: regular rhythm;normal S1 and S2     no presence of murmur          pulses full and equal                                        GI:    obese      Extremities and Muscular Skeletal:          LLE edema;trace;1+      Neurological:  affect appropriate        Psych:  Alert and Oriented x 3          CC  Teodoro Wilson MD  [address]  [place] [address]                      Thank you for allowing me to participate in the care of your patient.      Sincerely,     Halle Callaway, DUTCH CNP     [facility]  "

## 2023-08-30 NOTE — PATIENT INSTRUCTIONS
Get labs today and we will be in touch with you about starting a statin  Return in 3 months with fasting labs    It was a pleasure seeing you today     Please do not hesitate to call my nurse team with any questions or concerns:  [phone]    Scheduling number:  [phone]    DUTCH Schwartz, CNP

## 2023-08-31 NOTE — TELEPHONE ENCOUNTER
Quite elevated cholesterol  Recommend starting Crestor 40 mg/day and repeating lipids in 2 months  Pls review low cholesterol diet  Thanks MIGEL       Patient notified of results, low cholesterol diet and will repeat labs prior to next visit in December.  Patient verbalized understanding.

## 2023-09-18 ENCOUNTER — APPOINTMENT (OUTPATIENT)
Dept: GENERAL RADIOLOGY | Facility: CLINIC | Age: 68
End: 2023-09-18
Attending: EMERGENCY MEDICINE
Payer: MEDICARE

## 2023-09-18 ENCOUNTER — HOSPITAL ENCOUNTER (EMERGENCY)
Facility: CLINIC | Age: 68
Discharge: HOME | End: 2023-09-18
Attending: EMERGENCY MEDICINE | Admitting: EMERGENCY MEDICINE
Payer: MEDICARE

## 2023-09-18 VITALS
TEMPERATURE: 99.7 F | DIASTOLIC BLOOD PRESSURE: 84 MMHG | SYSTOLIC BLOOD PRESSURE: 138 MMHG | BODY MASS INDEX: 34.86 KG/M2 | RESPIRATION RATE: 20 BRPM | WEIGHT: 230 LBS | HEIGHT: 68 IN | OXYGEN SATURATION: 94 % | HEART RATE: 106 BPM

## 2023-09-18 DIAGNOSIS — R51.9 NONINTRACTABLE HEADACHE, UNSPECIFIED CHRONICITY PATTERN, UNSPECIFIED HEADACHE TYPE: ICD-10-CM

## 2023-09-18 DIAGNOSIS — H91.92 HEARING LOSS OF LEFT EAR, UNSPECIFIED HEARING LOSS TYPE: ICD-10-CM

## 2023-09-18 DIAGNOSIS — U07.1 COVID-19: ICD-10-CM

## 2023-09-18 DIAGNOSIS — H61.22 IMPACTED CERUMEN OF LEFT EAR: ICD-10-CM

## 2023-09-18 LAB
ALBUMIN SERPL BCG-MCNC: 4 G/DL (ref 3.5–5.2)
ALP SERPL-CCNC: 97 U/L (ref 35–104)
ALT SERPL W P-5'-P-CCNC: 21 U/L (ref 0–50)
ANION GAP SERPL CALCULATED.3IONS-SCNC: 12 MMOL/L (ref 7–15)
AST SERPL W P-5'-P-CCNC: 37 U/L (ref 0–45)
BASE EXCESS BLDV CALC-SCNC: 3.8 MMOL/L (ref -7.7–1.9)
BASOPHILS # BLD AUTO: 0 10E3/UL (ref 0–0.2)
BASOPHILS NFR BLD AUTO: 1 %
BILIRUB SERPL-MCNC: 0.2 MG/DL
BUN SERPL-MCNC: 8.6 MG/DL (ref 8–23)
CALCIUM SERPL-MCNC: 9.5 MG/DL (ref 8.8–10.2)
CHLORIDE SERPL-SCNC: 97 MMOL/L (ref 98–107)
CREAT SERPL-MCNC: 0.96 MG/DL (ref 0.51–0.95)
DEPRECATED HCO3 PLAS-SCNC: 26 MMOL/L (ref 22–29)
EGFRCR SERPLBLD CKD-EPI 2021: 64 ML/MIN/1.73M2
EOSINOPHIL # BLD AUTO: 0.1 10E3/UL (ref 0–0.7)
EOSINOPHIL NFR BLD AUTO: 2 %
ERYTHROCYTE [DISTWIDTH] IN BLOOD BY AUTOMATED COUNT: 12.8 % (ref 10–15)
FLUAV RNA SPEC QL NAA+PROBE: NEGATIVE
FLUBV RNA RESP QL NAA+PROBE: NEGATIVE
GLUCOSE SERPL-MCNC: 130 MG/DL (ref 70–99)
HCO3 BLDV-SCNC: 29 MMOL/L (ref 21–28)
HCT VFR BLD AUTO: 45.5 % (ref 35–47)
HGB BLD-MCNC: 15.2 G/DL (ref 11.7–15.7)
HOLD SPECIMEN: NORMAL
HOLD SPECIMEN: NORMAL
IMM GRANULOCYTES # BLD: 0 10E3/UL
IMM GRANULOCYTES NFR BLD: 0 %
LACTATE SERPL-SCNC: 1.2 MMOL/L (ref 0.7–2)
LYMPHOCYTES # BLD AUTO: 1.5 10E3/UL (ref 0.8–5.3)
LYMPHOCYTES NFR BLD AUTO: 26 %
MCH RBC QN AUTO: 31 PG (ref 26.5–33)
MCHC RBC AUTO-ENTMCNC: 33.4 G/DL (ref 31.5–36.5)
MCV RBC AUTO: 93 FL (ref 78–100)
MONOCYTES # BLD AUTO: 0.6 10E3/UL (ref 0–1.3)
MONOCYTES NFR BLD AUTO: 11 %
NEUTROPHILS # BLD AUTO: 3.7 10E3/UL (ref 1.6–8.3)
NEUTROPHILS NFR BLD AUTO: 60 %
NRBC # BLD AUTO: 0 10E3/UL
NRBC BLD AUTO-RTO: 0 /100
O2/TOTAL GAS SETTING VFR VENT: 0 %
OXYHGB MFR BLDV: 41 % (ref 70–75)
PCO2 BLDV: 43 MM HG (ref 40–50)
PH BLDV: 7.44 [PH] (ref 7.32–7.43)
PLATELET # BLD AUTO: 267 10E3/UL (ref 150–450)
PO2 BLDV: 24 MM HG (ref 25–47)
POTASSIUM SERPL-SCNC: 3.3 MMOL/L (ref 3.4–5.3)
PROT SERPL-MCNC: 7.3 G/DL (ref 6.4–8.3)
RBC # BLD AUTO: 4.91 10E6/UL (ref 3.8–5.2)
RSV RNA SPEC NAA+PROBE: NEGATIVE
SARS-COV-2 RNA RESP QL NAA+PROBE: POSITIVE
SODIUM SERPL-SCNC: 135 MMOL/L (ref 136–145)
WBC # BLD AUTO: 6 10E3/UL (ref 4–11)

## 2023-09-18 PROCEDURE — 80053 COMPREHEN METABOLIC PANEL: CPT | Performed by: EMERGENCY MEDICINE

## 2023-09-18 PROCEDURE — 82805 BLOOD GASES W/O2 SATURATION: CPT | Performed by: EMERGENCY MEDICINE

## 2023-09-18 PROCEDURE — 96374 THER/PROPH/DIAG INJ IV PUSH: CPT | Mod: 59

## 2023-09-18 PROCEDURE — 250N000011 HC RX IP 250 OP 636: Mod: JZ | Performed by: EMERGENCY MEDICINE

## 2023-09-18 PROCEDURE — 71046 X-RAY EXAM CHEST 2 VIEWS: CPT

## 2023-09-18 PROCEDURE — 85025 COMPLETE CBC W/AUTO DIFF WBC: CPT | Performed by: EMERGENCY MEDICINE

## 2023-09-18 PROCEDURE — 258N000003 HC RX IP 258 OP 636: Performed by: EMERGENCY MEDICINE

## 2023-09-18 PROCEDURE — 87637 SARSCOV2&INF A&B&RSV AMP PRB: CPT | Performed by: EMERGENCY MEDICINE

## 2023-09-18 PROCEDURE — 36415 COLL VENOUS BLD VENIPUNCTURE: CPT | Performed by: EMERGENCY MEDICINE

## 2023-09-18 PROCEDURE — 83605 ASSAY OF LACTIC ACID: CPT | Performed by: EMERGENCY MEDICINE

## 2023-09-18 PROCEDURE — 96361 HYDRATE IV INFUSION ADD-ON: CPT

## 2023-09-18 PROCEDURE — 99284 EMERGENCY DEPT VISIT MOD MDM: CPT | Mod: 25

## 2023-09-18 RX ORDER — PREDNISONE 20 MG/1
TABLET ORAL
Qty: 10 TABLET | Refills: 0 | Status: SHIPPED | OUTPATIENT
Start: 2023-09-18 | End: 2023-10-31

## 2023-09-18 RX ORDER — ONDANSETRON 2 MG/ML
4 INJECTION INTRAMUSCULAR; INTRAVENOUS ONCE
Status: COMPLETED | OUTPATIENT
Start: 2023-09-18 | End: 2023-09-18

## 2023-09-18 RX ORDER — ONDANSETRON 4 MG/1
4 TABLET, ORALLY DISINTEGRATING ORAL EVERY 8 HOURS PRN
Qty: 10 TABLET | Refills: 0 | Status: SHIPPED | OUTPATIENT
Start: 2023-09-18 | End: 2023-10-31

## 2023-09-18 RX ADMIN — SODIUM CHLORIDE 1000 ML: 9 INJECTION, SOLUTION INTRAVENOUS at 20:38

## 2023-09-18 RX ADMIN — ONDANSETRON 4 MG: 2 INJECTION INTRAMUSCULAR; INTRAVENOUS at 20:38

## 2023-09-18 ASSESSMENT — ACTIVITIES OF DAILY LIVING (ADL)
ADLS_ACUITY_SCORE: 35
ADLS_ACUITY_SCORE: 35

## 2023-09-19 ENCOUNTER — TELEPHONE (OUTPATIENT)
Dept: OTOLARYNGOLOGY | Facility: CLINIC | Age: 68
End: 2023-09-19
Payer: MEDICARE

## 2023-09-19 NOTE — ED TRIAGE NOTES
Patient arrives with multiple complaints. Complaining of so tired and so weak. Pt started with cold symptoms on Saturday and then started vomiting. Stopped vomiting on Sunday but doesn't feel well today. Complains of a headache and feeling dehydrated.     No meds at home. Able to drink a little bit     Triage Assessment       Row Name 09/18/23 1936       Triage Assessment (Adult)    Airway WDL WDL       Respiratory WDL    Respiratory WDL X  cold like syptoms, cough       Skin Circulation/Temperature WDL    Skin Circulation/Temperature WDL WDL       Cardiac WDL    Cardiac WDL WDL       Peripheral/Neurovascular WDL    Peripheral Neurovascular WDL WDL       Cognitive/Neuro/Behavioral WDL    Cognitive/Neuro/Behavioral WDL X  headache

## 2023-09-19 NOTE — DISCHARGE INSTRUCTIONS
Do not take your statin medication or oxycodone while taking the Paxlovid.  Return to the ER for worsening headaches or new shortness of breath or inability to keep liquids down.  Follow-up with the ENT doctors if you have ongoing trouble hearing out of the left ear.  Use the eardrops to help with the earwax impaction that we saw in the left ear.  This may help with your hearing as well.  Follow-up with your doctor for recheck in the next week or 2.        Discharge Instructions  COVID-19    COVID-19 is the disease caused by a new coronavirus. The virus spreads from person-to-person primarily by droplets when an infected person coughs or sneezes and the droplets are then breathed in by another person.    Symptoms of COVID-19  Many people have no symptoms or mild symptoms.  Symptoms usually appear within a few days, but up to 14-days, after contact with a person with COVID-19.    A mild COVID-19 illness is like a cold and can have fever, cough, sneezing, sore throat, tiredness, headache, and muscle pain.    A moderate COVID-19 illness might include shortness of breath or pneumonia on a chest x-ray.    A severe COVID-19 illness causes significant breathing problems such as low oxygen levels or more serious pneumonia.  Some patients experience loss of taste or smell which is somewhat unique to COVID-19.      Isolation and Quarantine  Testing is recommended for any person with symptoms that could be COVID-19 and often for those exposed to COVID-19. The best way to stop the spread of the virus is to avoid contact with others.    A close contact exposure is being within 6 feet of someone with COVID for 15 minutes.    Isolation refers to sick people staying away from people who are not sick.    A person in quarantine is limiting activity because they were exposed and are waiting to see if they might become sick.    If you test positive for COVID and have no symptoms, you should stay home (isolation) for 5 full days after  the day of the test. You should then wear a mask when around others for another 5 days.    If you test positive for COVID and have mild symptoms, you should stay home (isolation) for at least 5 days after your symptoms began. You can return to normal activities at that time, wearing a mask when around others, for another 5 days as long as your symptoms are improving/resolving and you have been without a fever for 24 hours (without using fever-reducing medicine).    If you test positive for COVID and have more than mild symptoms, you should stay home (isolation) for at least 10 days after your symptoms began. You can return to normal activities at that time as long as your symptoms are improving and you have been without a fever for 24 hours (without using fever-reducing medicine).  For example, if you have a fever and cough for 6 days, you need to stay home 4 more days with no fever for a total of 10 days. Or, if you have a fever and cough for 10 days, you need to stay home one more day with no fever for a total of 11 days.    If you were exposed to COVID and are not vaccinated (or it has been more than six months from your Pfizer or Moderna vaccine or two months from J&J vaccine), you should stay home (quarantine) for 5 days and then wear a mask around others for 5 additional days. A COVID test at day 5 is recommended.    If you were exposed to COVID and are vaccinated (had a booster, had two shots of Pfizer or Moderna vaccine in the last five months, or had J&J vaccine within two months), you do not need to quarantine but should wear a mask around others for 10 days and get a COVID test on day 5.    If you have symptoms but a negative test, you should stay at home until you have mild/improving symptoms and are without fever for 24 hours, using the same judgment you would for when it is safe to return to work/school from strep throat, influenza, or the common cold. If you worsen, you should consider being  re-evaluated.    If you are being tested for COVID because of symptoms and your test is pending, you should stay home until you know your test result.  More details on isolation and quarantine can be found on this website from the CDC:  https://www.cdc.gov/coronavirus/2019-ncov/your-health/quarantine-isolation.html    If I have COVID, how should I protect myself and others?    Do not go to work or school. Have a friend or relative do your shopping. Do not use public transportation (bus, train) or ridesharing (Lyft, Uber).    Separate yourself from other people in your home. As much as possible, you should stay in one room and away from other people in your home. Also, use a separate bathroom, if possible. Avoid handling pets or other animals while sick.     Wear a facemask if you need to be around other people and cover your mouth and nose with a tissue when you cough or sneeze.     Avoid sharing personal household items. You should not share dishes, drinking glasses, forks/knives/spoons, towels, or bedding with other people in your home. After using these items, they should be washed with soap and water. Clean parts of your home that are touched often (doorknobs, faucets, countertops, etc.) daily.     Wash your hands often with soap and water for at least 20 seconds or use an alcohol-based hand  containing at least 60% alcohol.     Avoid touching your face.    Treat your symptoms. You can take Acetaminophen (Tylenol) to treat body aches and fever as needed for comfort. Ibuprofen (Advil or Motrin) can be used as well if you still have symptoms after taking Tylenol. Drink fluids. Rest.    Watch for worsening symptoms such as shortness of breath/difficulty breathing or very severe weakness.    Employers/workplaces are being asked by the Centers for Disease Control (CDC) to not request notes/documentation for you to return to work or prove that you were ill. You may choose to show your employer this paperwork.  Also, repeat testing should not be required to return to work.    Exercise/Sports in rare cases, COVID could affect your heart in a way that makes exercise or participation in sports dangerous.  If you have a mild COVID illness (fever, cough, sore throat, and similar symptoms but no difficulty breathing or abnormalities of the lung): After your COVID symptoms have resolved, wait 14-days before returning to activity.  If you have more than a mild illness (meaning that you have problems with your breathing or lungs) or if you participate in competitive or strenuous activity or have a history of heart disease: Please see your primary doctor/provider prior to return to activity/competition.    COVID treatments such as antiviral and antibody medications are available. They are recommended for those patients who have a risk for developing more severe COVID illness. Importantly, the treatments must be started early in the illness (within 5-7 days, depending on which treatment). These treatments may have been considered today during your visit. If you have other questions, contact your primary doctor/clinic.     You can learn more about COVID treatments from the [facility]:  [email or web address]/coronavirus/meds.html    Return to the Emergency Department if:    If you are developing worsening breathing, shortness of breath, or feel worse you should seek medical attention.  If you are uncertain, contact your health care provider/clinic. If you need emergency medical attention, call 911 and tell them you have been ill.    Discharge Instructions  Headache    You were seen today for a headache. Headaches may be caused by many different things such as muscle tension, sinus inflammation, anxiety and stress, having too little sleep, too much alcohol, some medical conditions or injury. You may have a migraine, which is caused by changes in the blood vessels in your head.  At this time your  provider does not find that your headache is a sign of anything dangerous or life-threatening.  However, sometimes the signs of serious illness do not show up right away.      Generally, every Emergency Department visit should have a follow-up clinic visit with either a primary or a specialty clinic/provider. Please follow-up as instructed by your emergency provider today.    Return to the Emergency Department if:  You get a new fever of 100.4 F or higher.  Your headache gets much worse.  You get a stiff neck with your headache.  You get a new headache that is significantly different or worse than headaches you have had before.  You are vomiting (throwing up) and cannot keep food or water down.  You have blurry or double vision or other problems with your eyes.  You have a new weakness on one side of your body.  You have difficulty with balance which is new.  You or your family thinks you are confused.  You have a seizure.    What can I do to help myself?  Pain medications - You may take a pain medication such as Tylenol  (acetaminophen), Advil , Motrin  (ibuprofen) or Aleve  (naproxen).  Take a pain reliever as soon as you notice symptoms.  Starting medications as soon as you start to have symptoms may lessen the amount of pain you have.  Relaxing in a quiet, dark room may help.  Get enough sleep and eat meals regularly.  You may need to watch for certain foods or other things which may trigger your headaches.  Keeping a journal of your headaches and possible triggers may help you and your primary provider to identify things which you should avoid which may be causing your headaches.  If you were given a prescription for medicine here today, be sure to read all of the information (including the package insert) that comes with your prescription.  This will include important information about the medicine, its side effects, and any warnings that you need to know about.  The pharmacist who fills the prescription can  provide more information and answer questions you may have about the medicine.  If you have questions or concerns that the pharmacist cannot address, please call or return to the Emergency Department.   Remember that you can always come back to the Emergency Department if you are not able to see your regular provider in the amount of time listed above, if you get any new symptoms, or if there is anything that worries you.

## 2023-09-19 NOTE — ED PROVIDER NOTES
"PIT/Triage Evaluation    Patient presented with generalized weakness.  Patient reports 2 days prior to arrival she developed with nasal congestion and URI symptoms.  She had subjective fevers and chills.  This was followed by intractable nausea and vomiting.  There is no associated hematemesis, diarrhea, dysuria or urinary frequency.  She denies chest pain or shortness of breath.  There are no sick contacts.  The weakness has progressively worsened and is generalized.  She reports the gradual onset of headache as well.    Exam is notable for:    Patient Vitals for the past 24 hrs:   BP Temp Pulse Resp SpO2 Height Weight   09/18/23 1937 (!) 137/101 99.7  F (37.6  C) 106 20 94 % 1.727 m (5' 8\") 104.3 kg (230 lb)       Eyes:    Conjunctiva normal  Neck:     Supple, no meningismus.     CV:     Regular rate and rhythm.      No murmurs, rubs or gallops.       No unilateral leg swelling.    PULM:    Clear to auscultation bilateral.       No respiratory distress.      Good air exchange.     No rales or wheezing.     No stridor.  MSK:     No gross deformity to all four extremities.   LYMPH:   No cervical lymphadenopathy.  NEURO:   Alert. Good muscle tone, no atrophy.  Skin:    Warm, dry and intact.    Psych:    Mood is good and affect is appropriate.      Appropriate interventions for symptom management were initiated if applicable.  Appropriate diagnostic tests were initiated if indicated.    Important information for subsequent clinician:    Viral swabs performed and chest x-ray ordered to evaluate for pneumonia. Basic labs and UA recommended. Fluids and antiemetics also ordered.    I briefly evaluated the patient and developed an initial plan of care. I discussed this plan and explained that this brief interaction does not constitute a full evaluation. Patient/family understands that they should wait to be fully evaluated and discuss any test results with another clinician prior to leaving the hospital.       Fransisco, " Luca ELAM MD  09/18/23 2040

## 2023-09-19 NOTE — TELEPHONE ENCOUNTER
This encounter is being sent to inform the clinic that this patient has a referral from Aries Gaffney MD for the diagnoses of Hearing loss of left ear, unspecified hearing loss type [H91.92]; Impacted cerumen of left ear [H61.22] and has requested that this patient be seen within 3-5 days and/or with any provider.  Based on the availability of our provider(s), we are unable to accommodate this request.    Were all sites offered this patient?  Yes    Does scheduling algorithm request to schedule next available?  Patient has been scheduled for the first available opening with Dr Celaya on 4/17.  We have informed the patient that the clinic will review their referral and reach out if a sooner appointment is medically necessary.     George Scott

## 2023-09-19 NOTE — ED PROVIDER NOTES
"  History     Chief Complaint:  Flu Symptoms       The history is provided by the patient.      Rich Terry is a 68 year old female who presents with flu symptoms. She started having onset of generalized weakness 2 days ago. She also complains of a gradual onset headache (not the worst headache of life), left ear hearing loss, nausea, and vomiting. She felt better with fluids given in the ED. Denies chest pain, shortness of breath, abdominal pain, vision issues, one-sided numbness or weakness.       Independent Historian:   None - Patient Only        Medications:      Crestor  Senna    Past Medical History:    Osteoarthritis  Phlebitis and thrombophlebitis of superficial vessels of lower extremities   Varicose veins of lower extremities with ulcer  Vitamin D deficiency  DDD, lumbar  Obesity  Actinic keratosis  Adjustment disorder    Past Surgical History:    Right knee arthroplasty  Exchange poly component arthroplasty knee, left  Saphenous vein radiofrequency ablation  Tibial osteotomy  Planter fascia surgery       Physical Exam   Patient Vitals for the past 24 hrs:   BP Temp Pulse Resp SpO2 Height Weight   09/18/23 1937 (!) 137/101 99.7  F (37.6  C) 106 20 94 % 1.727 m (5' 8\") 104.3 kg (230 lb)        Physical Exam  VS: Reviewed per above  HENT: Mucous membranes moist. L otic canal cerumen impaction.  EYES: sclera anicteric  CV: Rate as noted,  regular rhythm.   RESP: Effort normal. Breath sounds are normal bilaterally.  GI: no tenderness/rebound/guarding, not distended.  NEURO: Alert, moving all extremities  MSK: No deformity of the extremities  SKIN: Warm and dry    Emergency Department Course   Imaging:  Chest XR,  PA & LAT   Final Result   IMPRESSION:       Heart size is normal. Lungs are clear bilaterally. Mediastinum and visualized bony structures are unremarkable.         Report per radiology    Laboratory:  Labs Ordered and Resulted from Time of ED Arrival to Time of ED Departure   INFLUENZA " A/B, RSV, & SARS-COV2 PCR - Abnormal       Result Value    Influenza A PCR Negative      Influenza B PCR Negative      RSV PCR Negative      SARS CoV2 PCR Positive (*)    COMPREHENSIVE METABOLIC PANEL - Abnormal    Sodium 135 (*)     Potassium 3.3 (*)     Chloride 97 (*)     Carbon Dioxide (CO2) 26      Anion Gap 12      Urea Nitrogen 8.6      Creatinine 0.96 (*)     Calcium 9.5      Glucose 130 (*)     Alkaline Phosphatase 97      AST 37      ALT 21      Protein Total 7.3      Albumin 4.0      Bilirubin Total 0.2      GFR Estimate 64     BLOOD GAS VENOUS WITH OXYHEMOGLOBIN - Abnormal    pH Venous 7.44 (*)     pCO2 Venous 43      pO2 Venous 24 (*)     Bicarbonate Venous 29 (*)     FIO2 0      Oxyhemoglobin Venous 41 (*)     Base Excess/Deficit 3.8 (*)    LACTIC ACID WHOLE BLOOD - Normal    Lactic Acid 1.2     CBC WITH PLATELETS AND DIFFERENTIAL    WBC Count 6.0      RBC Count 4.91      Hemoglobin 15.2      Hematocrit 45.5      MCV 93      MCH 31.0      MCHC 33.4      RDW 12.8      Platelet Count 267      % Neutrophils 60      % Lymphocytes 26      % Monocytes 11      % Eosinophils 2      % Basophils 1      % Immature Granulocytes 0      NRBCs per 100 WBC 0      Absolute Neutrophils 3.7      Absolute Lymphocytes 1.5      Absolute Monocytes 0.6      Absolute Eosinophils 0.1      Absolute Basophils 0.0      Absolute Immature Granulocytes 0.0      Absolute NRBCs 0.0     ROUTINE UA WITH MICROSCOPIC REFLEX TO CULTURE       Emergency Department Course & Assessments:    Interventions:  Medications   sodium chloride 0.9% BOLUS 1,000 mL (0 mLs Intravenous Stopped 9/18/23 2257)   ondansetron (ZOFRAN) injection 4 mg (4 mg Intravenous $Given 9/18/23 2038)        Assessments:  2157 I examined the patient and obtained history as noted above.  2248 I rechecked and updated the patient.      Consultations/Discussion of Management or Tests:  None    Social Determinants of Health affecting care:   None    Disposition:  The patient was  discharged to home.     Impression & Plan    Medical Decision Making:  Patient presents to the ER with viral syndrome like symptoms.  Vital signs reassuring.  COVID test did return positive, which likely explains her symptoms.  She reports associated headache but not worst headache of life and no focal neurodeficits on history or exam aside from some diminished hearing of the left ear.  Offered neuroimaging but patient declined.  She does have cerumen impaction in left ear but our ED technicians were unable to successfully extract the cerumen with irrigation.  Plan for Debrox drops and follow-up with ENT.  A course of prednisone will be initiated in the event of occult sensorineural hearing loss contributing to her symptoms.  After IV fluids and antiemetics, patient was feeling better and tolerating p.o.  Abdominal exam is benign.  She feels comfortable with discharge.  No respiratory distress or hypoxemia.  Chest x-ray is clear.  Other labs are largely reassuring.  Patient was interested in Paxlovid.  She tells me that she only takes aspirin and a statin medication.  She understands to stop the statin medication while on the Paxlovid.  Return precautions discussed prior to discharge.      Diagnosis:    ICD-10-CM    1. COVID-19  U07.1       2. Nonintractable headache, unspecified chronicity pattern, unspecified headache type  R51.9       3. Hearing loss of left ear, unspecified hearing loss type  H91.92 Adult ENT  Referral      4. Impacted cerumen of left ear  H61.22 Adult ENT  Referral           Discharge Medications:  New Prescriptions    CARBAMIDE PEROXIDE (DEBROX) 6.5 % OTIC SOLUTION    Place 5 drops Into the left ear 2 times daily for 5 days    NIRMATRELVIR AND RITONAVIR (PAXLOVID) 300 MG/100 MG THERAPY PACK    Take 3 tablets by mouth 2 times daily for 5 days    ONDANSETRON (ZOFRAN ODT) 4 MG ODT TAB    Take 1 tablet (4 mg) by mouth every 8 hours as needed for nausea or vomiting    PREDNISONE  (DELTASONE) 20 MG TABLET    Take two tablets (= 40mg) each day for 5 (five) days          Scribe Disclosure:  I, Dave Violet, am serving as a scribe at 9:59 PM on 9/18/2023 to document services personally performed by Aries Gaffney MD based on my observations and the provider's statements to me.     9/18/2023   Aries Gaffney MD Lindenbaum, Elan, MD  09/19/23 0147

## 2023-09-20 NOTE — TELEPHONE ENCOUNTER
Attempted to call pt and left a VM to call back.  Referral sent for hearing loss of left ear.        [facility]      Megan Hansen RN  [facility]  ENT  [address]  [place] [address]  Office:[phone]  Employed by [facility]

## 2023-09-21 NOTE — TELEPHONE ENCOUNTER
Spoke with Rich about her ear symptoms.  Hearing in left ear is slowly coming back.   has COVID.  Hearing loss can on suddenly.  Pt wants to wait until her COVID symptoms are over and  feels better to call and schedule an appointment with ENT.  Pt has clinic number and will call when she is ready.    [facility]      Megan Hansen RN  [facility]  ENT  [address]  [place] [address]  Office:[phone]  Employed by [facility]

## 2023-10-31 ENCOUNTER — NURSE TRIAGE (OUTPATIENT)
Dept: NURSING | Facility: CLINIC | Age: 68
End: 2023-10-31
Payer: MEDICARE

## 2023-10-31 ENCOUNTER — HOSPITAL ENCOUNTER (INPATIENT)
Facility: CLINIC | Age: 68
LOS: 2 days | Discharge: HOME | DRG: 378 | End: 2023-11-02
Attending: EMERGENCY MEDICINE | Admitting: STUDENT IN AN ORGANIZED HEALTH CARE EDUCATION/TRAINING PROGRAM
Payer: MEDICARE

## 2023-10-31 DIAGNOSIS — K92.2 UPPER GI BLEED: ICD-10-CM

## 2023-10-31 DIAGNOSIS — D62 ANEMIA DUE TO BLOOD LOSS, ACUTE: ICD-10-CM

## 2023-10-31 DIAGNOSIS — K25.4 GASTROINTESTINAL HEMORRHAGE ASSOCIATED WITH GASTRIC ULCER: Primary | ICD-10-CM

## 2023-10-31 LAB
ABO/RH(D): NORMAL
ALBUMIN SERPL BCG-MCNC: 3.5 G/DL (ref 3.5–5.2)
ALP SERPL-CCNC: 74 U/L (ref 35–104)
ALT SERPL W P-5'-P-CCNC: 9 U/L (ref 0–50)
ANION GAP SERPL CALCULATED.3IONS-SCNC: 7 MMOL/L (ref 7–15)
ANTIBODY SCREEN: NEGATIVE
AST SERPL W P-5'-P-CCNC: 22 U/L (ref 0–45)
BASOPHILS # BLD AUTO: 0.1 10E3/UL (ref 0–0.2)
BASOPHILS NFR BLD AUTO: 1 %
BILIRUB SERPL-MCNC: 0.2 MG/DL
BUN SERPL-MCNC: 12.4 MG/DL (ref 8–23)
CALCIUM SERPL-MCNC: 9.1 MG/DL (ref 8.8–10.2)
CHLORIDE SERPL-SCNC: 104 MMOL/L (ref 98–107)
CREAT SERPL-MCNC: 0.71 MG/DL (ref 0.51–0.95)
DEPRECATED HCO3 PLAS-SCNC: 23 MMOL/L (ref 22–29)
EGFRCR SERPLBLD CKD-EPI 2021: >90 ML/MIN/1.73M2
EOSINOPHIL # BLD AUTO: 0.2 10E3/UL (ref 0–0.7)
EOSINOPHIL NFR BLD AUTO: 2 %
ERYTHROCYTE [DISTWIDTH] IN BLOOD BY AUTOMATED COUNT: 14.1 % (ref 10–15)
GLUCOSE SERPL-MCNC: 113 MG/DL (ref 70–99)
HCT VFR BLD AUTO: 23.4 % (ref 35–47)
HGB BLD-MCNC: 7.5 G/DL (ref 11.7–15.7)
HOLD SPECIMEN: NORMAL
HOLD SPECIMEN: NORMAL
IMM GRANULOCYTES # BLD: 0 10E3/UL
IMM GRANULOCYTES NFR BLD: 0 %
LIPASE SERPL-CCNC: 33 U/L (ref 13–60)
LYMPHOCYTES # BLD AUTO: 3 10E3/UL (ref 0.8–5.3)
LYMPHOCYTES NFR BLD AUTO: 31 %
MCH RBC QN AUTO: 30.7 PG (ref 26.5–33)
MCHC RBC AUTO-ENTMCNC: 32.1 G/DL (ref 31.5–36.5)
MCV RBC AUTO: 96 FL (ref 78–100)
MONOCYTES # BLD AUTO: 0.5 10E3/UL (ref 0–1.3)
MONOCYTES NFR BLD AUTO: 5 %
NEUTROPHILS # BLD AUTO: 5.9 10E3/UL (ref 1.6–8.3)
NEUTROPHILS NFR BLD AUTO: 61 %
NRBC # BLD AUTO: 0 10E3/UL
NRBC BLD AUTO-RTO: 0 /100
PLATELET # BLD AUTO: 316 10E3/UL (ref 150–450)
POTASSIUM SERPL-SCNC: 3.6 MMOL/L (ref 3.4–5.3)
PROT SERPL-MCNC: 5.9 G/DL (ref 6.4–8.3)
RBC # BLD AUTO: 2.44 10E6/UL (ref 3.8–5.2)
SODIUM SERPL-SCNC: 134 MMOL/L (ref 135–145)
SPECIMEN EXPIRATION DATE: NORMAL
WBC # BLD AUTO: 9.6 10E3/UL (ref 4–11)

## 2023-10-31 PROCEDURE — 86901 BLOOD TYPING SEROLOGIC RH(D): CPT | Performed by: EMERGENCY MEDICINE

## 2023-10-31 PROCEDURE — 85025 COMPLETE CBC W/AUTO DIFF WBC: CPT | Performed by: EMERGENCY MEDICINE

## 2023-10-31 PROCEDURE — 83690 ASSAY OF LIPASE: CPT | Performed by: EMERGENCY MEDICINE

## 2023-10-31 PROCEDURE — 250N000011 HC RX IP 250 OP 636: Mod: JZ | Performed by: EMERGENCY MEDICINE

## 2023-10-31 PROCEDURE — 99223 1ST HOSP IP/OBS HIGH 75: CPT | Mod: AI | Performed by: STUDENT IN AN ORGANIZED HEALTH CARE EDUCATION/TRAINING PROGRAM

## 2023-10-31 PROCEDURE — 86850 RBC ANTIBODY SCREEN: CPT | Performed by: EMERGENCY MEDICINE

## 2023-10-31 PROCEDURE — C9113 INJ PANTOPRAZOLE SODIUM, VIA: HCPCS | Mod: JZ | Performed by: EMERGENCY MEDICINE

## 2023-10-31 PROCEDURE — 120N000004 HC R&B MS OVERFLOW

## 2023-10-31 PROCEDURE — 93005 ELECTROCARDIOGRAM TRACING: CPT

## 2023-10-31 PROCEDURE — 258N000003 HC RX IP 258 OP 636: Performed by: STUDENT IN AN ORGANIZED HEALTH CARE EDUCATION/TRAINING PROGRAM

## 2023-10-31 PROCEDURE — 99285 EMERGENCY DEPT VISIT HI MDM: CPT | Mod: 25

## 2023-10-31 PROCEDURE — 96374 THER/PROPH/DIAG INJ IV PUSH: CPT | Mod: 59

## 2023-10-31 PROCEDURE — 80053 COMPREHEN METABOLIC PANEL: CPT | Performed by: EMERGENCY MEDICINE

## 2023-10-31 PROCEDURE — 36415 COLL VENOUS BLD VENIPUNCTURE: CPT | Performed by: EMERGENCY MEDICINE

## 2023-10-31 PROCEDURE — 86923 COMPATIBILITY TEST ELECTRIC: CPT | Performed by: INTERNAL MEDICINE

## 2023-10-31 RX ORDER — NALOXONE HYDROCHLORIDE 0.4 MG/ML
0.4 INJECTION, SOLUTION INTRAMUSCULAR; INTRAVENOUS; SUBCUTANEOUS
Status: DISCONTINUED | OUTPATIENT
Start: 2023-10-31 | End: 2023-11-02 | Stop reason: HOSPADM

## 2023-10-31 RX ORDER — SODIUM CHLORIDE, SODIUM LACTATE, POTASSIUM CHLORIDE, CALCIUM CHLORIDE 600; 310; 30; 20 MG/100ML; MG/100ML; MG/100ML; MG/100ML
INJECTION, SOLUTION INTRAVENOUS CONTINUOUS
Status: DISCONTINUED | OUTPATIENT
Start: 2023-10-31 | End: 2023-11-01

## 2023-10-31 RX ORDER — ONDANSETRON 4 MG/1
4 TABLET, ORALLY DISINTEGRATING ORAL EVERY 8 HOURS PRN
Status: DISCONTINUED | OUTPATIENT
Start: 2023-10-31 | End: 2023-10-31

## 2023-10-31 RX ORDER — PROCHLORPERAZINE MALEATE 5 MG
5 TABLET ORAL EVERY 6 HOURS PRN
Status: DISCONTINUED | OUTPATIENT
Start: 2023-10-31 | End: 2023-11-02 | Stop reason: HOSPADM

## 2023-10-31 RX ORDER — OXYCODONE HYDROCHLORIDE 5 MG/1
5-10 TABLET ORAL EVERY 4 HOURS PRN
Status: DISCONTINUED | OUTPATIENT
Start: 2023-10-31 | End: 2023-11-02 | Stop reason: HOSPADM

## 2023-10-31 RX ORDER — NALOXONE HYDROCHLORIDE 0.4 MG/ML
0.2 INJECTION, SOLUTION INTRAMUSCULAR; INTRAVENOUS; SUBCUTANEOUS
Status: DISCONTINUED | OUTPATIENT
Start: 2023-10-31 | End: 2023-11-02 | Stop reason: HOSPADM

## 2023-10-31 RX ORDER — ONDANSETRON 2 MG/ML
4 INJECTION INTRAMUSCULAR; INTRAVENOUS EVERY 6 HOURS PRN
Status: DISCONTINUED | OUTPATIENT
Start: 2023-10-31 | End: 2023-11-02 | Stop reason: HOSPADM

## 2023-10-31 RX ORDER — PROCHLORPERAZINE 25 MG
12.5 SUPPOSITORY, RECTAL RECTAL EVERY 12 HOURS PRN
Status: DISCONTINUED | OUTPATIENT
Start: 2023-10-31 | End: 2023-11-02 | Stop reason: HOSPADM

## 2023-10-31 RX ORDER — ONDANSETRON 4 MG/1
4 TABLET, ORALLY DISINTEGRATING ORAL EVERY 6 HOURS PRN
Status: DISCONTINUED | OUTPATIENT
Start: 2023-10-31 | End: 2023-11-02 | Stop reason: HOSPADM

## 2023-10-31 RX ORDER — ASPIRIN 81 MG/1
81 TABLET ORAL DAILY
Status: ON HOLD | COMMUNITY
End: 2023-11-02

## 2023-10-31 RX ORDER — ACETAMINOPHEN 325 MG/1
650 TABLET ORAL EVERY 4 HOURS PRN
Status: DISCONTINUED | OUTPATIENT
Start: 2023-10-31 | End: 2023-11-02 | Stop reason: HOSPADM

## 2023-10-31 RX ADMIN — SODIUM CHLORIDE, POTASSIUM CHLORIDE, SODIUM LACTATE AND CALCIUM CHLORIDE: 600; 310; 30; 20 INJECTION, SOLUTION INTRAVENOUS at 23:00

## 2023-10-31 RX ADMIN — PANTOPRAZOLE SODIUM 40 MG: 40 INJECTION, POWDER, FOR SOLUTION INTRAVENOUS at 16:53

## 2023-10-31 ASSESSMENT — ACTIVITIES OF DAILY LIVING (ADL)
ADLS_ACUITY_SCORE: 35
ADLS_ACUITY_SCORE: 20

## 2023-10-31 NOTE — ED NOTES
"[facility]  ED Nurse Handoff Report    ED Chief complaint: Rectal Bleeding and Generalized Weakness  . ED Diagnosis:   Final diagnoses:   Upper GI bleed   Anemia due to blood loss, acute       Allergies:   Allergies   Allergen Reactions    Levaquin Muscle Pain (Myalgia)    Shrimp      throat swells    Sulfa Antibiotics Rash     rash       Code Status: Full Code    Activity level - Baseline/Home:  independent.  Activity Level - Current:   standby.   Lift room needed: No.   Bariatric: No   Needed: No   Isolation: No.   Infection: Not Applicable.     Respiratory status: Room air    Vital Signs (within 30 minutes):   Vitals:    10/31/23 1416 10/31/23 1650   BP: 127/84 133/77   Pulse: 106    Resp: 18    Temp: 98.9  F (37.2  C)    TempSrc: Temporal    SpO2: 100% 100%   Weight: 100.6 kg (221 lb 12.5 oz)    Height: 1.727 m (5' 8\")        Cardiac Rhythm:  ,      Pain level:    Patient confused: No.   Patient Falls Risk: patient and family education.   Elimination Status: Has voided     Patient Report - Initial Complaint: Rectal bleeding, generalized weakness.   Focused Assessment: Pt reports loose stools, none seen in the ED. Reports generalized weakness, ambulatory with standby     Abnormal Results:   Labs Ordered and Resulted from Time of ED Arrival to Time of ED Departure   COMPREHENSIVE METABOLIC PANEL - Abnormal       Result Value    Sodium 134 (*)     Potassium 3.6      Carbon Dioxide (CO2) 23      Anion Gap 7      Urea Nitrogen 12.4      Creatinine 0.71      GFR Estimate >90      Calcium 9.1      Chloride 104      Glucose 113 (*)     Alkaline Phosphatase 74      AST 22      ALT 9      Protein Total 5.9 (*)     Albumin 3.5      Bilirubin Total 0.2     CBC WITH PLATELETS AND DIFFERENTIAL - Abnormal    WBC Count 9.6      RBC Count 2.44 (*)     Hemoglobin 7.5 (*)     Hematocrit 23.4 (*)     MCV 96      MCH 30.7      MCHC 32.1      RDW 14.1      Platelet Count 316      % Neutrophils 61   "    % Lymphocytes 31      % Monocytes 5      % Eosinophils 2      % Basophils 1      % Immature Granulocytes 0      NRBCs per 100 WBC 0      Absolute Neutrophils 5.9      Absolute Lymphocytes 3.0      Absolute Monocytes 0.5      Absolute Eosinophils 0.2      Absolute Basophils 0.1      Absolute Immature Granulocytes 0.0      Absolute NRBCs 0.0     LIPASE - Normal    Lipase 33     TYPE AND SCREEN, ADULT    ABO/RH(D) O POS      Antibody Screen Negative      SPECIMEN EXPIRATION DATE [id number]     ABO/RH TYPE AND SCREEN        No orders to display       Treatments provided: See MAR  Family Comments: Pt contacting family  OBS brochure/video discussed/provided to patient:  Yes  ED Medications:   Medications   pantoprazole (PROTONIX) IV push injection 40 mg (40 mg Intravenous $Given 10/31/23 4924)       Drips infusing:  No  For the majority of the shift this patient was Green.   Interventions performed were na.    Sepsis treatment initiated: No    Cares/treatment/interventions/medications to be completed following ED care: See MAR    ED Nurse Name: Amrit Angelo RN  6:44 PM    RECEIVING UNIT ED HANDOFF REVIEW    Above ED Nurse Handoff Report was reviewed: Yes  Reviewed by: Vanessa Munoz RN on October 31, 2023 at 9:43 PM

## 2023-10-31 NOTE — ED PROVIDER NOTES
History     Chief Complaint:  Rectal Bleeding and Generalized Weakness       The history is provided by the patient.      Rich Terry is a 68 year old female with history of gastroesophageal reflux disease who presents to the ED with hematemesis, bloody stool and generalized weakness. Patient was in [place] on Friday when she suddenly began to feel sick while at some races. She had multiple episodes of vomit that was black and tarry. Later on, patient had some potatoes and chicken before having more vomiting episodes that were bright red. She additionally has had multiple episodes of diarrhea with bloody stool. Patient the next day went to the [facility] in [place], but due to travel plans, denied admission. Patient additionally endorses some shortness of breath and palpitations with activity.  Since Saturday, her stools have been dark black in color. After coming back to [place], patient was recommended to come to ED. Patient notes generalized weakness as well during this time. Patient also states that she has been taking a lot of Ibuprofen for pain during this time to keep up with family. Patient reports her hemoglobin is usually 12-13 and was 7.5 in the ED. Denies liver disease, stomach ulcers, and alcohol use.     Independent Historian:   None - Patient Only    Review of External Notes:   None       Medications:    Tylenol  Aspirin 325 mg  Zofran  Roxicodone  Deltasone  Crestor    Past Medical History:    Generalized osteoarthrosis  Phlebitis and thrombophlebitis  of superficial vessel of lower extremity   Varicose veins of lower extremities with ulcer    Past Surgical History:    Plantar fascia surgery  Left knee surgery x4  Tibial osteotomy  Varicose vein procedure  Arthoplasty knee, left and right    Physical Exam   Patient Vitals for the past 24 hrs:   BP Temp Temp src Pulse Resp SpO2 Height Weight   10/31/23 1650 133/77 -- -- -- -- 100 % -- --   10/31/23 1416 127/84 98.9  F (37.2  C)  "Temporal 106 18 100 % 1.727 m (5' 8\") 100.6 kg (221 lb 12.5 oz)        Physical Exam  General:              Well-nourished              Speaking in full sentences  Eyes:              Conjunctiva without injection or scleral icterus  ENT:              Moist mucous membranes              Nares patent              Pinnae normal  Neck:              Full ROM              No stiffness appreciated  Resp:              Lungs CTAB              No crackles, wheezing or audible rubs              Good air movement  CV:                    Tachycardic rate, regular rhythm              S1 and S2 present              No murmur, gallop or rub  GI:              BS present              Abdomen soft without distention              Non-tender to light and deep palpation              No guarding or rebound tenderness  Skin:              Warm, dry, well perfused              No rashes or open wounds on exposed skin  MSK:              Moves all extremities              No focal deformities or swelling  Neuro:              Alert              Answers questions appropriately              Moves all extremities equally              Gait stable  Psych:              Normal affect, normal mood    Emergency Department Course   ECG  ECG results from 10/31/23   EKG 12-lead, tracing only     Value    Systolic Blood Pressure     Diastolic Blood Pressure     Ventricular Rate 87    Atrial Rate 87    AK Interval 134    QRS Duration 134        QTc 507    P Axis 58    R AXIS -23    T Axis 49    Interpretation ECG      Sinus rhythm with Premature atrial complexes  Right bundle branch block  Abnormal ECG  When compared with ECG of 21-JUL-2009 14:19,  Premature ventricular complexes are no longer Present  Premature atrial complexes are now Present  Right bundle branch block is now Present       Laboratory:  Labs Ordered and Resulted from Time of ED Arrival to Time of ED Departure   COMPREHENSIVE METABOLIC PANEL - Abnormal       Result Value    Sodium 134 " (*)     Potassium 3.6      Carbon Dioxide (CO2) 23      Anion Gap 7      Urea Nitrogen 12.4      Creatinine 0.71      GFR Estimate >90      Calcium 9.1      Chloride 104      Glucose 113 (*)     Alkaline Phosphatase 74      AST 22      ALT 9      Protein Total 5.9 (*)     Albumin 3.5      Bilirubin Total 0.2     CBC WITH PLATELETS AND DIFFERENTIAL - Abnormal    WBC Count 9.6      RBC Count 2.44 (*)     Hemoglobin 7.5 (*)     Hematocrit 23.4 (*)     MCV 96      MCH 30.7      MCHC 32.1      RDW 14.1      Platelet Count 316      % Neutrophils 61      % Lymphocytes 31      % Monocytes 5      % Eosinophils 2      % Basophils 1      % Immature Granulocytes 0      NRBCs per 100 WBC 0      Absolute Neutrophils 5.9      Absolute Lymphocytes 3.0      Absolute Monocytes 0.5      Absolute Eosinophils 0.2      Absolute Basophils 0.1      Absolute Immature Granulocytes 0.0      Absolute NRBCs 0.0     LIPASE - Normal    Lipase 33     TYPE AND SCREEN, ADULT    ABO/RH(D) O POS      Antibody Screen Negative      SPECIMEN EXPIRATION DATE [id number]     ABO/RH TYPE AND SCREEN       Emergency Department Course & Assessments:    Interventions:  Medications   pantoprazole (PROTONIX) IV push injection 40 mg (40 mg Intravenous $Given 10/31/23 1653)        Assessments:  1648 I obtained the history and examined the patient as noted above.  1818 I rechecked and updated the patient.    Independent Interpretation (X-rays, CTs, rhythm strip):  None    Consultations/Discussion of Management or Tests:  None        Social Determinants of Health affecting care:   None    Disposition:  The patient was admitted to the hospital under the care of Dr. Rosario.     Impression & Plan    Medical Decision Making:  Rich Terry is a 68 year old year old female who presents to the ER with blood in the stool.  VS on presentation reveal tachycardia.  Possible sources of bleeding include upper GI sources (ulcer, gastritis, AVM, tumor, etc) vs lower GI sources  (tumor, diverticulosis, AVM, meckels diverticulum, etc), as well as colitis, aortoenteric fistula, hemorrhoids, fissures, ischemic colitis, infectious colitis (i.e. bacteral causes such as salmonella, shigella, E. Coli, camylobacter).     Work-up here in the emergency department is consistent with GI bleeding, although the exact source of bleeding remains unclear at this time.  I am most suspicious for upper GI bleed given melanotic stool, increased use of NSAIDs and coffee ground emesis.  Cannot exclude lower GI source at this time given report of [facility], though in the absence of ongoing red stool since 4 days ago, low suspicion for acute lower GI bleed at this time.  Given recent CT scan at OSH, do feel repeat CT can be deferred safely.  Patient is not on blood thinning medications which would complicate their current presentation.   Hemodynamically, the patient has remained in stable condition, and for that reason, patient has not required emergent blood transfusion or emergent consultation by gastroenterology for endoscopy or colonoscopy.     Given the degree of bleeding, and risk factors identified above, the patient will be admitted to the hospital for further treatment and cares.  A type and screen has been obtained.  Currently, the patient is hemodynamically stable.  The case was discussed with the accepting hospital team, who are in agreement with the plan of care and have accepted the patient to their service.  Patient was updated regarding the proposed plan of care and was agreeable.      Diagnosis:    ICD-10-CM    1. Upper GI bleed  K92.2       2. Anemia due to blood loss, acute  D62          Scribe Disclosure:  I, Carlos Arce, am serving as a scribe at 4:50 PM on 10/31/2023 to document services personally performed by Hill Grover MD based on my observations and the provider's statements to me.     10/31/2023    Hill Grover MD Roach, Brian Donald, MD  10/31/23 2308

## 2023-10-31 NOTE — TELEPHONE ENCOUNTER
Patient calling. [place], and was diagnosed with diverticulosis on Saturday.    Today, she's having shortness of breath and with activity . She has to sit after approximately 10 steps or she feels like she'll fall. On Friday, she had large amounts of blood in her stool, which slowly decreased over time, and she had also vomited blood. Yesterday she had black, tarry stools.     Her CBC was abnormal at the ED in [place].    Care advice given for patient to go to the emergency department at [facility]. Patient's  will drive her.     Glenys Ross RN  [facility]  October 31, 2023, 1:01 PM    Reason for Disposition   SEVERE dizziness (e.g., unable to stand, requires support to walk, feels like passing out now)    Additional Information   Negative: Passed out (i.e., fainted, collapsed and was not responding)   Negative: Shock suspected (e.g., cold/pale/clammy skin, too weak to stand, low BP, rapid pulse)   Negative: Vomiting red blood or black (coffee ground) material   Negative: Sounds like a life-threatening emergency to the triager   Negative: Diarrhea is main symptom   Negative: Rectal symptoms   Negative: SEVERE rectal bleeding (large blood clots; constant or on and off bleeding)    Protocols used: Rectal Bleeding-A-OH

## 2023-10-31 NOTE — ED TRIAGE NOTES
Presents to ED with generalized weakness. Recent trip to [place] and seen in ED there. Pt had bloody diarrhea. Pt not on blood thinners. They were going to admit patient in [facility], but told she would just get IV fluids. ABCs intact. A&OX4.      Triage Assessment (Adult)       Row Name 10/31/23 1414          Triage Assessment    Airway WDL WDL        Respiratory WDL    Respiratory WDL WDL        Skin Circulation/Temperature WDL    Skin Circulation/Temperature WDL WDL        Cardiac WDL    Cardiac WDL WDL        Peripheral/Neurovascular WDL    Peripheral Neurovascular WDL WDL        Cognitive/Neuro/Behavioral WDL    Cognitive/Neuro/Behavioral WDL WDL

## 2023-10-31 NOTE — H&P
[facility]  Hospitalist Admission Note  Name: Rich Terry    MRN: [id number]  YOB: 1955    Age: 68 year old  Date of admission: 10/31/2023  Primary care provider: Ragini Stevens    Chief Complaint: GI bleed    Rich Terry is a 68 year old female with history of osteoarthritis and lower extremity thrombophlebitis started having coffee-ground emesis on 10/27 when she was in [facility].  Later in the day she had bright red blood per rectum followed by melena.  No abdominal pain, fever or chills.  She was seen at the hospital in [facility] and her hemoglobin was 9.  Her CT abdomen showed diverticulosis and she was advised to stay in the hospital with plan to do endoscopic procedures on Monday (10/30) but she was returning to [place] that day and decided to come home.  She continued to have melena on Monday and came to ER for evaluation today.  She does not have any ongoing melena or hematemesis at this time.    Patient was using up to 6 pills of ibuprofen of unknown strength per day while at [facility].  She is also on baby aspirin.  No prior history of GI bleed.    Vitals on arrival: Temperature 98.9  F, heart rate 106/min, blood pressure 127/84, respiratory of 18 and oxygen saturation of 100/min.  Hemoglobin was 7.5 with platelet count of 316.  BUN/creatinine of 12.4/0.71.    Assessment and Plan:   Acute blood loss anemia due to GI bleed:   -- Likely upper GI bleed due to NSAID induced gastritis versus gastric/duodenal ulcer.  -- Does not appear to be actively bleeding.  Will allow liquid diet tonight and keep n.p.o. at midnight for possible endoscopy.  -- Start Protonix 40 mg IV twice daily.  -- Monitor hemoglobin every 8 hours, transfuse for less than 7.  -- No more NSAIDs or aspirin    2.   Osteoarthritis.  -- Okay to use acetaminophen as needed.    3.   Recent COVID infection: Patient had COVID about 3 weeks ago and had completely recovered from it.    4.   Hyperlipidemia.  Resume  "statins upon discharge.    5.   History of lower extremity thrombophlebitis and varicose veins.:  Has chronic swelling in lower extremities, left more than right.      DVT Prophylaxis: Pneumatic Compression Devices  Code Status: No CPR but okay to intubate for short duration.  I discussed this with the patient in presence of her .  Discharge Dispo: Home    Clinically Significant Risk Factors Present on Admission                # Drug Induced Platelet Defect: home medication list includes an antiplatelet medication        # Obesity: Estimated body mass index is 33.72 kg/m  as calculated from the following:    Height as of this encounter: 1.727 m (5' 8\").    Weight as of this encounter: 100.6 kg (221 lb 12.5 oz).                    History of Present Illness:  Rich Terry is a 68 year old female with history of osteoarthritis and lower extremity thrombophlebitis started having coffee-ground emesis on 10/27 when she was in [facility].  Later in the day she had bright red blood per rectum followed by melena.  No abdominal pain, fever or chills.  She was seen at the hospital in [facility] and her hemoglobin was 9.  Her CT abdomen showed diverticulosis and she was advised to stay in the hospital with plan to do endoscopic procedures on Monday (10/30) but she was returning to [place] that day and decided to come home.  She continued to have melena on Monday and came to ER for evaluation today.  She does not have any ongoing melena or hematemesis at this time.    Patient was using up to 6 pills of ibuprofen of unknown strength per day while at [facility].  She is also on baby aspirin.  No prior history of GI bleed.    Vitals on arrival: Temperature 98.9  F, heart rate 106/min, blood pressure 127/84, respiratory of 18 and oxygen saturation of 100/min.  Hemoglobin was 7.5 with platelet count of 316.  BUN/creatinine of 12.4/0.71.         Past Medical History:  Past Medical History:   Diagnosis Date    Generalized osteoarthrosis, " unspecified site     Pain in right knee     Phlebitis and thrombophlebitis of superficial vessels of lower extremities     several episodes    Thrombosis of leg     superficial, not deep    Varicose veins of lower extremities with ulcer (H)      Past Surgical History:  Past Surgical History:   Procedure Laterality Date    ARTHROPLASTY KNEE Right 2015    Procedure: ARTHROPLASTY KNEE;  Surgeon: Prasad Valladares MD;  Location: [facility] OR    EXCHANGE POLY COMPONENT ARTHROPLASTY KNEE Left 2023    Procedure: Polyethylene exchange of left total knee arthroplasty using Dayanna NexGen LPS knee;  Surgeon: Prasad Valladares MD;  Location: [facility] OR    saphenous vein radiofrequency ablation[  2012    SURGICAL HISTORY OF -       rt LE varicose vein procedure    SURGICAL HISTORY OF -   05    Tibial osteotomy (lt leg)    varicose vein avulsions/stab phlebectomy[  2012    [facility] NONSPECIFIC PROCEDURE      4 knee surgeries (all left knee)    [facility] NONSPECIFIC PROCEDURE      (L) planter fascia surgery     Social History:  Social History     Tobacco Use    Smoking status: Former     Packs/day: 1.00     Years: 10.00     Additional pack years: 0.00     Total pack years: 10.00     Types: Cigarettes     Start date:      Quit date:      Years since quittin.8    Smokeless tobacco: Never   Substance Use Topics    Alcohol use: Yes     Comment: 1-2 drinks a month     Social History     Social History Narrative    Not on file     Family History:  Family History   Problem Relation Age of Onset    Cancer Mother         cervical ca age 36    Heart Disease Mother         A-FIB    Skin Cancer Mother         on nose    Heart Disease Father          CHF age 51    Diabetes Maternal Grandfather     Cancer Maternal Grandmother         uterine ca dx in her 60's    Diabetes Paternal Grandfather     Hypertension Brother     Cancer Maternal Aunt         ovarian ca in her 40's    Diabetes Paternal Uncle          "multiple Puncles with DM     Allergies:  Allergies   Allergen Reactions    Levaquin Muscle Pain (Myalgia)    Shrimp      throat swells    Sulfa Antibiotics Rash     rash     Medications:  (Not in a hospital admission)    Review of Systems:  A Comprehensive greater than 10 system review of systems was carried out.  Pertinent positives and negatives are noted above.  Otherwise negative for contributory information.     Physical Exam:  Blood pressure 133/77, pulse 106, temperature 98.9  F (37.2  C), temperature source Temporal, resp. rate 18, height 1.727 m (5' 8\"), weight 100.6 kg (221 lb 12.5 oz), last menstrual period 09/11/2005, SpO2 100%, not currently breastfeeding.  Wt Readings from Last 1 Encounters:   10/31/23 100.6 kg (221 lb 12.5 oz)     Exam:  General Appearance: Well-appearing female in no acute distress.  Eyes: No icterus  HEENT: Moist mucosa  Respiratory: Clear to auscultation  Cardiovascular: S1 and S2 is normal  GI: Soft and nontender  Lymph/Hematologic: Not examined  Genitourinary: Not examined  Skin: No rash  Musculoskeletal: Right lower extremity varicose vein.  Chronic left lower extremity with edema  Neurologic: Nonfocal  Psychiatric: Normal mood      I have personally reviewed the following data including lab tests and imaging:  EKG: Sinus rhythm with PACs  Imaging:  No results found for this or any previous visit (from the past 24 hour(s)).    Joshua Rosario MD  Hospitalist  [facility]         "

## 2023-11-01 LAB
ANION GAP SERPL CALCULATED.3IONS-SCNC: 5 MMOL/L (ref 7–15)
ATRIAL RATE - MUSE: 87 BPM
BLD PROD TYP BPU: NORMAL
BLOOD COMPONENT TYPE: NORMAL
BUN SERPL-MCNC: 9.5 MG/DL (ref 8–23)
CALCIUM SERPL-MCNC: 8.7 MG/DL (ref 8.8–10.2)
CHLORIDE SERPL-SCNC: 109 MMOL/L (ref 98–107)
CODING SYSTEM: NORMAL
CREAT SERPL-MCNC: 0.73 MG/DL (ref 0.51–0.95)
CROSSMATCH: NORMAL
DEPRECATED HCO3 PLAS-SCNC: 25 MMOL/L (ref 22–29)
DIASTOLIC BLOOD PRESSURE - MUSE: NORMAL MMHG
EGFRCR SERPLBLD CKD-EPI 2021: 89 ML/MIN/1.73M2
ERYTHROCYTE [DISTWIDTH] IN BLOOD BY AUTOMATED COUNT: 14.3 % (ref 10–15)
GLUCOSE SERPL-MCNC: 102 MG/DL (ref 70–99)
HCT VFR BLD AUTO: 20.3 % (ref 35–47)
HGB BLD-MCNC: 6.5 G/DL (ref 11.7–15.7)
HGB BLD-MCNC: 8.2 G/DL (ref 11.7–15.7)
INTERPRETATION ECG - MUSE: NORMAL
ISSUE DATE AND TIME: NORMAL
MAGNESIUM SERPL-MCNC: 2 MG/DL (ref 1.7–2.3)
MCH RBC QN AUTO: 31.3 PG (ref 26.5–33)
MCHC RBC AUTO-ENTMCNC: 32 G/DL (ref 31.5–36.5)
MCV RBC AUTO: 98 FL (ref 78–100)
P AXIS - MUSE: 58 DEGREES
PLATELET # BLD AUTO: 254 10E3/UL (ref 150–450)
POTASSIUM SERPL-SCNC: 3.4 MMOL/L (ref 3.4–5.3)
POTASSIUM SERPL-SCNC: 3.9 MMOL/L (ref 3.4–5.3)
PR INTERVAL - MUSE: 134 MS
QRS DURATION - MUSE: 134 MS
QT - MUSE: 422 MS
QTC - MUSE: 507 MS
R AXIS - MUSE: -23 DEGREES
RBC # BLD AUTO: 2.08 10E6/UL (ref 3.8–5.2)
SODIUM SERPL-SCNC: 139 MMOL/L (ref 135–145)
SYSTOLIC BLOOD PRESSURE - MUSE: NORMAL MMHG
T AXIS - MUSE: 49 DEGREES
UNIT ABO/RH: NORMAL
UNIT NUMBER: NORMAL
UNIT STATUS: NORMAL
UNIT TYPE ISBT: 5100
UPPER GI ENDOSCOPY: NORMAL
VENTRICULAR RATE- MUSE: 87 BPM
WBC # BLD AUTO: 9.1 10E3/UL (ref 4–11)

## 2023-11-01 PROCEDURE — 85018 HEMOGLOBIN: CPT | Performed by: INTERNAL MEDICINE

## 2023-11-01 PROCEDURE — 250N000011 HC RX IP 250 OP 636: Performed by: INTERNAL MEDICINE

## 2023-11-01 PROCEDURE — 120N000004 HC R&B MS OVERFLOW

## 2023-11-01 PROCEDURE — 84132 ASSAY OF SERUM POTASSIUM: CPT | Performed by: STUDENT IN AN ORGANIZED HEALTH CARE EDUCATION/TRAINING PROGRAM

## 2023-11-01 PROCEDURE — 250N000009 HC RX 250: Performed by: INTERNAL MEDICINE

## 2023-11-01 PROCEDURE — 88305 TISSUE EXAM BY PATHOLOGIST: CPT | Mod: 26 | Performed by: PATHOLOGY

## 2023-11-01 PROCEDURE — C9113 INJ PANTOPRAZOLE SODIUM, VIA: HCPCS | Mod: JZ | Performed by: STUDENT IN AN ORGANIZED HEALTH CARE EDUCATION/TRAINING PROGRAM

## 2023-11-01 PROCEDURE — 250N000013 HC RX MED GY IP 250 OP 250 PS 637: Performed by: STUDENT IN AN ORGANIZED HEALTH CARE EDUCATION/TRAINING PROGRAM

## 2023-11-01 PROCEDURE — 0DB68ZX EXCISION OF STOMACH, VIA NATURAL OR ARTIFICIAL OPENING ENDOSCOPIC, DIAGNOSTIC: ICD-10-PCS | Performed by: INTERNAL MEDICINE

## 2023-11-01 PROCEDURE — P9016 RBC LEUKOCYTES REDUCED: HCPCS | Performed by: INTERNAL MEDICINE

## 2023-11-01 PROCEDURE — 250N000011 HC RX IP 250 OP 636: Mod: JZ | Performed by: STUDENT IN AN ORGANIZED HEALTH CARE EDUCATION/TRAINING PROGRAM

## 2023-11-01 PROCEDURE — G0500 MOD SEDAT ENDO SERVICE >5YRS: HCPCS | Performed by: INTERNAL MEDICINE

## 2023-11-01 PROCEDURE — 43239 EGD BIOPSY SINGLE/MULTIPLE: CPT | Performed by: INTERNAL MEDICINE

## 2023-11-01 PROCEDURE — 83735 ASSAY OF MAGNESIUM: CPT | Performed by: STUDENT IN AN ORGANIZED HEALTH CARE EDUCATION/TRAINING PROGRAM

## 2023-11-01 PROCEDURE — 36415 COLL VENOUS BLD VENIPUNCTURE: CPT | Performed by: STUDENT IN AN ORGANIZED HEALTH CARE EDUCATION/TRAINING PROGRAM

## 2023-11-01 PROCEDURE — 88305 TISSUE EXAM BY PATHOLOGIST: CPT | Mod: TC | Performed by: INTERNAL MEDICINE

## 2023-11-01 PROCEDURE — 80048 BASIC METABOLIC PNL TOTAL CA: CPT | Performed by: STUDENT IN AN ORGANIZED HEALTH CARE EDUCATION/TRAINING PROGRAM

## 2023-11-01 PROCEDURE — 99232 SBSQ HOSP IP/OBS MODERATE 35: CPT | Performed by: INTERNAL MEDICINE

## 2023-11-01 PROCEDURE — 250N000013 HC RX MED GY IP 250 OP 250 PS 637: Performed by: INTERNAL MEDICINE

## 2023-11-01 PROCEDURE — 85027 COMPLETE CBC AUTOMATED: CPT | Performed by: STUDENT IN AN ORGANIZED HEALTH CARE EDUCATION/TRAINING PROGRAM

## 2023-11-01 RX ORDER — NALOXONE HYDROCHLORIDE 0.4 MG/ML
0.2 INJECTION, SOLUTION INTRAMUSCULAR; INTRAVENOUS; SUBCUTANEOUS
Status: DISCONTINUED | OUTPATIENT
Start: 2023-11-01 | End: 2023-11-01 | Stop reason: HOSPADM

## 2023-11-01 RX ORDER — PANTOPRAZOLE SODIUM 40 MG/1
40 TABLET, DELAYED RELEASE ORAL
Status: DISCONTINUED | OUTPATIENT
Start: 2023-11-01 | End: 2023-11-02 | Stop reason: HOSPADM

## 2023-11-01 RX ORDER — FENTANYL CITRATE 50 UG/ML
50-100 INJECTION, SOLUTION INTRAMUSCULAR; INTRAVENOUS EVERY 5 MIN PRN
Status: DISCONTINUED | OUTPATIENT
Start: 2023-11-01 | End: 2023-11-01 | Stop reason: HOSPADM

## 2023-11-01 RX ORDER — ATROPINE SULFATE 0.1 MG/ML
1 INJECTION INTRAVENOUS
Status: DISCONTINUED | OUTPATIENT
Start: 2023-11-01 | End: 2023-11-01 | Stop reason: HOSPADM

## 2023-11-01 RX ORDER — FLUMAZENIL 0.1 MG/ML
0.2 INJECTION, SOLUTION INTRAVENOUS
Status: DISCONTINUED | OUTPATIENT
Start: 2023-11-01 | End: 2023-11-01 | Stop reason: HOSPADM

## 2023-11-01 RX ORDER — DIPHENHYDRAMINE HYDROCHLORIDE 50 MG/ML
25-50 INJECTION INTRAMUSCULAR; INTRAVENOUS
Status: DISCONTINUED | OUTPATIENT
Start: 2023-11-01 | End: 2023-11-01 | Stop reason: HOSPADM

## 2023-11-01 RX ORDER — EPINEPHRINE 1 MG/ML
0.1 INJECTION, SOLUTION INTRAMUSCULAR; SUBCUTANEOUS
Status: DISCONTINUED | OUTPATIENT
Start: 2023-11-01 | End: 2023-11-01 | Stop reason: HOSPADM

## 2023-11-01 RX ORDER — NALOXONE HYDROCHLORIDE 0.4 MG/ML
0.4 INJECTION, SOLUTION INTRAMUSCULAR; INTRAVENOUS; SUBCUTANEOUS
Status: DISCONTINUED | OUTPATIENT
Start: 2023-11-01 | End: 2023-11-01 | Stop reason: HOSPADM

## 2023-11-01 RX ORDER — FLUMAZENIL 0.1 MG/ML
0.2 INJECTION, SOLUTION INTRAVENOUS
Status: ACTIVE | OUTPATIENT
Start: 2023-11-01 | End: 2023-11-02

## 2023-11-01 RX ORDER — LIDOCAINE 40 MG/G
CREAM TOPICAL
Status: DISCONTINUED | OUTPATIENT
Start: 2023-11-01 | End: 2023-11-01 | Stop reason: HOSPADM

## 2023-11-01 RX ORDER — SIMETHICONE 40MG/0.6ML
133 SUSPENSION, DROPS(FINAL DOSAGE FORM)(ML) ORAL
Status: DISCONTINUED | OUTPATIENT
Start: 2023-11-01 | End: 2023-11-01 | Stop reason: HOSPADM

## 2023-11-01 RX ORDER — POTASSIUM CHLORIDE 1500 MG/1
40 TABLET, EXTENDED RELEASE ORAL ONCE
Status: COMPLETED | OUTPATIENT
Start: 2023-11-01 | End: 2023-11-01

## 2023-11-01 RX ADMIN — PANTOPRAZOLE SODIUM 40 MG: 40 TABLET, DELAYED RELEASE ORAL at 17:44

## 2023-11-01 RX ADMIN — TOPICAL ANESTHETIC 0.5 ML: 200 SPRAY DENTAL; PERIODONTAL at 13:02

## 2023-11-01 RX ADMIN — MIDAZOLAM HYDROCHLORIDE 1 MG: 1 INJECTION, SOLUTION INTRAMUSCULAR; INTRAVENOUS at 13:02

## 2023-11-01 RX ADMIN — FENTANYL CITRATE 50 MCG: 50 INJECTION INTRAMUSCULAR; INTRAVENOUS at 13:06

## 2023-11-01 RX ADMIN — POTASSIUM CHLORIDE 40 MEQ: 1500 TABLET, EXTENDED RELEASE ORAL at 11:20

## 2023-11-01 RX ADMIN — MIDAZOLAM HYDROCHLORIDE 1 MG: 1 INJECTION, SOLUTION INTRAMUSCULAR; INTRAVENOUS at 13:07

## 2023-11-01 RX ADMIN — MIDAZOLAM HYDROCHLORIDE 1 MG: 1 INJECTION, SOLUTION INTRAMUSCULAR; INTRAVENOUS at 13:09

## 2023-11-01 RX ADMIN — MIDAZOLAM HYDROCHLORIDE 1 MG: 1 INJECTION, SOLUTION INTRAMUSCULAR; INTRAVENOUS at 13:05

## 2023-11-01 RX ADMIN — PANTOPRAZOLE SODIUM 40 MG: 40 INJECTION, POWDER, FOR SOLUTION INTRAVENOUS at 08:06

## 2023-11-01 RX ADMIN — FENTANYL CITRATE 50 MCG: 50 INJECTION INTRAMUSCULAR; INTRAVENOUS at 13:04

## 2023-11-01 ASSESSMENT — ACTIVITIES OF DAILY LIVING (ADL)
ADLS_ACUITY_SCORE: 22

## 2023-11-01 NOTE — PHARMACY-ADMISSION MEDICATION HISTORY
"Pharmacist Admission Medication History    Admission medication history is complete. The information provided in this note is only as accurate as the sources available at the time of the update.    Information Source(s): Patient and CareEverywhere/SureScripts via in-person    Pertinent Information: Pt states she last took PTA medications \"about a week or so ago\".    Changes made to PTA medication list:  Added: None  Deleted:   Old APAP, zofran, oxycodone, prednisone  Changed: None    Allergies reviewed with patient and updates made in EHR: no    Medication History Completed By: Cristina Barboza RPH 10/31/2023 9:08 PM    PTA Med List   Medication Sig Last Dose    aspirin 81 MG EC tablet Take 81 mg by mouth daily Past Week    rosuvastatin (CRESTOR) 40 MG tablet Take 1 tablet (40 mg) by mouth daily Past Week    VITAMIN D PO Take 1 tablet by mouth daily Past Month       "

## 2023-11-01 NOTE — PROGRESS NOTES
DATE: 11/1/23    TIME OF RECEIPT FROM LAB: 0752    LAB TEST: HgB    LAB VALUE: 6.5    RESULTS GIVEN WITH READ-BACK TO: lab    TIME LAB VALUE REPORTED TO PROVIDER: Dr. Gallego via [facility]

## 2023-11-01 NOTE — PLAN OF CARE
Goal Outcome Evaluation:         Vital Signs: WNL.   Pain/Comfort: patient stating that she is in no pain at this time. Patient encouraged to call RN if she starts having pain. Patient stated an understanding.   Assessment: WNL. PIV site c/d/I and flushed. IVF started per MAR.   Diet: patient tolerating clears. NPO at 0000.   Output: patient voiding independently.   Activity/Ambulation: patient up with SBA. Patient reminded to call when needing to get OOB. Patient stated an understanding.   Social: patient calm and cooperative.   Plan: Monitor VS. Maintain PIV. IVF. Monitor I&O. NPO at 0000. Will continue to monitor and will notify service with any questions or concerns.
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
## 2023-11-01 NOTE — PROGRESS NOTES
[facility]  Hospitalist Progress Note  Liset Gallego MD 11/01/2023    Reason for Stay (Diagnosis): GIB         Assessment and Plan:      Summary of Stay: Rich Terry is a 68 year old female with a history of hlp, thrombophlebitis and varicose veins of the LEs, OA, covid infection about 3 weeks ago  admitted on 10/31/2023 with GIB.    She had coffee ground emesis on 10/27 while in [facility].  She was seen in the ER and recs were to come in and they would monitor her over the WE and then perform EGD 10/30.   She decided against staying and the MD was apparently not against it.  She was flying home to [place] anyway on that day.     Since then was having melena on 10/30 but that has resolved but came to our ER 10/31 for evaluation     She takes 6 tabs of ibuprofen daily  for OA pain.  She is also on a baby asa every day  Hgb 9.0 in [facility]->7.5->6.5      Problem List:   GIB  ABL anemia  Appreciate [facility] input, EGD today  Hgb < 7.0, discussed risks and benefits of transfusion and she is in agreement with getting one       *blood consent signed and in chart  Serial hgb with instructions to transfuse < 7.0       *PPI bid    Hlp  Hold asa and statin for now til EGD results known  Can resume rosuvastatin when able to eat   Can likely resume asa in the next 1-2 weeks    Thrombophlebitis    osteoarthritis  No more NSAIDs at this time      *consider celicoxib in the future for pain management     Covid   S/p 4 shot pfizer series 6/2022    DVT Prophylaxis: Pneumatic Compression Devices  Code Status: DNR  Functional Status: I believe independent  Diet: NPO for now, await EGD results  Perkins: not needed  Access PIV    Disposition Plan   Expected discharge in 1-2 days days to prior living arrangement once above assessed and treated and with stable hgb.     Entered: Liset Gallego MD 11/01/2023, 12:52 PM       Securely message with [employer] (more info)  Text page via [facility] Paging/Directory       I spent 45 minutes reviewing epic  "including prior labs/imaging/medical history and notes related to this encounter  In addition time was spent in interveiwing the patient, communicating with contacts, and medical decision making      Interval History (Subjective):      Tired/weak, THOMAS, palpitations with minimal exertion.  No abdominal pain.  No n/v, no melena                  Physical Exam:      Last Vital Signs:  /66 (BP Location: Right arm)   Pulse 79   Temp 98  F (36.7  C) (Oral)   Resp 13   Ht 1.727 m (5' 8\")   Wt 98.8 kg (217 lb 13 oz)   LMP 09/11/2005   SpO2 99%   BMI 33.12 kg/m      I/O:  Pleasant nad looks stated age head nc/at sclera clear lungs ctab nl effort rrr no mrg no le edema skin warm and dry no cyanosis or clubbing alert and oriented affect appropriate          Medications:      All current medications were reviewed with changes reflected in problem list.         Data:      All new lab and imaging data was reviewed.   Labs:  Recent Labs   Lab 11/01/23  0725      POTASSIUM 3.4   CHLORIDE 109*   CO2 25   ANIONGAP 5*   *   BUN 9.5   CR 0.73   GFRESTIMATED 89   CONSTANTINE 8.7*     Recent Labs   Lab 11/01/23  0725   WBC 9.1   HGB 6.5*   HCT 20.3*   MCV 98        Recent Labs   Lab 11/01/23  0725 10/31/23  1500   * 113*     Recent Labs   Lab 11/01/23  0725 10/31/23  1500   HGB 6.5* 7.5*     Recent Labs   Lab 10/31/23  1500   AST 22   ALT 9   ALKPHOS 74   BILITOTAL 0.2      Imaging:   Results for orders placed or performed during the hospital encounter of 09/18/23   Chest XR,  PA & LAT    Narrative    EXAM: XR CHEST 2 VIEWS  LOCATION: [facility]  DATE: 9/18/2023    INDICATION: fever, chills  COMPARISON: None.      Impression    IMPRESSION:     Heart size is normal. Lungs are clear bilaterally. Mediastinum and visualized bony structures are unremarkable.         "

## 2023-11-01 NOTE — PLAN OF CARE
Goal Outcome Evaluation:  Updates: Rich is doing well today. VSS. Independent in room. Pt rested comfortably between cares. EGD done this afternoon, biopsies pending.   Labs: 1U PRBCs admin at 0830, HgB recheck was 8.2 @1500. K of 3.3 replaced x1 via oral tablets, recheck 3.9.  Resp: WDL  GI/: WDL, +flatus, no BM this shift, +BS. Denies N/V. Tolerating regular diet. Voiding WDL.  IV: WDL L AC SL, flushes well.  Pain/Comfort: Denies 0/10.  Skin: WDL  Plan: Recheck labs in AM, protonix per MAR, and provide for comfort and needs.

## 2023-11-01 NOTE — PLAN OF CARE
Goal Outcome Evaluation:         Patient admitted to room 241 from ED. Initial nursing assessment and admission questions completed. Patient oriented to room and unit procedures. VSS. Patient stating that she is in no pain at this time. Patient encouraged to call RN if patient starts having increased pain. Patient stated an understanding. PIV site c/d/I and flushed. Patient reminded of NPO status at 0000 and clear liquid diet until then. Patient stated an understanding. Patient reminded to call RN when needing to ambulate to the bathroom. Patient stated an understanding. Patient is resting comfortably. Will continue to monitor and will notify service with any questions or concerns.

## 2023-11-02 VITALS
HEART RATE: 80 BPM | BODY MASS INDEX: 33.01 KG/M2 | TEMPERATURE: 98 F | HEIGHT: 68 IN | OXYGEN SATURATION: 98 % | WEIGHT: 217.81 LBS | SYSTOLIC BLOOD PRESSURE: 107 MMHG | DIASTOLIC BLOOD PRESSURE: 93 MMHG | RESPIRATION RATE: 14 BRPM

## 2023-11-02 LAB
ANION GAP SERPL CALCULATED.3IONS-SCNC: 6 MMOL/L (ref 7–15)
BUN SERPL-MCNC: 6.9 MG/DL (ref 8–23)
CALCIUM SERPL-MCNC: 8.7 MG/DL (ref 8.8–10.2)
CHLORIDE SERPL-SCNC: 110 MMOL/L (ref 98–107)
CREAT SERPL-MCNC: 0.7 MG/DL (ref 0.51–0.95)
DEPRECATED HCO3 PLAS-SCNC: 25 MMOL/L (ref 22–29)
EGFRCR SERPLBLD CKD-EPI 2021: >90 ML/MIN/1.73M2
ERYTHROCYTE [DISTWIDTH] IN BLOOD BY AUTOMATED COUNT: 15.2 % (ref 10–15)
GLUCOSE SERPL-MCNC: 102 MG/DL (ref 70–99)
HCT VFR BLD AUTO: 24.5 % (ref 35–47)
HGB BLD-MCNC: 7.9 G/DL (ref 11.7–15.7)
MAGNESIUM SERPL-MCNC: 2.1 MG/DL (ref 1.7–2.3)
MCH RBC QN AUTO: 31 PG (ref 26.5–33)
MCHC RBC AUTO-ENTMCNC: 32.2 G/DL (ref 31.5–36.5)
MCV RBC AUTO: 96 FL (ref 78–100)
PATH REPORT.COMMENTS IMP SPEC: NORMAL
PATH REPORT.COMMENTS IMP SPEC: NORMAL
PATH REPORT.FINAL DX SPEC: NORMAL
PATH REPORT.GROSS SPEC: NORMAL
PATH REPORT.MICROSCOPIC SPEC OTHER STN: NORMAL
PATH REPORT.RELEVANT HX SPEC: NORMAL
PHOTO IMAGE: NORMAL
PLATELET # BLD AUTO: 261 10E3/UL (ref 150–450)
POTASSIUM SERPL-SCNC: 3.7 MMOL/L (ref 3.4–5.3)
RBC # BLD AUTO: 2.55 10E6/UL (ref 3.8–5.2)
SODIUM SERPL-SCNC: 141 MMOL/L (ref 135–145)
WBC # BLD AUTO: 9.1 10E3/UL (ref 4–11)

## 2023-11-02 PROCEDURE — 85027 COMPLETE CBC AUTOMATED: CPT | Performed by: INTERNAL MEDICINE

## 2023-11-02 PROCEDURE — 99239 HOSP IP/OBS DSCHRG MGMT >30: CPT | Performed by: INTERNAL MEDICINE

## 2023-11-02 PROCEDURE — 36415 COLL VENOUS BLD VENIPUNCTURE: CPT | Performed by: INTERNAL MEDICINE

## 2023-11-02 PROCEDURE — 250N000013 HC RX MED GY IP 250 OP 250 PS 637: Performed by: INTERNAL MEDICINE

## 2023-11-02 PROCEDURE — 83735 ASSAY OF MAGNESIUM: CPT | Performed by: STUDENT IN AN ORGANIZED HEALTH CARE EDUCATION/TRAINING PROGRAM

## 2023-11-02 PROCEDURE — 80048 BASIC METABOLIC PNL TOTAL CA: CPT | Performed by: INTERNAL MEDICINE

## 2023-11-02 RX ORDER — PANTOPRAZOLE SODIUM 40 MG/1
40 TABLET, DELAYED RELEASE ORAL
Qty: 60 TABLET | Refills: 2 | Status: SHIPPED | OUTPATIENT
Start: 2023-11-02 | End: 2024-03-15

## 2023-11-02 RX ORDER — FERROUS SULFATE 325(65) MG
325 TABLET ORAL EVERY OTHER DAY
Qty: 30 TABLET | Refills: 0 | Status: SHIPPED | OUTPATIENT
Start: 2023-11-09 | End: 2024-01-30

## 2023-11-02 RX ORDER — ASPIRIN 81 MG/1
81 TABLET ORAL DAILY
Start: 2023-11-09 | End: 2024-01-30

## 2023-11-02 RX ADMIN — PANTOPRAZOLE SODIUM 40 MG: 40 TABLET, DELAYED RELEASE ORAL at 08:41

## 2023-11-02 ASSESSMENT — ACTIVITIES OF DAILY LIVING (ADL)
ADLS_ACUITY_SCORE: 22

## 2023-11-02 NOTE — PLAN OF CARE
Goal Outcome Evaluation:  Patient met discharge criteria. VSS, HgB 7.9 this morning. Tolerating regular diet, moving independently. Discharge education completed, questions encouraged and answered. IV removed without issue. Patient discharged to home with , belongings, and medications at 1030.

## 2023-11-02 NOTE — PLAN OF CARE
Goal Outcome Evaluation:         Vital Signs: WNL.   Pain/Comfort: patient stating that she is in no pain at this time. Patient encouraged to call RN if she starts having pain. Patient stated an understanding.   Assessment: WNL. PIV site c/d/I.   Diet: patient tolerating PO intake. Fluids encouraged as tolerated.   Output: patient voiding independently.   Activity/Ambulation: patient up independently in room. Ambulating in hallways. Patient encouraged to call RN if she starts having any dizziness. Patient stated an understanding.   Social: patient calm and cooperative.   Plan: Monitor VS. Maintain PIV. Monitor I&O. Labs in the AM. Will continue to monitor and will notify service with any questions or concerns.

## 2023-11-02 NOTE — DISCHARGE SUMMARY
Discharge Summary  Hospitalist Service    Rich Terry MRN# [id number]   YOB: 1955 Age: 68 year old     Date of Admission:  10/31/2023  Date of Discharge:  11/2/2023  Admitting Physician:  No admitting provider for patient encounter.  Discharge Physician: Liset Glalego MD  Discharging Service: Hospitalist Service     Primary Provider: Ragini Stevens  Primary Care Physician Phone Number: [phone]         Discharge Diagnoses/Problem Oriented Hospital Course (Providers):      Rich Terry was admitted on 10/31/2023 by No admitting provider for patient encounter. and I would refer you to their history and physical.  The following problems were addressed during her hospitalization:    GIB 2/2 gastric ulcers from NSAIDs  ABL anemia  hlp  Hx of thrombophlebitis  Hx of osteoarthritis      Summary of Stay: Rich Terry is a 68 year old female with a history of hlp, thrombophlebitis and varicose veins of the LEs, OA, covid infection about 3 weeks ago  admitted on 10/31/2023 with GIB.     She had coffee ground emesis on 10/27 while in [facility].  She was seen in the ER and recs were to come in and they would monitor her over the WE and then perform EGD 10/30.   She decided against staying and the MD was apparently not against it.  She was flying home to [place] anyway on that day.      Since then was having melena on 10/30 but that has resolved but came to our ER 10/31 for evaluation      She takes 6 tabs of ibuprofen daily  for OA pain.  She is also on a baby asa every day  Hgb 9.0 in [facility]->7.5->6.5: 1 U PRBCs->8.2->7.9 @ discharge     She is feeling well and tolerating a regular diet and ready for discharge         Problem List:   GIB  ABL anemia  Appreciate [facility] input, EGD 11/1 revealed non-bleeding gastric ulcers, suspected due to NSAIDs       *bid PPI for 2 months, repeat EGD in 2 months for follow-up        * No NSAIDs  Hgb < 7.0, discussed risks and benefits of transfusion and she is in  agreement with getting one  S/p 1 unit(s) prbc's with appropriate response 11/1      * FeSO4 every other day for one month     Hlp  Asa and statin held while in hospital  Ok to resume rosuvastatin at discharge  Ok to resume asa in one week      Thrombophlebitis     osteoarthritis  No more NSAIDs at this time      *consider celicoxib in the future for pain management      Covid   S/p 4 shot pfizer series 6/2022     DVT Prophylaxis: Pneumatic Compression Devices  Code Status: DNR  Functional Status: I believe independent  Diet: NPO for now, await EGD results  Perkins: not needed  Access PIV            Code Status:      DNR        Brief Hospital Stay Summary Sent Home With Patient in AVS:          Reason for your hospital stay      Bleeding stomach ulcers                     Important Results:        As noted below         Pending Results:        Unresulted Labs Ordered in the Past 30 Days of this Admission       Date and Time Order Name Status Description    11/2/2023 12:02 AM Basic metabolic panel In process     11/2/2023 12:02 AM Magnesium In process     11/1/2023  1:19 PM Surgical Pathology Exam In process               Discharge Instructions and Follow-Up:      Follow-up Appointments     Follow-up and recommended labs and tests       You can resume your low dose aspirin in a week  No other NSAIDs  Please start iron tabs every other day beginning in one week    Follow-up with [facility] in 2 months for repeat endoscopy  You should have your hemoglobin rechecked in ~2 weeks              Discharge Disposition:        Discharged to home         Discharge Medications:        Current Discharge Medication List        START taking these medications    Details   ferrous sulfate (FEROSUL) 325 (65 Fe) MG tablet Take 1 tablet (325 mg) by mouth every other day  Qty: 30 tablet, Refills: 0    Associated Diagnoses: Gastrointestinal hemorrhage associated with gastric ulcer      pantoprazole (PROTONIX) 40 MG EC tablet Take 1 tablet (40 mg)  "by mouth 2 times daily (before meals)  Qty: 60 tablet, Refills: 2    Associated Diagnoses: Gastrointestinal hemorrhage associated with gastric ulcer           CONTINUE these medications which have CHANGED    Details   aspirin 81 MG EC tablet Take 1 tablet (81 mg) by mouth daily    Associated Diagnoses: Gastrointestinal hemorrhage associated with gastric ulcer           CONTINUE these medications which have NOT CHANGED    Details   rosuvastatin (CRESTOR) 40 MG tablet Take 1 tablet (40 mg) by mouth daily  Qty: 90 tablet, Refills: 3    Associated Diagnoses: Coronary artery disease involving native coronary artery of native heart without angina pectoris      VITAMIN D PO Take 1 tablet by mouth daily           STOP taking these medications       acetaminophen (TYLENOL) 325 MG tablet Comments:   Reason for Stopping:         oxyCODONE (ROXICODONE) 5 MG tablet Comments:   Reason for Stopping:                 Allergies:         Allergies   Allergen Reactions    Levaquin Muscle Pain (Myalgia)    Shrimp      throat swells    Sulfa Antibiotics Rash     rash           Consultations This Hospital Stay:        [facility]         Condition and Physical on Discharge:        Discharge condition: Stable   Vitals: Blood pressure (!) 107/93, pulse 80, temperature 98  F (36.7  C), temperature source Oral, resp. rate 14, height 1.727 m (5' 8\"), weight 98.8 kg (217 lb 13 oz), last menstrual period 09/11/2005, SpO2 98%, not currently breastfeeding.     Constitutional: Pleasant nad looks stated age head nc/at sclera clear     Lungs: Ctab nl effort   Cardiovascular: Rrr no mrg no le edema   Abdomen: S/nt/nd and tolerating po   Skin: Warm and dry no cc   Other: Alert and oriented affect appropriate goff          Discharge Time:      Greater than 30 minutes.        Image Results From This Hospital Stay (For Non-EPIC Providers):        Results for orders placed or performed during the hospital encounter of 09/18/23   Chest XR,  PA & LAT    Narrative    " EXAM: XR CHEST 2 VIEWS  LOCATION: [facility]  DATE: 9/18/2023    INDICATION: fever, chills  COMPARISON: None.      Impression    IMPRESSION:     Heart size is normal. Lungs are clear bilaterally. Mediastinum and visualized bony structures are unremarkable.           Most Recent Lab Results In EPIC (For Non-EPIC Providers):    Most Recent 3 CBC's:  Recent Labs   Lab Test 11/02/23  0842 11/01/23  1500 11/01/23  0725 10/31/23  1500   WBC 9.1  --  9.1 9.6   HGB 7.9* 8.2* 6.5* 7.5*   MCV 96  --  98 96     --  254 316      Most Recent 3 BMP's:  Recent Labs   Lab Test 11/01/23  1500 11/01/23 0725 10/31/23  1500 09/18/23 2022   NA  --  139 134* 135*   POTASSIUM 3.9 3.4 3.6 3.3*   CHLORIDE  --  109* 104 97*   CO2  --  25 23 26   BUN  --  9.5 12.4 8.6   CR  --  0.73 0.71 0.96*   ANIONGAP  --  5* 7 12   CONSTANTINE  --  8.7* 9.1 9.5   GLC  --  102* 113* 130*       Most Recent 2 LFT's:  Recent Labs   Lab Test 10/31/23  1500 09/18/23 2022   AST 22 37   ALT 9 21   ALKPHOS 74 97   BILITOTAL 0.2 0.2

## 2023-11-03 ENCOUNTER — PATIENT OUTREACH (OUTPATIENT)
Dept: CARE COORDINATION | Facility: CLINIC | Age: 68
End: 2023-11-03
Payer: MEDICARE

## 2023-11-03 NOTE — PROGRESS NOTES
"[facility] offered Clinic Care Coordination to an established [facility] eligible patient and patient declined CCC at this time.     Clinic Care Coordination Contact  [facility]  SITUATION                                                      Admission:    Admission Date: 10/31/23   Reason for Admission: Rectal bleeding, Generalized weakness  Discharge:   Discharge Date: 11/02/23  Discharge Diagnosis: GIB 2/2 gastric ulcers from NSAIDs, ABL anemia, hlp, Hx of thrombophlebitis  Hx of osteoarthritis    BACKGROUND                                                      Per hospital discharge summary and inpatient provider notes:      Rich Terry is a 68 year old female with history of gastroesophageal reflux disease who presents to the ED with hematemesis, bloody stool and generalized weakness. Patient was in [place] on Friday when she suddenly began to feel sick while at some races. She had multiple episodes of vomit that was black and tarry. Later on, patient had some potatoes and chicken before having more vomiting episodes that were bright red. She additionally has had multiple episodes of diarrhea with bloody stool. Patient the next day went to the [facility] in [place], but due to travel plans, denied admission. Patient additionally endorses some shortness of breath and palpitations with activity.  Since Saturday, her stools have been dark black in color. After coming back to [place], patient was recommended to come to ED. Patient notes generalized weakness as well during this time. Patient also states that she has been taking a lot of Ibuprofen for pain during this time to keep up with family. Patient reports her hemoglobin is usually 12-13 and was 7.5 in the ED. Denies liver disease, stomach ulcers, and alcohol use.     ASSESSMENT      Discharge Assessment  How are you doing now that you are home?: \"I'm feeling better thank you. At the moment I guess I don't really have any " "questions. I wanted to say the Nursing staff I had there were phenomenal.\"  How are your symptoms? (Red Flag symptoms escalate to triage hotline per guidelines): Improved  Do you feel your condition is stable enough to be safe at home until your provider visit?: Yes  Does the patient have their discharge instructions? : Yes  Does the patient have questions regarding their discharge instructions? : No  Were you started on any new medications or were there changes to any of your previous medications? : Yes  Does the patient have all of their medications?: Yes  Do you have questions regarding any of your medications? : No  Do you have all of your needed medical supplies or equipment (DME)?  (i.e. oxygen tank, CPAP, cane, etc.): Yes  Discharge follow-up appointment scheduled within 14 calendar days? : No (Pt plans to contact her primary clinic today to schedule LABs in two weeks)  Is patient agreeable to assistance with scheduling? : Yes    Post-op ([facility] CTA Only)  If the patient had a surgery or procedure, do they have any questions for a nurse?: No    PLAN                                                      Outpatient Plan:     Follow-up and recommended labs and tests  You can resume your low dose aspirin in a week  No other NSAIDs  Please start iron tabs every other day beginning in one week  Follow-up with [facility] in 2 months for repeat endoscopy  You should have your hemoglobin rechecked in ~2 weeks    Future Appointments   Date Time Provider Department Center   12/4/2023  8:00 AM RU LAB [facility] [place] [facility]   12/5/2023  1:30 PM Halle Callaway APRN CNP [facility]   4/17/2024  1:20 PM Lupillo Celaya MD [facility]   4/17/2024  2:00 PM Verna Steen AuD [facility]I [facility]         For any urgent concerns, please contact our 24 hour nurse triage line: [phone] ([phone])         GAVIN Palencia  [phone]  [facility]               "

## 2023-11-04 ENCOUNTER — HEALTH MAINTENANCE LETTER (OUTPATIENT)
Age: 68
End: 2023-11-04

## 2023-11-16 ENCOUNTER — TELEPHONE (OUTPATIENT)
Dept: FAMILY MEDICINE | Facility: CLINIC | Age: 68
End: 2023-11-16

## 2023-11-16 DIAGNOSIS — D62 ANEMIA DUE TO BLOOD LOSS, ACUTE: Primary | ICD-10-CM

## 2023-12-03 NOTE — PROGRESS NOTES
HISTORY OF PRESENT ILLNESS:     This is a 68 year old female who follows with Dr Wilson at [facility]  Her past medical history includes:  Right bundle branch block, obesity, coronary artery disease, hyperlipidemia     Ms Terry has a history of an abnormal stress test (2006) demonstrating a moderate-sized anterior defect concerning for ischemia, but was having atypical symptoms and it was felt that her body habitus skewed the results  A CT angiogram was recommended, but not done at that time.     She was seen in clinic (3/2023) for preoperative clearance for knee surgery and a CT angiogram and ECHO were arranged     CTA (3/17/23) showed mild nonobstructive coronary artery disease  Calcium score: 302     ECHO (3/27/23) showed LVEF 55-60%, normal RV function, no significant valvular pathology, mild ascending aorta dilatation    Following her knee surgery, she was started on a statin due to underlying     She was seen in the ED last month for GI bleed due to non-bleeding gastric ulcers for NSAID use.  Hemoglobin was down to 6.5  Her aspirin was temporarily held and she was started on Omeprazole      Our visit today is for reassessment and labs       Ms Terry overall has better energy  She denies any chest pain, shortness of breath, palpitations, orthopnea, or peripheral edema. Her hemoglobin count is now 11.7  She is motivated to lose weight and increase her physical activity  She is pleased that her cholesterol numbers have improved           VITAL SIGNS:  BP:  120/64  Pulse: 90  Weight: 228 lbs  (BMI: 34)    Labs (12/4/23)  Total cholesterol: 177  triglycerides: 86  HDL: 104  LDL: 56  ALT: 20        IMPRESSION AND PLAN:     Coronary Artery Disease:  -mild per CTA (3/2023)  Calcium score: 302  -no angina  -on statin, ASA    Hyperlipidemia:  -on Crestor 40 mg    -LDL  56    Chronic Right bundle branch block    The total time for the visit today was 27 minutes which includes patient visit,  reviewing of records, discussion, and placing of orders of the outpatient coordination of cardiovascular care as described.  The level of medical decision making during this visit was of mild complexity.  Thank you for allowing me to participate in their care.  Return in 9 months with fasting labs    Orders Placed This Encounter   Procedures    Lipid Profile    ALT    Follow-Up with Cardiology       No orders of the defined types were placed in this encounter.      Medications Discontinued During This Encounter   Medication Reason    traMADol (ULTRAM) 50 MG tablet Therapy completed (No AVS)         Encounter Diagnosis   Name Primary?    Coronary artery disease involving native coronary artery of native heart without angina pectoris        CURRENT MEDICATIONS:  Current Outpatient Medications   Medication Sig Dispense Refill    ferrous sulfate (FEROSUL) 325 (65 Fe) MG tablet Take 1 tablet (325 mg) by mouth every other day 30 tablet 0    pantoprazole (PROTONIX) 40 MG EC tablet Take 1 tablet (40 mg) by mouth 2 times daily (before meals) 60 tablet 2    rosuvastatin (CRESTOR) 40 MG tablet Take 1 tablet (40 mg) by mouth daily 90 tablet 3    aspirin 81 MG EC tablet Take 1 tablet (81 mg) by mouth daily (Patient not taking: Reported on 12/5/2023)      VITAMIN D PO Take 1 tablet by mouth daily         ALLERGIES     Allergies   Allergen Reactions    Levaquin Muscle Pain (Myalgia)    Shrimp      throat swells    Sulfa Antibiotics Rash     rash       PAST MEDICAL HISTORY:  Past Medical History:   Diagnosis Date    Gastrointestinal hemorrhage associated with gastric ulcer 11/2023    d/d NSAIDs    Generalized osteoarthrosis, unspecified site     Pain in right knee     Phlebitis and thrombophlebitis of superficial vessels of lower extremities     several episodes    Thrombosis of leg     superficial, not deep    Varicose veins of lower extremities with ulcer (H)        PAST SURGICAL HISTORY:  Past Surgical History:   Procedure  Laterality Date    ARTHROPLASTY KNEE Right 2015    Procedure: ARTHROPLASTY KNEE;  Surgeon: Prasad Valladares MD;  Location: [facility] OR    ESOPHAGOSCOPY, GASTROSCOPY, DUODENOSCOPY (EGD), COMBINED N/A 2023    Procedure: Esophagoscopy, gastroscopy, duodenoscopy (EGD), combined biopsies of stomach with regular forceps;  Surgeon: Joey Paredes MD;  Location: [facility] GI    EXCHANGE POLY COMPONENT ARTHROPLASTY KNEE Left 2023    Procedure: Polyethylene exchange of left total knee arthroplasty using Dayanna NexGen LPS knee;  Surgeon: Prasad Valladares MD;  Location: [facility] OR    saphenous vein radiofrequency ablation[  2012    SURGICAL HISTORY OF -       rt LE varicose vein procedure    SURGICAL HISTORY OF -   05    Tibial osteotomy (lt leg)    varicose vein avulsions/stab phlebectomy[  2012    [facility] NONSPECIFIC PROCEDURE      4 knee surgeries (all left knee)    [facility] NONSPECIFIC PROCEDURE      (L) planter fascia surgery       FAMILY HISTORY:  Family History   Problem Relation Age of Onset    Cancer Mother         cervical ca age 36    Heart Disease Mother         A-FIB    Skin Cancer Mother         on nose    Heart Disease Father          CHF age 51    Diabetes Maternal Grandfather     Cancer Maternal Grandmother         uterine ca dx in her 60's    Diabetes Paternal Grandfather     Hypertension Brother     Cancer Maternal Aunt         ovarian ca in her 40's    Diabetes Paternal Uncle         multiple Puncles with DM       SOCIAL HISTORY:  Social History     Socioeconomic History    Marital status:      Spouse name: None    Number of children: None    Years of education: None    Highest education level: None   Tobacco Use    Smoking status: Former     Packs/day: 1.00     Years: 10.00     Additional pack years: 0.00     Total pack years: 10.00     Types: Cigarettes     Start date:      Quit date:      Years since quittin.9    Smokeless tobacco: Never   Vaping Use     Vaping Use: Never used   Substance and Sexual Activity    Alcohol use: Yes     Comment: 1-2 drinks a month    Drug use: No    Sexual activity: Yes     Partners: Male   Other Topics Concern    Parent/sibling w/ CABG, MI or angioplasty before 65F 55M? Yes     Social Determinants of Health     Financial Resource Strain: Low Risk  (12/1/2023)    Financial Resource Strain     Within the past 12 months, have you or your family members you live with been unable to get utilities (heat, electricity) when it was really needed?: No   Food Insecurity: Low Risk  (12/1/2023)    Food Insecurity     Within the past 12 months, did you worry that your food would run out before you got money to buy more?: No     Within the past 12 months, did the food you bought just not last and you didn t have money to get more?: No   Transportation Needs: Low Risk  (12/1/2023)    Transportation Needs     Within the past 12 months, has lack of transportation kept you from medical appointments, getting your medicines, non-medical meetings or appointments, work, or from getting things that you need?: No   Interpersonal Safety: Low Risk  (12/5/2023)    Interpersonal Safety     Do you feel physically and emotionally safe where you currently live?: Yes     Within the past 12 months, have you been hit, slapped, kicked or otherwise physically hurt by someone?: No     Within the past 12 months, have you been humiliated or emotionally abused in other ways by your partner or ex-partner?: No   Housing Stability: Low Risk  (12/1/2023)    Housing Stability     Do you have housing? : Yes     Are you worried about losing your housing?: No       Review of Systems:  Skin:          Eyes:         ENT:         Respiratory:  Positive for dyspnea on exertion     Cardiovascular:    Positive for;palpitations;edema    Gastroenterology:        Genitourinary:         Musculoskeletal:         Neurologic:         Psychiatric:         Heme/Lymph/Imm:         Endocrine:            Physical Exam:  Vitals: /64 (BP Location: Right arm, Patient Position: Sitting, Cuff Size: Adult Large)   Pulse 90   Wt 103.4 kg (228 lb)   LMP 09/11/2005   SpO2 94%   BMI 34.67 kg/m      Constitutional:  cooperative obese      Skin:  warm and dry to the touch          Head:  normocephalic        Eyes:  pupils equal and round        Lymph:      ENT:  no pallor or cyanosis        Neck:  no carotid bruit        Respiratory:  clear to auscultation;normal respiratory excursion         Cardiac: regular rhythm;normal S1 and S2     no presence of murmur          pulses full and equal                                        GI:    obese      Extremities and Muscular Skeletal:          LLE edema;trace;1+ significant right lower extremity varicose veins    Neurological:  affect appropriate        Psych:  Alert and Oriented x 3          CC  DUTCH Barton CNP  [address]  [place] [id number]

## 2023-12-04 ENCOUNTER — LAB (OUTPATIENT)
Dept: LAB | Facility: CLINIC | Age: 68
End: 2023-12-04
Payer: MEDICARE

## 2023-12-04 DIAGNOSIS — I25.10 CORONARY ARTERY DISEASE INVOLVING NATIVE CORONARY ARTERY OF NATIVE HEART WITHOUT ANGINA PECTORIS: ICD-10-CM

## 2023-12-04 LAB
ALT SERPL W P-5'-P-CCNC: 20 U/L (ref 0–50)
CHOLEST SERPL-MCNC: 177 MG/DL
HDLC SERPL-MCNC: 104 MG/DL
LDLC SERPL CALC-MCNC: 56 MG/DL
NONHDLC SERPL-MCNC: 73 MG/DL
TRIGL SERPL-MCNC: 86 MG/DL

## 2023-12-04 PROCEDURE — 80061 LIPID PANEL: CPT | Performed by: NURSE PRACTITIONER

## 2023-12-04 PROCEDURE — 36415 COLL VENOUS BLD VENIPUNCTURE: CPT | Performed by: NURSE PRACTITIONER

## 2023-12-04 PROCEDURE — 84460 ALANINE AMINO (ALT) (SGPT): CPT | Performed by: NURSE PRACTITIONER

## 2023-12-05 ENCOUNTER — OFFICE VISIT (OUTPATIENT)
Dept: FAMILY MEDICINE | Facility: CLINIC | Age: 68
End: 2023-12-05
Payer: MEDICARE

## 2023-12-05 ENCOUNTER — OFFICE VISIT (OUTPATIENT)
Dept: CARDIOLOGY | Facility: CLINIC | Age: 68
End: 2023-12-05
Attending: NURSE PRACTITIONER
Payer: MEDICARE

## 2023-12-05 VITALS
DIASTOLIC BLOOD PRESSURE: 70 MMHG | WEIGHT: 228.3 LBS | HEIGHT: 68 IN | OXYGEN SATURATION: 99 % | HEART RATE: 76 BPM | SYSTOLIC BLOOD PRESSURE: 112 MMHG | RESPIRATION RATE: 12 BRPM | BODY MASS INDEX: 34.6 KG/M2 | TEMPERATURE: 97.7 F

## 2023-12-05 VITALS
BODY MASS INDEX: 34.67 KG/M2 | WEIGHT: 228 LBS | DIASTOLIC BLOOD PRESSURE: 64 MMHG | HEART RATE: 90 BPM | OXYGEN SATURATION: 94 % | SYSTOLIC BLOOD PRESSURE: 120 MMHG

## 2023-12-05 DIAGNOSIS — M15.9 OSTEOARTHRITIS OF MULTIPLE JOINTS, UNSPECIFIED OSTEOARTHRITIS TYPE: ICD-10-CM

## 2023-12-05 DIAGNOSIS — D62 ANEMIA DUE TO BLOOD LOSS, ACUTE: ICD-10-CM

## 2023-12-05 DIAGNOSIS — Z00.00 MEDICARE ANNUAL WELLNESS VISIT, SUBSEQUENT: Primary | ICD-10-CM

## 2023-12-05 DIAGNOSIS — I25.10 CORONARY ARTERY DISEASE INVOLVING NATIVE CORONARY ARTERY OF NATIVE HEART WITHOUT ANGINA PECTORIS: ICD-10-CM

## 2023-12-05 DIAGNOSIS — Z12.11 SCREEN FOR COLON CANCER: ICD-10-CM

## 2023-12-05 DIAGNOSIS — E55.9 VITAMIN D DEFICIENCY: ICD-10-CM

## 2023-12-05 LAB
ERYTHROCYTE [DISTWIDTH] IN BLOOD BY AUTOMATED COUNT: 13.7 % (ref 10–15)
FERRITIN SERPL-MCNC: 45 NG/ML (ref 11–328)
HCT VFR BLD AUTO: 35.9 % (ref 35–47)
HGB BLD-MCNC: 11.2 G/DL (ref 11.7–15.7)
IRON BINDING CAPACITY (ROCHE): 319 UG/DL (ref 240–430)
IRON SATN MFR SERPL: 12 % (ref 15–46)
IRON SERPL-MCNC: 39 UG/DL (ref 37–145)
MCH RBC QN AUTO: 29.7 PG (ref 26.5–33)
MCHC RBC AUTO-ENTMCNC: 31.2 G/DL (ref 31.5–36.5)
MCV RBC AUTO: 95 FL (ref 78–100)
PLATELET # BLD AUTO: 355 10E3/UL (ref 150–450)
RBC # BLD AUTO: 3.77 10E6/UL (ref 3.8–5.2)
WBC # BLD AUTO: 7.7 10E3/UL (ref 4–11)

## 2023-12-05 PROCEDURE — 99213 OFFICE O/P EST LOW 20 MIN: CPT | Performed by: NURSE PRACTITIONER

## 2023-12-05 PROCEDURE — 85027 COMPLETE CBC AUTOMATED: CPT | Performed by: FAMILY MEDICINE

## 2023-12-05 PROCEDURE — 83540 ASSAY OF IRON: CPT | Performed by: FAMILY MEDICINE

## 2023-12-05 PROCEDURE — 36415 COLL VENOUS BLD VENIPUNCTURE: CPT | Performed by: FAMILY MEDICINE

## 2023-12-05 PROCEDURE — 99214 OFFICE O/P EST MOD 30 MIN: CPT | Mod: 25 | Performed by: FAMILY MEDICINE

## 2023-12-05 PROCEDURE — 83550 IRON BINDING TEST: CPT | Performed by: FAMILY MEDICINE

## 2023-12-05 PROCEDURE — G0439 PPPS, SUBSEQ VISIT: HCPCS | Performed by: FAMILY MEDICINE

## 2023-12-05 PROCEDURE — 82728 ASSAY OF FERRITIN: CPT | Performed by: FAMILY MEDICINE

## 2023-12-05 RX ORDER — TRAMADOL HYDROCHLORIDE 50 MG/1
50 TABLET ORAL 2 TIMES DAILY
Qty: 60 TABLET | Refills: 0 | Status: SHIPPED | OUTPATIENT
Start: 2023-12-05 | End: 2023-12-05

## 2023-12-05 RX ORDER — RESPIRATORY SYNCYTIAL VIRUS VACCINE 120MCG/0.5
0.5 KIT INTRAMUSCULAR ONCE
Qty: 1 EACH | Refills: 0 | Status: CANCELLED | OUTPATIENT
Start: 2023-12-05 | End: 2023-12-05

## 2023-12-05 ASSESSMENT — PAIN SCALES - GENERAL: PAINLEVEL: MILD PAIN (3)

## 2023-12-05 NOTE — LETTER
12/5/2023    Ragini Stevens MD  [id number]  [facility] [id number]    RE: Rich Terry       Dear Colleague,     I had the pleasure of seeing Rich Terry in the [facility].  HISTORY OF PRESENT ILLNESS:     This is a 68 year old female who follows with Dr Wilson at [facility]  Her past medical history includes:  Right bundle branch block, obesity, coronary artery disease, hyperlipidemia     Ms Terry has a history of an abnormal stress test (2006) demonstrating a moderate-sized anterior defect concerning for ischemia, but was having atypical symptoms and it was felt that her body habitus skewed the results  A CT angiogram was recommended, but not done at that time.     She was seen in clinic (3/2023) for preoperative clearance for knee surgery and a CT angiogram and ECHO were arranged     CTA (3/17/23) showed mild nonobstructive coronary artery disease  Calcium score: 302     ECHO (3/27/23) showed LVEF 55-60%, normal RV function, no significant valvular pathology, mild ascending aorta dilatation    Following her knee surgery, she was started on a statin due to underlying     She was seen in the ED last month for GI bleed due to non-bleeding gastric ulcers for NSAID use.  Hemoglobin was down to 6.5  Her aspirin was temporarily held and she was started on Omeprazole      Our visit today is for reassessment and labs       Ms Terry overall has better energy  She denies any chest pain, shortness of breath, palpitations, orthopnea, or peripheral edema. Her hemoglobin count is now 11.7  She is motivated to lose weight and increase her physical activity  She is pleased that her cholesterol numbers have improved           VITAL SIGNS:  BP:  120/64  Pulse: 90  Weight: 228 lbs  (BMI: 34)    Labs (12/4/23)  Total cholesterol: 177  triglycerides: 86  HDL: 104  LDL: 56  ALT: 20        IMPRESSION AND PLAN:     Coronary Artery Disease:  -mild per CTA (3/2023)  Calcium score:  302  -no angina  -on statin, ASA    Hyperlipidemia:  -on Crestor 40 mg    -LDL  56    Chronic Right bundle branch block    The total time for the visit today was 27 minutes which includes patient visit, reviewing of records, discussion, and placing of orders of the outpatient coordination of cardiovascular care as described.  The level of medical decision making during this visit was of mild complexity.  Thank you for allowing me to participate in their care.  Return in 9 months with fasting labs    Orders Placed This Encounter   Procedures    Lipid Profile    ALT    Follow-Up with Cardiology       No orders of the defined types were placed in this encounter.      Medications Discontinued During This Encounter   Medication Reason    traMADol (ULTRAM) 50 MG tablet Therapy completed (No AVS)         Encounter Diagnosis   Name Primary?    Coronary artery disease involving native coronary artery of native heart without angina pectoris        CURRENT MEDICATIONS:  Current Outpatient Medications   Medication Sig Dispense Refill    ferrous sulfate (FEROSUL) 325 (65 Fe) MG tablet Take 1 tablet (325 mg) by mouth every other day 30 tablet 0    pantoprazole (PROTONIX) 40 MG EC tablet Take 1 tablet (40 mg) by mouth 2 times daily (before meals) 60 tablet 2    rosuvastatin (CRESTOR) 40 MG tablet Take 1 tablet (40 mg) by mouth daily 90 tablet 3    aspirin 81 MG EC tablet Take 1 tablet (81 mg) by mouth daily (Patient not taking: Reported on 12/5/2023)      VITAMIN D PO Take 1 tablet by mouth daily         ALLERGIES     Allergies   Allergen Reactions    Levaquin Muscle Pain (Myalgia)    Shrimp      throat swells    Sulfa Antibiotics Rash     rash       PAST MEDICAL HISTORY:  Past Medical History:   Diagnosis Date    Gastrointestinal hemorrhage associated with gastric ulcer 11/2023    d/d NSAIDs    Generalized osteoarthrosis, unspecified site     Pain in right knee     Phlebitis and thrombophlebitis of superficial vessels of lower  extremities     several episodes    Thrombosis of leg     superficial, not deep    Varicose veins of lower extremities with ulcer (H)        PAST SURGICAL HISTORY:  Past Surgical History:   Procedure Laterality Date    ARTHROPLASTY KNEE Right 2015    Procedure: ARTHROPLASTY KNEE;  Surgeon: Prasad Valladares MD;  Location: [facility] OR    ESOPHAGOSCOPY, GASTROSCOPY, DUODENOSCOPY (EGD), COMBINED N/A 2023    Procedure: Esophagoscopy, gastroscopy, duodenoscopy (EGD), combined biopsies of stomach with regular forceps;  Surgeon: Joey Paredes MD;  Location: [facility]    EXCHANGE POLY COMPONENT ARTHROPLASTY KNEE Left 2023    Procedure: Polyethylene exchange of left total knee arthroplasty using Dayanna NexGen LPS knee;  Surgeon: Prasad Valladares MD;  Location: [facility] OR    saphenous vein radiofrequency ablation[  2012    SURGICAL HISTORY OF -       rt LE varicose vein procedure    SURGICAL HISTORY OF -   05    Tibial osteotomy (lt leg)    varicose vein avulsions/stab phlebectomy[  2012    [facility] NONSPECIFIC PROCEDURE      4 knee surgeries (all left knee)    [facility] NONSPECIFIC PROCEDURE      (L) planter fascia surgery       FAMILY HISTORY:  Family History   Problem Relation Age of Onset    Cancer Mother         cervical ca age 36    Heart Disease Mother         A-FIB    Skin Cancer Mother         on nose    Heart Disease Father          CHF age 51    Diabetes Maternal Grandfather     Cancer Maternal Grandmother         uterine ca dx in her 60's    Diabetes Paternal Grandfather     Hypertension Brother     Cancer Maternal Aunt         ovarian ca in her 40's    Diabetes Paternal Uncle         multiple Puncles with DM       SOCIAL HISTORY:  Social History     Socioeconomic History    Marital status:      Spouse name: None    Number of children: None    Years of education: None    Highest education level: None   Tobacco Use    Smoking status: Former     Packs/day: 1.00     Years:  10.00     Additional pack years: 0.00     Total pack years: 10.00     Types: Cigarettes     Start date:      Quit date:      Years since quittin.9    Smokeless tobacco: Never   Vaping Use    Vaping Use: Never used   Substance and Sexual Activity    Alcohol use: Yes     Comment: 1-2 drinks a month    Drug use: No    Sexual activity: Yes     Partners: Male   Other Topics Concern    Parent/sibling w/ CABG, MI or angioplasty before 65F 55M? Yes     Social Determinants of Health     Financial Resource Strain: Low Risk  (2023)    Financial Resource Strain     Within the past 12 months, have you or your family members you live with been unable to get utilities (heat, electricity) when it was really needed?: No   Food Insecurity: Low Risk  (2023)    Food Insecurity     Within the past 12 months, did you worry that your food would run out before you got money to buy more?: No     Within the past 12 months, did the food you bought just not last and you didn t have money to get more?: No   Transportation Needs: Low Risk  (2023)    Transportation Needs     Within the past 12 months, has lack of transportation kept you from medical appointments, getting your medicines, non-medical meetings or appointments, work, or from getting things that you need?: No   Interpersonal Safety: Low Risk  (2023)    Interpersonal Safety     Do you feel physically and emotionally safe where you currently live?: Yes     Within the past 12 months, have you been hit, slapped, kicked or otherwise physically hurt by someone?: No     Within the past 12 months, have you been humiliated or emotionally abused in other ways by your partner or ex-partner?: No   Housing Stability: Low Risk  (2023)    Housing Stability     Do you have housing? : Yes     Are you worried about losing your housing?: No       Review of Systems:  Skin:          Eyes:         ENT:         Respiratory:  Positive for dyspnea on exertion      Cardiovascular:    Positive for;palpitations;edema    Gastroenterology:        Genitourinary:         Musculoskeletal:         Neurologic:         Psychiatric:         Heme/Lymph/Imm:         Endocrine:           Physical Exam:  Vitals: /64 (BP Location: Right arm, Patient Position: Sitting, Cuff Size: Adult Large)   Pulse 90   Wt 103.4 kg (228 lb)   LMP 09/11/2005   SpO2 94%   BMI 34.67 kg/m      Constitutional:  cooperative obese      Skin:  warm and dry to the touch          Head:  normocephalic        Eyes:  pupils equal and round        Lymph:      ENT:  no pallor or cyanosis        Neck:  no carotid bruit        Respiratory:  clear to auscultation;normal respiratory excursion         Cardiac: regular rhythm;normal S1 and S2     no presence of murmur          pulses full and equal                                        GI:    obese      Extremities and Muscular Skeletal:          LLE edema;trace;1+ significant right lower extremity varicose veins    Neurological:  affect appropriate        Psych:  Alert and Oriented x 3          CC  DUTCH Barton CNP  [address]  [place] [address]                      Thank you for allowing me to participate in the care of your patient.      Sincerely,     DUTCH Burk CNP     [facility]

## 2023-12-05 NOTE — PROGRESS NOTES
"  Assessment & Plan     Medicare annual wellness visit, subsequent  Normal exam  - CBC with platelets; Future  - CBC with platelets    Screen for colon cancer  agrees  - Colonoscopy Screening  Referral; Future    Vitamin D deficiency      Anemia due to blood loss, acute  refill  - CBC with platelets; Future  - Iron and iron binding capacity; Future  - Ferritin; Future  - CBC with platelets  - Iron and iron binding capacity  - Ferritin    Osteoarthritis of multiple joints, unspecified osteoarthritis type  Will treat with prn tramadol, pt will not take it daily    Ordering of each unique test  Prescription drug management  37 minutes spent by me on the date of the encounter doing chart review, history and exam, documentation and further activities per the note     MED REC REQUIRED  Post Medication Reconciliation Status:   BMI:   Estimated body mass index is 34.71 kg/m  as calculated from the following:    Height as of this encounter: 1.727 m (5' 8\").    Weight as of this encounter: 103.6 kg (228 lb 4.8 oz).   Weight management plan: Discussed healthy diet and exercise guidelines    Work on weight loss  Regular exercise    Ragini Stevens MD  [facility] GRACIA Villanueva is a 68 year old, presenting for the following health issues:  Blood Draw (Labs. ) and Referral (colonoscopy)        12/5/2023     9:49 AM   Additional Questions   Roomed by Lisa Magill, CMA   Accompanied by self         12/5/2023     9:49 AM   Patient Reported Additional Medications   Patient reports taking the following new medications NONE     Tco-LEFT KNEE SURGERY in the past, piece broke and needed another surgery, but was not given clearance right away, so was in a lot of pain for months, finally got the surgery, still having pain, ortho says eventually it will improve    While in [facility] was very busy, and 30 yrs of working in [employer], took a lot of ibu, probably to much, woke up when in [place] and " "vomitted up black coffee grounds, and felt better, so went to the races, was so tired and weak, went to the bathroom and was vomitting again around lunch time, it was red and had to get a cart back to the car, 4 hrs later had diarrhea at the hotel, but still did not go to ED. Next day stay stayed in hotel and took sips of water and kept having bloody diarrhea, took her to the ED that afternoon and [facility] ER said she needed to be admitted and needed a tranfusion, but she decided to come home, flew home and at 2 am landed, needed WC , was so weak. Went to [facility], took 7 hrs to be seen and was admitted and got a blood transfusion.   Taking iron every other day with orange juice and the PPI twice daily. No longer bleeding now.   Has appt with GI , has EGD 1/2/24    Now, enegery is low and heart will pound with SOB if gets up and does to much.     Cardiology appt this afternoon, CT scan, echo and work up done for her preop.  Stopped the asipirin due to the bleeding ulcers.    Covid booster is due, last sick with covid 8/23    Chronic pain in the knee is 5/10 and at times 10/10  Tylenol is helping a little.    Stomach pain now and then, worse when empty, so eating little meals    HPI       BLOOD DRAW:     Annual Wellness Visit    Patient has been advised of split billing requirements and indicates understanding: Yes     Are you in the first 12 months of your Medicare Part B coverage?  No    Physical Health:  In general, how would you rate your overall physical health? fair  Outside of work, how many days during the week do you exercise?none  Outside of work, approximately how many minutes a day do you exercise?not applicable  If you drink alcohol do you typically have >3 drinks per day or >7 drinks per week? No  Do you usually eat at least 4 servings of fruit and vegetables a day, include whole grains & fiber and avoid regularly eating high fat or \"junk\" foods? No  Do you have any problems taking medications regularly? " No  Do you have any side effects from medications? none  Needs assistance for the following daily activities: no assistance needed  Which of the following safety concerns are present in your home?  lack of grab bars in the bathroom   Hearing impairment: Yes, Feel that people are mumbling or not speaking clearly.  Need to ask people to speak up or repeat themselves.  Difficulty understanding soft or whispered speech.  In the past 6 months, have you been bothered by leaking of urine? no    Mental Health:  In general, how would you rate your overall mental or emotional health? good        Do you feel safe in your environment? YES    Have you ever done Advance Care Planning? (For example, a Health Directive, POLST, or a discussion with a medical provider or your loved ones about your wishes)? Yes, advance care planning is on file.    Fall risk:  Fallen 2 or more times in the past year?: No  Any fall with injury in the past year?: No    Cognitive Screenin) Repeat 3 items (Leader, Season, Table)    2) Clock draw: NORMAL  3) 3 item recall: Recalls 3 objects  Results: 3 items recalled: COGNITIVE IMPAIRMENT LESS LIKELY    Mini-CogTM Copyright S Monica. Licensed by the author for use in [facility]; reprinted with permission (soob@[facility]). All rights reserved.      Do you have sleep apnea, excessive snoring or daytime drowsiness? : no    Social History     Tobacco Use    Smoking status: Former     Packs/day: 1.00     Years: 10.00     Additional pack years: 0.00     Total pack years: 10.00     Types: Cigarettes     Start date:      Quit date: 1980     Years since quittin.9    Smokeless tobacco: Never   Substance Use Topics    Alcohol use: Yes     Comment: 1-2 drinks a month             2021     9:21 AM   Alcohol Use   Prescreen: >3 drinks/day or >7 drinks/week? No          No data to display              Do you have a current opioid prescription? No  Do you use any other controlled substances  or medications that are not prescribed by a provider? None    Current providers sharing in care for this patient include:   Patient Care Team:  Ragini Stevens MD as PCP - General (Family Practice)  Karey Vasques MD as Assigned PCP  Halle Callaway APRN CNP as Nurse Practitioner (Cardiovascular Disease)  Teodoro Wilson MD as MD (Cardiovascular Disease)  Halle Callaway APRN CNP as Assigned Heart and Vascular Provider  Lupillo Celaya MD as MD (Otolaryngology)  Verna Steen AuD as Audiologist (Audiology)    The following health maintenance items are reviewed in Epic and correct as of today:  Health Maintenance   Topic Date Due    URINE DRUG SCREEN  Never done    COLORECTAL CANCER SCREENING  Never done    ZOSTER IMMUNIZATION (1 of 2) Never done    RSV VACCINE (Pregnancy & 60+) (1 - 1-dose 60+ series) Never done    DEXA  08/21/2015    MEDICARE ANNUAL WELLNESS VISIT  07/13/2022    DTAP/TDAP/TD IMMUNIZATION (2 - Td or Tdap) 04/08/2023    INFLUENZA VACCINE (1) 09/01/2023    COVID-19 Vaccine (5 - 2023-24 season) 09/01/2023    Pneumococcal Vaccine: 65+ Years (2 - PCV) 06/27/2023    ANNUAL REVIEW OF HM ORDERS  05/04/2024    MAMMO SCREENING  11/28/2024    FALL RISK ASSESSMENT  12/05/2024    LIPID  12/04/2028    ADVANCE CARE PLANNING  12/05/2028    HEPATITIS C SCREENING  Completed    PHQ-2 (once per calendar year)  Completed    IPV IMMUNIZATION  Aged Out    HPV IMMUNIZATION  Aged Out    MENINGITIS IMMUNIZATION  Aged Out    RSV MONOCLONAL ANTIBODY  Aged Out    LUNG CANCER SCREENING  Discontinued       Patient has been advised of split billing requirements and indicates understanding: Yes    Appropriate preventive services were discussed with this patient, including applicable screening as appropriate for fall prevention, nutrition, physical activity, Tobacco-use cessation, weight loss and cognition.  Checklist reviewing preventive services available has been given to the patient.        Review of Systems  "  Constitutional, HEENT, cardiovascular, pulmonary, GI, , musculoskeletal, neuro, skin, endocrine and psych systems are negative, except as otherwise noted.      Objective    /70 (BP Location: Right arm, Patient Position: Sitting, Cuff Size: Adult Large)   Pulse 76   Temp 97.7  F (36.5  C) (Oral)   Resp 12   Ht 1.727 m (5' 8\")   Wt 103.6 kg (228 lb 4.8 oz)   LMP 09/11/2005   SpO2 99%   BMI 34.71 kg/m    Body mass index is 34.71 kg/m .  Physical Exam   GENERAL: healthy, alert and no distress  EYES: Eyes grossly normal to inspection, PERRL and conjunctivae and sclerae normal  HENT: ear canals and TM's normal, nose and mouth without ulcers or lesions  NECK: no adenopathy, no asymmetry, masses, or scars and thyroid normal to palpation  RESP: lungs clear to auscultation - no rales, rhonchi or wheezes  CV: regular rate and rhythm, normal S1 S2, no S3 or S4, no murmur, click or rub, no peripheral edema and peripheral pulses strong  ABDOMEN: soft, nontender, no hepatosplenomegaly, no masses and bowel sounds normal  MS: no gross musculoskeletal defects noted, no edema  SKIN: no suspicious lesions or rashes  NEURO: Normal strength and tone, mentation intact and speech normal  PSYCH: mentation appears normal, affect normal/bright                      "

## 2023-12-08 ENCOUNTER — MYC MEDICAL ADVICE (OUTPATIENT)
Dept: FAMILY MEDICINE | Facility: CLINIC | Age: 68
End: 2023-12-08
Payer: MEDICARE

## 2023-12-08 DIAGNOSIS — K25.4 GASTROINTESTINAL HEMORRHAGE ASSOCIATED WITH GASTRIC ULCER: ICD-10-CM

## 2023-12-08 RX ORDER — FERROUS SULFATE 325(65) MG
325 TABLET ORAL EVERY OTHER DAY
Qty: 30 TABLET | Refills: 0 | OUTPATIENT
Start: 2023-12-08

## 2023-12-08 NOTE — TELEPHONE ENCOUNTER
Per chart review, patient to continue iron supplement  Needs refills    Sending to the refill pool to address.    Emma Jung RN

## 2023-12-11 ENCOUNTER — MYC MEDICAL ADVICE (OUTPATIENT)
Dept: FAMILY MEDICINE | Facility: CLINIC | Age: 68
End: 2023-12-11
Payer: MEDICARE

## 2023-12-11 DIAGNOSIS — Z96.653 STATUS POST TOTAL BILATERAL KNEE REPLACEMENT: Primary | ICD-10-CM

## 2023-12-12 RX ORDER — TRAMADOL HYDROCHLORIDE 50 MG/1
50 TABLET ORAL 2 TIMES DAILY
Qty: 60 TABLET | Refills: 0 | Status: SHIPPED | OUTPATIENT
Start: 2023-12-12 | End: 2023-12-21

## 2023-12-12 NOTE — TELEPHONE ENCOUNTER
See patient's MyChart message   - Patient requesting information about her Tramadol prescription; patient wondering why she has not heard back     Upon chart review:   - A prescription of traMADol (ULTRAM) 50 MG tablet was sent on 12/5/2023 and then cancelled/discontinued on 12/5/2023     traMADol (ULTRAM) 50 MG tablet (Discontinued) 60 tablet 0 12/5/2023 12/5/2023 No   Sig - Route: Take 1 tablet (50 mg) by mouth 2 times daily for 30 days - Oral     Dr. Stevens, please review and advise.     Sherrie TRACY RN   [facility]

## 2023-12-12 NOTE — TELEPHONE ENCOUNTER
Called pt and relayed provider message below.   Pt verbalized understanding of plan, and is agreeable.     Tiera Washington RN

## 2023-12-12 NOTE — TELEPHONE ENCOUNTER
Looks like tramadol prescription was sent 12/5/23, but then canceled by Trevor Au on 12/5/23 stating therapy was complete?

## 2023-12-15 NOTE — TELEPHONE ENCOUNTER
Prescription sent to [facility] on 12/12/23.  Called [facility] pharmacy, spoke to Pharmacist, Americo.    [facility] confirmed they have the prescription but it is out of stock.  They can order it and get it by Monday.  Or if pt prefers, prescription could be sent to [facility] in [facility] on [address] as they have it in stock.  [facility] will wait to hear if pt want them to order or will have it sent to [facility].    Attempted to call pt to see if she wants script transferred or have them order it for Monday.  TEDTCBDANUTA sent.    Called [facility] and informed them I was unable to reach pt.  Asked them to order Tramadol.  Asked them to contact pt.  Americo agreed.    Suzy Vieira RN, BSN  [facility]

## 2023-12-21 ENCOUNTER — TELEPHONE (OUTPATIENT)
Dept: FAMILY MEDICINE | Facility: CLINIC | Age: 68
End: 2023-12-21
Payer: MEDICARE

## 2023-12-21 DIAGNOSIS — Z96.653 STATUS POST TOTAL BILATERAL KNEE REPLACEMENT: ICD-10-CM

## 2023-12-21 RX ORDER — TRAMADOL HYDROCHLORIDE 50 MG/1
50 TABLET ORAL 2 TIMES DAILY
Qty: 60 TABLET | Refills: 0 | Status: SHIPPED | OUTPATIENT
Start: 2023-12-21 | End: 2024-03-14

## 2023-12-21 NOTE — TELEPHONE ENCOUNTER
[facility] does NOT have traMADol (ULTRAM) 50 MG tablet in stock, patient asking for this to be sent to [place] [address] [place] [facility] [address] [facility] [phone] ASAP if possible.

## 2023-12-22 ENCOUNTER — TELEPHONE (OUTPATIENT)
Dept: GASTROENTEROLOGY | Facility: CLINIC | Age: 68
End: 2023-12-22
Payer: MEDICARE

## 2023-12-22 NOTE — TELEPHONE ENCOUNTER
"Endoscopy Scheduling Screen    Have you had a positive Covid test in the last 14 days?  No      Are you active on MyChart?   Yes her  is; prefers mail    What insurance is in the chart?  Other:  medicare & medica    Ordering/Referring Provider: stephan shannon   (If ordering provider performs procedure, schedule with ordering provider unless otherwise instructed. )    BMI: Estimated body mass index is 34.67 kg/m  as calculated from the following:    Height as of 12/5/23: 1.727 m (5' 8\").    Weight as of 12/5/23: 103.4 kg (228 lb).     Sedation Ordered  moderate sedation.   If patient BMI > 50 do not schedule in [facility].    If patient BMI > 45 do not schedule at [facility].    Are you taking methadone or Suboxone?  No      Are you taking any prescription medications for pain 3 or more times per week?   NO - No RN review required. Tramadol, not started, prn      Do you have a history of malignant hyperthermia or adverse reaction to anesthesia?  No      (Females) Are you currently pregnant?          Have you been diagnosed or told you have pulmonary hypertension?   No      Do you have an LVAD?  No      Have you been told you have moderate to severe sleep apnea?  No      Have you been told you have COPD, asthma, or any other lung disease?  No      Do you have any heart conditions?  No       Have you ever had an organ transplant?   No      Have you ever had or are you awaiting a heart or lung transplant?   No      Have you had a stroke or transient ischemic attack (TIA aka \"mini stroke\" in the last 6 months?   No      Have you been diagnosed with or been told you have cirrhosis of the liver?   No      Are you currently on dialysis?   No      Do you need assistance transferring?   No    BMI: Estimated body mass index is 34.67 kg/m  as calculated from the following:    Height as of 12/5/23: 1.727 m (5' 8\").    Weight as of 12/5/23: 103.4 kg (228 lb).     Is patients BMI > 40 and scheduling location [facility]?  No      Do you take " an injectable medication for weight loss or diabetes (excluding insulin)?  No      Do you take the medication Naltrexone?  No      Do you take blood thinners?  No   Baby aspirin daily    Prep   Are you currently on dialysis or do you have chronic kidney disease?  No      Do you have a diagnosis of diabetes?  No      Do you have a diagnosis of cystic fibrosis (CF)?  No      On a regular basis do you go 3 -5 days between bowel movements?  No  sometimes      BMI > 40?  No    Preferred Pharmacy:    [id number] - [place], [place] - [address]  [id number] [facility] [address]  Phone: [phone] Fax: [phone]      Final Scheduling Details   Colonoscopy prep sent?  Standard MiraLAX    Procedure scheduled  Colonoscopy    Surgeon:  mara     Date of procedure:  2/7/24     Pre-OP / PAC:   No - Not required for this site.    Location  [facility] - Patient preference.    Sedation   Moderate Sedation - Per order.      Patient Reminders:   You will receive a call from a Nurse to review instructions and health history.  This assessment must be completed prior to your procedure.  Failure to complete the Nurse assessment may result in the procedure being cancelled.      On the day of your procedure, please designate an adult(s) who can drive you home stay with you for the next 24 hours. The medicines used in the exam will make you sleepy. You will not be able to drive.      You cannot take public transportation, ride share services, or non-medical taxi service without a responsible caregiver.  Medical transport services are allowed with the requirement that a responsible caregiver will receive you at your destination.  We require that drivers and caregivers are confirmed prior to your procedure.

## 2024-01-02 ENCOUNTER — TRANSFERRED RECORDS (OUTPATIENT)
Dept: HEALTH INFORMATION MANAGEMENT | Facility: CLINIC | Age: 69
End: 2024-01-02
Payer: MEDICARE

## 2024-01-10 ENCOUNTER — TRANSFERRED RECORDS (OUTPATIENT)
Dept: OTOLARYNGOLOGY | Facility: CLINIC | Age: 69
End: 2024-01-10

## 2024-01-10 ENCOUNTER — OFFICE VISIT (OUTPATIENT)
Dept: OTOLARYNGOLOGY | Facility: CLINIC | Age: 69
End: 2024-01-10
Attending: EMERGENCY MEDICINE
Payer: MEDICARE

## 2024-01-10 ENCOUNTER — OFFICE VISIT (OUTPATIENT)
Dept: AUDIOLOGY | Facility: CLINIC | Age: 69
End: 2024-01-10
Attending: EMERGENCY MEDICINE
Payer: MEDICARE

## 2024-01-10 DIAGNOSIS — H90.5 SENSORINEURAL HEARING LOSS (SNHL), UNSPECIFIED LATERALITY: Primary | ICD-10-CM

## 2024-01-10 DIAGNOSIS — H90.A22 SENSORINEURAL HEARING LOSS (SNHL) OF LEFT EAR WITH RESTRICTED HEARING OF RIGHT EAR: ICD-10-CM

## 2024-01-10 DIAGNOSIS — H69.92 DYSFUNCTION OF LEFT EUSTACHIAN TUBE: ICD-10-CM

## 2024-01-10 DIAGNOSIS — H91.92 HEARING LOSS OF LEFT EAR, UNSPECIFIED HEARING LOSS TYPE: ICD-10-CM

## 2024-01-10 DIAGNOSIS — H61.22 IMPACTED CERUMEN OF LEFT EAR: ICD-10-CM

## 2024-01-10 DIAGNOSIS — H61.23 BILATERAL IMPACTED CERUMEN: Primary | ICD-10-CM

## 2024-01-10 DIAGNOSIS — H93.13 TINNITUS OF BOTH EARS: Primary | ICD-10-CM

## 2024-01-10 PROCEDURE — 99203 OFFICE O/P NEW LOW 30 MIN: CPT | Mod: 25 | Performed by: OTOLARYNGOLOGY

## 2024-01-10 PROCEDURE — G0268 REMOVAL OF IMPACTED WAX MD: HCPCS | Performed by: OTOLARYNGOLOGY

## 2024-01-10 NOTE — PROGRESS NOTES
AUDIOLOGY REPORT    SUMMARY: Audiology visit completed. See audiogram for results. Abuse screening not completed due to same day appt with ENT clinic, where this is addressed.      RECOMMENDATIONS: Follow-up with ENT.      Maria D Morgan, [facility]  [place] Licensed Audiologist [id number]

## 2024-01-10 NOTE — LETTER
1/10/2024         RE: Rich Terry  [address] [facility] [address]        Dear Colleague,    Thank you for referring your patient, Rich Terry, to the [facility]. Please see a copy of my visit note below.    CHIEF COMPLAINT: Patient presents with:  Ear Problem: Left Ear, Ticking sound after Covid in early sept, Hearing loss that is making gradual improvement but still not were it was previously          HISTORY OF PRESENT ILLNESS    Rich was seen at the behest of Aries Gaffney MD for hearing loss LEFT ear after COVID.     AUDIOGRAM review    Dr. Celaya HX: decreased hearing in L ear; cerumen impaction of L ear noted at ED visit 9-18-23. Cerumen removed by WARREN Celaya today  prior to testing. Pt. noted no improvement in hearing once cerumen was removed. Endorsed bilat., constant tinnitus (not pulsatile or  debilitating), with clicking sound only in L ear. Denied dizziness, otalgia, otorrhea, recent illness. 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 Results: Clear canals, bilat. R:  Normal hearing 250-4000 Hz, sloping to mild, likely SNHL for 6489-4645 Hz. L: normal/borderline normal hearing sensitivity with mild  SNHL at 500 & 8000 Hz. Word rec ability: 100% correct, bilat. Tymps yielded normal static admittance, with negative middle ear pressure in  L ear. Present 1 KHz acoustic reflexes in the measure ipsi conditions, bilat.; absent in the measure contra conditions, bilat. Rec: f/u w/ENT;  retest hearing annually to monitor, or per med. mgmt./pt. concern. Wear hearing protection consistently in noise to preserve residual  hearing sensitivity and to minimize the effects of noise on tinnitus.     REVIEW OF SYSTEMS    Review of Systems as per HPI and PMHx, otherwise 10 system review system are negative.       ALLERGIES    Levaquin, Shrimp, and Sulfa  antibiotics    CURRENT MEDICATIONS      Current Outpatient Medications:      pantoprazole (PROTONIX) 40 MG EC tablet, Take 1 tablet (40 mg) by mouth 2 times daily (before meals), Disp: 60 tablet, Rfl: 2     rosuvastatin (CRESTOR) 40 MG tablet, Take 1 tablet (40 mg) by mouth daily, Disp: 90 tablet, Rfl: 3     traMADol (ULTRAM) 50 MG tablet, Take 1 tablet (50 mg) by mouth 2 times daily, Disp: 60 tablet, Rfl: 0     VITAMIN D PO, Take 1 tablet by mouth daily, Disp: , Rfl:      aspirin 81 MG EC tablet, Take 1 tablet (81 mg) by mouth daily (Patient not taking: Reported on 12/5/2023), Disp: , Rfl:      ferrous sulfate (FEROSUL) 325 (65 Fe) MG tablet, Take 1 tablet (325 mg) by mouth every other day (Patient not taking: Reported on 1/10/2024), Disp: 30 tablet, Rfl: 0     PAST MEDICAL HISTORY    PAST MEDICAL HISTORY:   Past Medical History:   Diagnosis Date     Gastrointestinal hemorrhage associated with gastric ulcer 11/2023    d/d NSAIDs     Generalized osteoarthrosis, unspecified site      Pain in right knee      Phlebitis and thrombophlebitis of superficial vessels of lower extremities     several episodes     Thrombosis of leg     superficial, not deep     Varicose veins of lower extremities with ulcer (H)        PAST SURGICAL HISTORY    PAST SURGICAL HISTORY:   Past Surgical History:   Procedure Laterality Date     ARTHROPLASTY KNEE Right 11/2/2015    Procedure: ARTHROPLASTY KNEE;  Surgeon: Prasad Valladares MD;  Location:  OR     ESOPHAGOSCOPY, GASTROSCOPY, DUODENOSCOPY (EGD), COMBINED N/A 11/1/2023    Procedure: Esophagoscopy, gastroscopy, duodenoscopy (EGD), combined biopsies of stomach with regular forceps;  Surgeon: Joey Paredes MD;  Location:  GI     EXCHANGE POLY COMPONENT ARTHROPLASTY KNEE Left 5/8/2023    Procedure: Polyethylene exchange of left total knee arthroplasty using Dayanna NexGen LPS knee;  Surgeon: Prasad Valladares MD;  Location:  OR     saphenous vein radiofrequency  ablation[  2012     SURGICAL HISTORY OF -       rt LE varicose vein procedure     SURGICAL HISTORY OF -   05    Tibial osteotomy (lt leg)     varicose vein avulsions/stab phlebectomy[  2012     [facility] NONSPECIFIC PROCEDURE      4 knee surgeries (all left knee)     [facility] NONSPECIFIC PROCEDURE      (L) planter fascia surgery       FAMILY  HISTORY    FAMILY HISTORY:   Family History   Problem Relation Age of Onset     Cancer Mother         cervical ca age 36     Heart Disease Mother         A-FIB     Skin Cancer Mother         on nose     Heart Disease Father          CHF age 51     Diabetes Maternal Grandfather      Cancer Maternal Grandmother         uterine ca dx in her 60's     Diabetes Paternal Grandfather      Hypertension Brother      Cancer Maternal Aunt         ovarian ca in her 40's     Diabetes Paternal Uncle         multiple Puncles with DM       SOCIAL HISTORY    SOCIAL HISTORY:   Social History     Tobacco Use     Smoking status: Former     Packs/day: 1.00     Years: 10.00     Additional pack years: 0.00     Total pack years: 10.00     Types: Cigarettes     Start date:      Quit date:      Years since quittin.0     Smokeless tobacco: Never   Substance Use Topics     Alcohol use: Yes     Comment: 1-2 drinks a month        PHYSICAL EXAM    HEAD: Normal appearance and symmetry:  No cutaneous lesions.      NECK:  supple     EARS:    Right:   TM intact nl; ++ cerumen impaction   LEFT:   TM intact nl;  ++ cerumen impaction     CERUMEN IMPACTION REMOVAL    After obtaining verbal consent, and using the binocular microscope.  Cerumen impaction(s) were removed from the affected ear canal(s)  using a wire loops and/or suction.  The patient tolerated the procedure well without incident.      EYES:  EOMI    CN VII/XII:  intact     NOSE:     Dorsum:   straight  Septum:  midline  Mucosa:  moist        ORAL CAVITY/OROPHARYNX:     Lips:  Normal.  Tongue: normal, midline  Mucosa:   no lesions      NECK:  Trachea:  midline.              Thyroid:  normal              Adenopathy:  none        NEURO:   Alert and Oriented     GAIT AND STATION:  normal     RESPIRATORY:   Symmetry and Respiratory effort     PSYCH:  Normal mood and affect     SKIN:   warm and dry     AUDIOGRAM: mild low frequency LEFT SNHL      IMPRESSION:    Encounter Diagnoses   Name Primary?     Hearing loss of left ear, unspecified hearing loss type      Impacted cerumen of left ear      Bilateral impacted cerumen Yes          RECOMMENDATIONS:      Orders Placed This Encounter   Procedures     Remove Cerumen       Return visit 6 months with repeat audiogram.       Again, thank you for allowing me to participate in the care of your patient.        Sincerely,        Lupillo Celaya MD

## 2024-01-10 NOTE — PROGRESS NOTES
CHIEF COMPLAINT: Patient presents with:  Ear Problem: Left Ear, Ticking sound after Covid in early sept, Hearing loss that is making gradual improvement but still not were it was previously          HISTORY OF PRESENT ILLNESS    Rich was seen at the behest of Aries Gaffney MD for hearing loss LEFT ear after COVID.     AUDIOGRAM review    Dr. Celaya HX: decreased hearing in L ear; cerumen impaction of L ear noted at ED visit 9-18-23. Cerumen removed by WARREN Celaya today  prior to testing. Pt. noted no improvement in hearing once cerumen was removed. Endorsed bilat., constant tinnitus (not pulsatile or  debilitating), with clicking sound only in L ear. Denied dizziness, otalgia, otorrhea, recent illness. 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 Results: Clear canals, bilat. R:  Normal hearing 250-4000 Hz, sloping to mild, likely SNHL for 3732-5749 Hz. L: normal/borderline normal hearing sensitivity with mild  SNHL at 500 & 8000 Hz. Word rec ability: 100% correct, bilat. Tymps yielded normal static admittance, with negative middle ear pressure in  L ear. Present 1 KHz acoustic reflexes in the measure ipsi conditions, bilat.; absent in the measure contra conditions, bilat. Rec: f/u w/ENT;  retest hearing annually to monitor, or per med. mgmt./pt. concern. Wear hearing protection consistently in noise to preserve residual  hearing sensitivity and to minimize the effects of noise on tinnitus.     REVIEW OF SYSTEMS    Review of Systems as per HPI and PMHx, otherwise 10 system review system are negative.       ALLERGIES    Levaquin, Shrimp, and Sulfa antibiotics    CURRENT MEDICATIONS      Current Outpatient Medications:     pantoprazole (PROTONIX) 40 MG EC tablet, Take 1 tablet (40 mg) by mouth 2 times daily (before meals), Disp: 60 tablet, Rfl: 2    rosuvastatin (CRESTOR) 40 MG tablet, Take 1 tablet (40 mg) by mouth  daily, Disp: 90 tablet, Rfl: 3    traMADol (ULTRAM) 50 MG tablet, Take 1 tablet (50 mg) by mouth 2 times daily, Disp: 60 tablet, Rfl: 0    VITAMIN D PO, Take 1 tablet by mouth daily, Disp: , Rfl:     aspirin 81 MG EC tablet, Take 1 tablet (81 mg) by mouth daily (Patient not taking: Reported on 12/5/2023), Disp: , Rfl:     ferrous sulfate (FEROSUL) 325 (65 Fe) MG tablet, Take 1 tablet (325 mg) by mouth every other day (Patient not taking: Reported on 1/10/2024), Disp: 30 tablet, Rfl: 0     PAST MEDICAL HISTORY    PAST MEDICAL HISTORY:   Past Medical History:   Diagnosis Date    Gastrointestinal hemorrhage associated with gastric ulcer 11/2023    d/d NSAIDs    Generalized osteoarthrosis, unspecified site     Pain in right knee     Phlebitis and thrombophlebitis of superficial vessels of lower extremities     several episodes    Thrombosis of leg     superficial, not deep    Varicose veins of lower extremities with ulcer (H)        PAST SURGICAL HISTORY    PAST SURGICAL HISTORY:   Past Surgical History:   Procedure Laterality Date    ARTHROPLASTY KNEE Right 11/2/2015    Procedure: ARTHROPLASTY KNEE;  Surgeon: Prasad Valladares MD;  Location: [facility] OR    ESOPHAGOSCOPY, GASTROSCOPY, DUODENOSCOPY (EGD), COMBINED N/A 11/1/2023    Procedure: Esophagoscopy, gastroscopy, duodenoscopy (EGD), combined biopsies of stomach with regular forceps;  Surgeon: Joey Paredes MD;  Location:  GI    EXCHANGE POLY COMPONENT ARTHROPLASTY KNEE Left 5/8/2023    Procedure: Polyethylene exchange of left total knee arthroplasty using Dayanna NexGen LPS knee;  Surgeon: Prasad Valladares MD;  Location: [facility] OR    saphenous vein radiofrequency ablation[  7/6/2012    SURGICAL HISTORY OF -   4/04    rt LE varicose vein procedure    SURGICAL HISTORY OF -   1-19-05    Tibial osteotomy (lt leg)    varicose vein avulsions/stab phlebectomy[  7/6/2012    [facility] NONSPECIFIC PROCEDURE      4 knee surgeries (all left knee)    [facility] NONSPECIFIC  PROCEDURE      (L) planter fascia surgery       FAMILY  HISTORY    FAMILY HISTORY:   Family History   Problem Relation Age of Onset    Cancer Mother         cervical ca age 36    Heart Disease Mother         A-FIB    Skin Cancer Mother         on nose    Heart Disease Father          CHF age 51    Diabetes Maternal Grandfather     Cancer Maternal Grandmother         uterine ca dx in her 60's    Diabetes Paternal Grandfather     Hypertension Brother     Cancer Maternal Aunt         ovarian ca in her 40's    Diabetes Paternal Uncle         multiple Puncles with DM       SOCIAL HISTORY    SOCIAL HISTORY:   Social History     Tobacco Use    Smoking status: Former     Packs/day: 1.00     Years: 10.00     Additional pack years: 0.00     Total pack years: 10.00     Types: Cigarettes     Start date:      Quit date:      Years since quittin.0    Smokeless tobacco: Never   Substance Use Topics    Alcohol use: Yes     Comment: 1-2 drinks a month        PHYSICAL EXAM    HEAD: Normal appearance and symmetry:  No cutaneous lesions.      NECK:  supple     EARS:    Right:   TM intact nl; ++ cerumen impaction   LEFT:   TM intact nl;  ++ cerumen impaction     CERUMEN IMPACTION REMOVAL    After obtaining verbal consent, and using the binocular microscope.  Cerumen impaction(s) were removed from the affected ear canal(s)  using a wire loops and/or suction.  The patient tolerated the procedure well without incident.      EYES:  EOMI    CN VII/XII:  intact     NOSE:     Dorsum:   straight  Septum:  midline  Mucosa:  moist        ORAL CAVITY/OROPHARYNX:     Lips:  Normal.  Tongue: normal, midline  Mucosa:   no lesions     NECK:  Trachea:  midline.              Thyroid:  normal              Adenopathy:  none        NEURO:   Alert and Oriented     GAIT AND STATION:  normal     RESPIRATORY:   Symmetry and Respiratory effort     PSYCH:  Normal mood and affect     SKIN:   warm and dry     AUDIOGRAM: mild low frequency LEFT  SNHL      IMPRESSION:    Encounter Diagnoses   Name Primary?    Hearing loss of left ear, unspecified hearing loss type     Impacted cerumen of left ear     Bilateral impacted cerumen Yes          RECOMMENDATIONS:      Orders Placed This Encounter   Procedures    Remove Cerumen       Return visit 6 months with repeat audiogram.

## 2024-01-11 ENCOUNTER — TELEPHONE (OUTPATIENT)
Dept: OTOLARYNGOLOGY | Facility: CLINIC | Age: 69
End: 2024-01-11
Payer: MEDICARE

## 2024-01-11 NOTE — TELEPHONE ENCOUNTER
Attempted to call pt and left a VM to call back.    [facility]      Megan Hansen  RN, BSN  [facility]  ENT  [address]  [place] [address]  Office:[phone]  Employed by [employer]

## 2024-01-12 NOTE — TELEPHONE ENCOUNTER
Attempted to call pt and left a VM to call back.    [facility]      Megan Hansen  RN, BSN  [facility]  ENT  [address]  [place] [address]  Office:[phone]  Employed by [facility]

## 2024-01-12 NOTE — TELEPHONE ENCOUNTER
Spoke with Rich about her questions to her audio.  Pt was asking about negative pressure and a ticking in the left ear.  Ticking does not go with the heart beat.  Provided pt with a little information on the ear questions.  Pt expressed understanding of upcoming appointment.    [facility]      Megan Hansen  RN, BSN  [facility]  ENT  [address]  [place] [address]  Office:[phone]  Employed by [facility]

## 2024-01-12 NOTE — TELEPHONE ENCOUNTER
[facility] Call Center    Phone Message    May a detailed message be left on voicemail: yes     Reason for Call: Other: Pt called back to speak with Megan. Please call to discuss. Thank you     Action Taken: Message routed to:  [facility] ([facility] ENT    Travel Screening: Not Applicable

## 2024-02-06 ENCOUNTER — MEDICAL CORRESPONDENCE (OUTPATIENT)
Dept: SCHEDULING | Facility: CLINIC | Age: 69
End: 2024-02-06
Payer: MEDICARE

## 2024-02-06 ENCOUNTER — TRANSFERRED RECORDS (OUTPATIENT)
Dept: HEALTH INFORMATION MANAGEMENT | Facility: CLINIC | Age: 69
End: 2024-02-06
Payer: MEDICARE

## 2024-02-07 ENCOUNTER — HOSPITAL ENCOUNTER (OUTPATIENT)
Facility: CLINIC | Age: 69
Discharge: HOME | End: 2024-02-07
Attending: COLON & RECTAL SURGERY | Admitting: COLON & RECTAL SURGERY
Payer: MEDICARE

## 2024-02-07 VITALS
HEIGHT: 68 IN | HEART RATE: 74 BPM | OXYGEN SATURATION: 100 % | WEIGHT: 228 LBS | RESPIRATION RATE: 16 BRPM | BODY MASS INDEX: 34.56 KG/M2 | DIASTOLIC BLOOD PRESSURE: 71 MMHG | TEMPERATURE: 98.1 F | SYSTOLIC BLOOD PRESSURE: 127 MMHG

## 2024-02-07 LAB — COLONOSCOPY: NORMAL

## 2024-02-07 PROCEDURE — G0500 MOD SEDAT ENDO SERVICE >5YRS: HCPCS | Mod: PT | Performed by: COLON & RECTAL SURGERY

## 2024-02-07 PROCEDURE — 88305 TISSUE EXAM BY PATHOLOGIST: CPT | Mod: TC | Performed by: COLON & RECTAL SURGERY

## 2024-02-07 PROCEDURE — 250N000011 HC RX IP 250 OP 636: Performed by: COLON & RECTAL SURGERY

## 2024-02-07 PROCEDURE — 45380 COLONOSCOPY AND BIOPSY: CPT | Performed by: COLON & RECTAL SURGERY

## 2024-02-07 PROCEDURE — 88305 TISSUE EXAM BY PATHOLOGIST: CPT | Mod: 26 | Performed by: PATHOLOGY

## 2024-02-07 RX ORDER — ATROPINE SULFATE 0.1 MG/ML
1 INJECTION INTRAVENOUS
Status: DISCONTINUED | OUTPATIENT
Start: 2024-02-07 | End: 2024-02-07 | Stop reason: HOSPADM

## 2024-02-07 RX ORDER — ONDANSETRON 2 MG/ML
4 INJECTION INTRAMUSCULAR; INTRAVENOUS
Status: DISCONTINUED | OUTPATIENT
Start: 2024-02-07 | End: 2024-02-07 | Stop reason: HOSPADM

## 2024-02-07 RX ORDER — NALOXONE HYDROCHLORIDE 0.4 MG/ML
0.2 INJECTION, SOLUTION INTRAMUSCULAR; INTRAVENOUS; SUBCUTANEOUS
Status: DISCONTINUED | OUTPATIENT
Start: 2024-02-07 | End: 2024-02-07 | Stop reason: HOSPADM

## 2024-02-07 RX ORDER — FLUMAZENIL 0.1 MG/ML
0.2 INJECTION, SOLUTION INTRAVENOUS
Status: DISCONTINUED | OUTPATIENT
Start: 2024-02-07 | End: 2024-02-07 | Stop reason: HOSPADM

## 2024-02-07 RX ORDER — ONDANSETRON 4 MG/1
4 TABLET, ORALLY DISINTEGRATING ORAL EVERY 6 HOURS PRN
Status: CANCELLED | OUTPATIENT
Start: 2024-02-07

## 2024-02-07 RX ORDER — ONDANSETRON 2 MG/ML
4 INJECTION INTRAMUSCULAR; INTRAVENOUS EVERY 6 HOURS PRN
Status: CANCELLED | OUTPATIENT
Start: 2024-02-07

## 2024-02-07 RX ORDER — EPINEPHRINE 1 MG/ML
0.1 INJECTION, SOLUTION INTRAMUSCULAR; SUBCUTANEOUS
Status: DISCONTINUED | OUTPATIENT
Start: 2024-02-07 | End: 2024-02-07 | Stop reason: HOSPADM

## 2024-02-07 RX ORDER — FENTANYL CITRATE 50 UG/ML
50-100 INJECTION, SOLUTION INTRAMUSCULAR; INTRAVENOUS EVERY 5 MIN PRN
Status: DISCONTINUED | OUTPATIENT
Start: 2024-02-07 | End: 2024-02-07 | Stop reason: HOSPADM

## 2024-02-07 RX ORDER — PROCHLORPERAZINE MALEATE 5 MG
5 TABLET ORAL EVERY 6 HOURS PRN
Status: CANCELLED | OUTPATIENT
Start: 2024-02-07

## 2024-02-07 RX ORDER — NALOXONE HYDROCHLORIDE 0.4 MG/ML
0.4 INJECTION, SOLUTION INTRAMUSCULAR; INTRAVENOUS; SUBCUTANEOUS
Status: DISCONTINUED | OUTPATIENT
Start: 2024-02-07 | End: 2024-02-07 | Stop reason: HOSPADM

## 2024-02-07 RX ORDER — LIDOCAINE 40 MG/G
CREAM TOPICAL
Status: DISCONTINUED | OUTPATIENT
Start: 2024-02-07 | End: 2024-02-07 | Stop reason: HOSPADM

## 2024-02-07 RX ORDER — DIPHENHYDRAMINE HYDROCHLORIDE 50 MG/ML
25-50 INJECTION INTRAMUSCULAR; INTRAVENOUS
Status: DISCONTINUED | OUTPATIENT
Start: 2024-02-07 | End: 2024-02-07 | Stop reason: HOSPADM

## 2024-02-07 RX ORDER — SIMETHICONE 40MG/0.6ML
133 SUSPENSION, DROPS(FINAL DOSAGE FORM)(ML) ORAL
Status: DISCONTINUED | OUTPATIENT
Start: 2024-02-07 | End: 2024-02-07 | Stop reason: HOSPADM

## 2024-02-07 RX ORDER — FLUMAZENIL 0.1 MG/ML
0.2 INJECTION, SOLUTION INTRAVENOUS
Status: CANCELLED | OUTPATIENT
Start: 2024-02-07 | End: 2024-02-07

## 2024-02-07 RX ADMIN — MIDAZOLAM 2 MG: 1 INJECTION INTRAMUSCULAR; INTRAVENOUS at 11:08

## 2024-02-07 RX ADMIN — FENTANYL CITRATE 100 MCG: 0.05 INJECTION, SOLUTION INTRAMUSCULAR; INTRAVENOUS at 11:08

## 2024-02-07 ASSESSMENT — ACTIVITIES OF DAILY LIVING (ADL): ADLS_ACUITY_SCORE: 37

## 2024-02-07 NOTE — H&P
Pre-Endoscopy History and Physical     Rich Terry MRN# [id number]   YOB: 1955 Age: 69 year old     Date of Procedure: 2/7/2024  Primary care provider: Ragnii Stevens  Type of Endoscopy: colonoscopy  Reason for Procedure: screening, first colonoscopy  Type of Anesthesia Anticipated: Moderate Sedation    HPI:    Rich is a 69 year old female who will be undergoing the above procedure.      A history and physical has been performed. The patient's medications and allergies have been reviewed. The risks and benefits of the procedure and the sedation options and risks were discussed with the patient.  All questions were answered and informed consent was obtained.      She denies a personal or family history of anesthesia complications or bleeding disorders.     Allergies   Allergen Reactions    Levaquin Muscle Pain (Myalgia)    Shrimp      throat swells    Sulfa Antibiotics Rash     rash        Prior to Admission Medications   Prescriptions Last Dose Informant Patient Reported? Taking?   VITAMIN D PO 2/6/2024 Self Yes Yes   Sig: Take 1 tablet by mouth daily   pantoprazole (PROTONIX) 40 MG EC tablet 2/6/2024  No Yes   Sig: Take 1 tablet (40 mg) by mouth 2 times daily (before meals)   rosuvastatin (CRESTOR) 40 MG tablet 2/6/2024 Self No Yes   Sig: Take 1 tablet (40 mg) by mouth daily   traMADol (ULTRAM) 50 MG tablet Past Week  No Yes   Sig: Take 1 tablet (50 mg) by mouth 2 times daily      Facility-Administered Medications: None       Patient Active Problem List   Diagnosis    Generalized osteoarthrosis, unspecified site    CARDIOVASCULAR SCREENING; LDL GOAL LESS THAN 160    Elevated BP    Vitamin D deficiency    Adjustment disorder with mixed anxiety and depressed mood    Advanced directives, counseling/discussion    Exposure to toxic chemical    DDD (degenerative disc disease), lumbar    Elevated glucose    Dysmetabolic syndrome X    Venous stasis dermatitis of left lower extremity     Post-menopausal    Morbid obesity (H)    S/P total knee replacement    Acute pain of left knee    Aftercare following left knee joint replacement surgery    AK (actinic keratosis)    S/P revision of total knee    S/P revision of total knee, left    Upper GI bleed    Anemia due to blood loss, acute        Past Medical History:   Diagnosis Date    Gastrointestinal hemorrhage associated with gastric ulcer 11/2023    d/d NSAIDs    Generalized osteoarthrosis, unspecified site     Hypercholesteremia     Pain in right knee     Phlebitis and thrombophlebitis of superficial vessels of lower extremities     several episodes    Thrombosis of leg     superficial, not deep    Varicose veins of lower extremities with ulcer (H)         Past Surgical History:   Procedure Laterality Date    ARTHROPLASTY KNEE Right 11/2/2015    Procedure: ARTHROPLASTY KNEE;  Surgeon: Prasad Valladares MD;  Location: [facility] OR    ESOPHAGOSCOPY, GASTROSCOPY, DUODENOSCOPY (EGD), COMBINED N/A 11/1/2023    Procedure: Esophagoscopy, gastroscopy, duodenoscopy (EGD), combined biopsies of stomach with regular forceps;  Surgeon: Joey Paredes MD;  Location: [facility] GI    EXCHANGE POLY COMPONENT ARTHROPLASTY KNEE Left 5/8/2023    Procedure: Polyethylene exchange of left total knee arthroplasty using Dayanna NexGen LPS knee;  Surgeon: Prasad Valladares MD;  Location: [facility] OR    saphenous vein radiofrequency ablation[  7/6/2012    SURGICAL HISTORY OF -   4/04    rt LE varicose vein procedure    SURGICAL HISTORY OF -   1-19-05    Tibial osteotomy (lt leg)    varicose vein avulsions/stab phlebectomy[  7/6/2012    [facility] NONSPECIFIC PROCEDURE      4 knee surgeries (all left knee)    [facility] NONSPECIFIC PROCEDURE      (L) planter fascia surgery       Social History     Tobacco Use    Smoking status: Former     Packs/day: 1.00     Years: 10.00     Additional pack years: 0.00     Total pack years: 10.00     Types: Cigarettes     Start date: 1970     Quit date:  "1980     Years since quittin.1    Smokeless tobacco: Never   Substance Use Topics    Alcohol use: Yes     Comment: 1-2 drinks a month       Family History   Problem Relation Age of Onset    Cancer Mother         cervical ca age 36    Heart Disease Mother         A-FIB    Skin Cancer Mother         on nose    Heart Disease Father          CHF age 51    Diabetes Maternal Grandfather     Cancer Maternal Grandmother         uterine ca dx in her 60's    Diabetes Paternal Grandfather     Hypertension Brother     Cancer Maternal Aunt         ovarian ca in her 40's    Diabetes Paternal Uncle         multiple Puncles with DM       REVIEW OF SYSTEMS:     5 point ROS negative except as noted above in HPI, including Gen., Resp., CV, GI &  system review.      PHYSICAL EXAM:   /67 (BP Location: Left arm)   Pulse 80   Temp 98.1  F (36.7  C) (Temporal)   Resp 12   Ht 1.727 m (5' 8\")   Wt 103.4 kg (228 lb)   LMP 2005   SpO2 97%   BMI 34.67 kg/m   Estimated body mass index is 34.67 kg/m  as calculated from the following:    Height as of this encounter: 1.727 m (5' 8\").    Weight as of this encounter: 103.4 kg (228 lb).   GENERAL APPEARANCE: healthy and alert  MENTAL STATUS: alert  AIRWAY EXAM: Mallampatti Class II (visualization of the soft palate, fauces, and uvula)  RESP: lungs clear to auscultation - no rales, rhonchi or wheezes  CV: normal S1 S2, no S3 or S4      DIAGNOSTICS:    Not indicated      IMPRESSION   ASA Class 2 - Mild systemic disease        PLAN:       Plan for colonoscopy. We discussed the risks, benefits and alternatives and the patient wished to proceed.    The above has been forwarded to the consulting provider.      Signed Electronically by: Gill Hauser MD  2024    "

## 2024-02-08 LAB
PATH REPORT.COMMENTS IMP SPEC: NORMAL
PATH REPORT.COMMENTS IMP SPEC: NORMAL
PATH REPORT.FINAL DX SPEC: NORMAL
PATH REPORT.GROSS SPEC: NORMAL
PATH REPORT.MICROSCOPIC SPEC OTHER STN: NORMAL
PATH REPORT.RELEVANT HX SPEC: NORMAL
PHOTO IMAGE: NORMAL

## 2024-02-14 ENCOUNTER — HOSPITAL ENCOUNTER (OUTPATIENT)
Dept: NUCLEAR MEDICINE | Facility: CLINIC | Age: 69
Setting detail: NUCLEAR MEDICINE
Discharge: HOME | End: 2024-02-14
Attending: ORTHOPAEDIC SURGERY
Payer: MEDICARE

## 2024-02-14 DIAGNOSIS — M25.569 KNEE PAIN: ICD-10-CM

## 2024-02-14 DIAGNOSIS — Z47.89 AFTERCARE FOLLOWING SURGERY OF THE MUSCULOSKELETAL SYSTEM, NEC: ICD-10-CM

## 2024-02-14 PROCEDURE — 78315 BONE IMAGING 3 PHASE: CPT

## 2024-02-14 PROCEDURE — A9561 TC99M OXIDRONATE: HCPCS | Performed by: ORTHOPAEDIC SURGERY

## 2024-02-14 PROCEDURE — 343N000001 HC RX 343: Performed by: ORTHOPAEDIC SURGERY

## 2024-02-14 RX ADMIN — Medication 26 MILLICURIE: at 10:03

## 2024-02-20 ENCOUNTER — LAB (OUTPATIENT)
Dept: LAB | Facility: CLINIC | Age: 69
End: 2024-02-20
Payer: MEDICARE

## 2024-02-20 DIAGNOSIS — M25.569 KNEE PAIN: ICD-10-CM

## 2024-02-20 LAB
BASOPHILS # BLD AUTO: 0.1 10E3/UL (ref 0–0.2)
BASOPHILS NFR BLD AUTO: 1 %
CRP SERPL-MCNC: <3 MG/L
EOSINOPHIL # BLD AUTO: 0.2 10E3/UL (ref 0–0.7)
EOSINOPHIL NFR BLD AUTO: 3 %
ERYTHROCYTE [DISTWIDTH] IN BLOOD BY AUTOMATED COUNT: 14 % (ref 10–15)
ERYTHROCYTE [SEDIMENTATION RATE] IN BLOOD BY WESTERGREN METHOD: 22 MM/HR (ref 0–30)
HCT VFR BLD AUTO: 39.2 % (ref 35–47)
HGB BLD-MCNC: 12.5 G/DL (ref 11.7–15.7)
IMM GRANULOCYTES # BLD: 0 10E3/UL
IMM GRANULOCYTES NFR BLD: 0 %
LYMPHOCYTES # BLD AUTO: 2.7 10E3/UL (ref 0.8–5.3)
LYMPHOCYTES NFR BLD AUTO: 38 %
MCH RBC QN AUTO: 28.9 PG (ref 26.5–33)
MCHC RBC AUTO-ENTMCNC: 31.9 G/DL (ref 31.5–36.5)
MCV RBC AUTO: 91 FL (ref 78–100)
MONOCYTES # BLD AUTO: 0.5 10E3/UL (ref 0–1.3)
MONOCYTES NFR BLD AUTO: 7 %
NEUTROPHILS # BLD AUTO: 3.6 10E3/UL (ref 1.6–8.3)
NEUTROPHILS NFR BLD AUTO: 51 %
NRBC # BLD AUTO: 0 10E3/UL
NRBC BLD AUTO-RTO: 0 /100
PLATELET # BLD AUTO: 301 10E3/UL (ref 150–450)
RBC # BLD AUTO: 4.33 10E6/UL (ref 3.8–5.2)
WBC # BLD AUTO: 7.1 10E3/UL (ref 4–11)

## 2024-02-20 PROCEDURE — 86140 C-REACTIVE PROTEIN: CPT

## 2024-02-20 PROCEDURE — 85652 RBC SED RATE AUTOMATED: CPT

## 2024-02-20 PROCEDURE — 85004 AUTOMATED DIFF WBC COUNT: CPT

## 2024-02-20 PROCEDURE — 36415 COLL VENOUS BLD VENIPUNCTURE: CPT

## 2024-03-14 ENCOUNTER — OFFICE VISIT (OUTPATIENT)
Dept: FAMILY MEDICINE | Facility: CLINIC | Age: 69
End: 2024-03-14
Payer: MEDICARE

## 2024-03-14 VITALS
RESPIRATION RATE: 16 BRPM | DIASTOLIC BLOOD PRESSURE: 79 MMHG | SYSTOLIC BLOOD PRESSURE: 127 MMHG | HEART RATE: 85 BPM | TEMPERATURE: 98.1 F | OXYGEN SATURATION: 98 % | HEIGHT: 68 IN | BODY MASS INDEX: 35.75 KG/M2 | WEIGHT: 235.9 LBS

## 2024-03-14 DIAGNOSIS — Z78.0 ASYMPTOMATIC POSTMENOPAUSAL ESTROGEN DEFICIENCY: ICD-10-CM

## 2024-03-14 DIAGNOSIS — Z96.653 STATUS POST TOTAL BILATERAL KNEE REPLACEMENT: ICD-10-CM

## 2024-03-14 DIAGNOSIS — E55.9 VITAMIN D DEFICIENCY: ICD-10-CM

## 2024-03-14 DIAGNOSIS — Z96.652 S/P REVISION OF TOTAL KNEE, LEFT: ICD-10-CM

## 2024-03-14 DIAGNOSIS — Z87.11 HISTORY OF GASTRIC ULCER: Primary | ICD-10-CM

## 2024-03-14 LAB
ERYTHROCYTE [DISTWIDTH] IN BLOOD BY AUTOMATED COUNT: 15.4 % (ref 10–15)
HCT VFR BLD AUTO: 40 % (ref 35–47)
HGB BLD-MCNC: 12.8 G/DL (ref 11.7–15.7)
HOLD SPECIMEN: NORMAL
MCH RBC QN AUTO: 28.4 PG (ref 26.5–33)
MCHC RBC AUTO-ENTMCNC: 32 G/DL (ref 31.5–36.5)
MCV RBC AUTO: 89 FL (ref 78–100)
PLATELET # BLD AUTO: 320 10E3/UL (ref 150–450)
RBC # BLD AUTO: 4.5 10E6/UL (ref 3.8–5.2)
WBC # BLD AUTO: 9.7 10E3/UL (ref 4–11)

## 2024-03-14 PROCEDURE — 36415 COLL VENOUS BLD VENIPUNCTURE: CPT | Performed by: NURSE PRACTITIONER

## 2024-03-14 PROCEDURE — 83550 IRON BINDING TEST: CPT | Performed by: NURSE PRACTITIONER

## 2024-03-14 PROCEDURE — 83540 ASSAY OF IRON: CPT | Performed by: NURSE PRACTITIONER

## 2024-03-14 PROCEDURE — 99214 OFFICE O/P EST MOD 30 MIN: CPT | Performed by: NURSE PRACTITIONER

## 2024-03-14 PROCEDURE — 85027 COMPLETE CBC AUTOMATED: CPT | Performed by: NURSE PRACTITIONER

## 2024-03-14 PROCEDURE — 82728 ASSAY OF FERRITIN: CPT | Performed by: NURSE PRACTITIONER

## 2024-03-14 RX ORDER — PANTOPRAZOLE SODIUM 20 MG/1
40 TABLET, DELAYED RELEASE ORAL DAILY
Qty: 90 TABLET | Refills: 1 | Status: SHIPPED | OUTPATIENT
Start: 2024-03-14 | End: 2024-04-22

## 2024-03-14 RX ORDER — TRAMADOL HYDROCHLORIDE 50 MG/1
50 TABLET ORAL 2 TIMES DAILY
Qty: 60 TABLET | Refills: 0 | Status: SHIPPED | OUTPATIENT
Start: 2024-03-14

## 2024-03-14 RX ORDER — RESPIRATORY SYNCYTIAL VIRUS VACCINE 120MCG/0.5
0.5 KIT INTRAMUSCULAR ONCE
Qty: 1 EACH | Refills: 0 | Status: CANCELLED | OUTPATIENT
Start: 2024-03-14 | End: 2024-03-14

## 2024-03-14 ASSESSMENT — PAIN SCALES - GENERAL: PAINLEVEL: NO PAIN (0)

## 2024-03-14 NOTE — PROGRESS NOTES
Assessment & Plan     ICD-10-CM    1. History of gastric ulcer  Z87.11 pantoprazole (PROTONIX) 20 MG EC tablet     Hemoglobin     CBC with Platelets and Reflex to Iron Studies     Iron & Iron Binding Capacity     Ferritin   Follow-up as indicated.  Consider risks and benefits of ongoing PPI, minimize dosing if possible      2. S/P revision of total knee, left  Z96.652 traMADol (ULTRAM) 50 MG tablet   Refill tramadol and monitor.  Continue OTC options.  Pt has not responded fully to lidocaine patch and is hesitant to use voltaren      3. Vitamin D deficiency  E55.9       4. Status post total bilateral knee replacement  Z96.653       5. Asymptomatic postmenopausal estrogen deficiency  Z78.0 DEXA HIP/PELVIS/SPINE - Future          DUTCH Oliva CNP  [facility] [place]  ============================================      Subjective   Rich is a 69 year old, presenting for the following health issues:  Follow Up (Follow up on bleeding ulcer )        3/14/2024     4:22 PM   Additional Questions   Roomed by Kylah ELAM MA   Accompanied by self         3/14/2024     4:22 PM   Patient Reported Additional Medications   Patient reports taking the following new medications none     History of gastric ulcer  10/2023-- admission to hospital with acute bleed  Related to use of NSAIDS  1/2024 EGD shows ulcers have healed.   Also noted schatzki's ring and hiatal hernia.  Patient has been off pantoprozole and has significant heartburn presently.  Has abstained from NSAIDS.    Plan:  Will check blood count and ferritin.  May need ongoing management for GERD even though gastric ulcers have healed.    S/P revision of total knee, left  Ongoing knee pain and lymphedema which is longstanding left greater than right.  Discussed options for management which are limited given intolerance to NSAIDS.  Refilled tramadol, pt aware of possible addictive nature/dependence with this medication.  Is using sparingly.  PDMP reviewed.   "Continue with ice, heat, consider referral to lymphedema if pt wishes.    History of Present Illness       Reason for visit:  Recheck    She eats 2-3 servings of fruits and vegetables daily.She consumes 2 sweetened beverage(s) daily.She exercises with enough effort to increase her heart rate 10 to 19 minutes per day.  She exercises with enough effort to increase her heart rate 3 or less days per week.   She is taking medications regularly.     Follow up on bleeding ulcer       Review of Systems   Constitutional: Negative.    Respiratory: Negative.     Cardiovascular: Negative.    Gastrointestinal:  Positive for heartburn. Negative for abdominal pain, diarrhea, hematochezia, nausea and vomiting.   Genitourinary: Negative.    Psychiatric/Behavioral: Negative.             Objective    /79 (BP Location: Right arm, Patient Position: Sitting, Cuff Size: Adult Large)   Pulse 85   Temp 98.1  F (36.7  C) (Oral)   Resp 16   Ht 1.727 m (5' 8\")   Wt 107 kg (235 lb 14.4 oz)   LMP 09/11/2005   SpO2 98%   BMI 35.87 kg/m    Body mass index is 35.87 kg/m .  Physical Exam  Constitutional:       Appearance: Normal appearance.   Eyes:      Conjunctiva/sclera: Conjunctivae normal.   Cardiovascular:      Rate and Rhythm: Normal rate and regular rhythm.      Heart sounds: Normal heart sounds.   Pulmonary:      Effort: Pulmonary effort is normal.      Breath sounds: Normal breath sounds.   Abdominal:      General: Abdomen is flat.      Palpations: Abdomen is soft.      Tenderness: There is no abdominal tenderness.   Musculoskeletal:      Right lower leg: Edema present.      Left lower leg: No edema.      Comments: Lymphedema left greater than right.  Increased varicosities right.   Skin:     General: Skin is warm and dry.      Findings: No rash.   Neurological:      Mental Status: She is alert.   Psychiatric:         Mood and Affect: Mood normal.                Signed Electronically by: DUTCH Oliva CNP    "

## 2024-03-15 PROBLEM — Z96.659 S/P REVISION OF TOTAL KNEE: Status: RESOLVED | Noted: 2023-05-08 | Resolved: 2024-03-15

## 2024-03-15 PROBLEM — K92.2 UPPER GI BLEED: Status: RESOLVED | Noted: 2023-10-31 | Resolved: 2024-03-15

## 2024-03-15 PROBLEM — Z87.11 HISTORY OF GASTRIC ULCER: Status: ACTIVE | Noted: 2024-03-15

## 2024-03-15 PROBLEM — D62 ANEMIA DUE TO BLOOD LOSS, ACUTE: Status: RESOLVED | Noted: 2023-10-31 | Resolved: 2024-03-15

## 2024-03-15 LAB
IRON BINDING CAPACITY (ROCHE): 341 UG/DL (ref 240–430)
IRON SATN MFR SERPL: 29 % (ref 15–46)
IRON SERPL-MCNC: 100 UG/DL (ref 37–145)

## 2024-03-15 ASSESSMENT — ENCOUNTER SYMPTOMS
NAUSEA: 0
CARDIOVASCULAR NEGATIVE: 1
VOMITING: 0
PSYCHIATRIC NEGATIVE: 1
CONSTITUTIONAL NEGATIVE: 1
ABDOMINAL PAIN: 0
HEARTBURN: 1
HEMATOCHEZIA: 0
RESPIRATORY NEGATIVE: 1
DIARRHEA: 0

## 2024-03-15 NOTE — ASSESSMENT & PLAN NOTE
Ongoing knee pain and lymphedema which is longstanding left greater than right.  Discussed options for management which are limited given intolerance to NSAIDS.  Refilled tramadol, pt aware of possible addictive nature/dependence with this medication.  Is using sparingly.  PDMP reviewed.  Continue with ice, heat, consider referral to lymphedema if pt wishes.

## 2024-03-15 NOTE — ASSESSMENT & PLAN NOTE
10/2023-- admission to hospital with acute bleed  Related to use of NSAIDS  1/2024 EGD shows ulcers have healed.   Also noted schatzki's ring and hiatal hernia.  Patient has been off pantoprozole and has significant heartburn presently.  Has abstained from NSAIDS.    Plan:  Will check blood count and ferritin.  May need ongoing management for GERD even though gastric ulcers have healed.

## 2024-03-16 LAB — FERRITIN SERPL-MCNC: 29 NG/ML (ref 11–328)

## 2024-04-21 DIAGNOSIS — Z87.11 HISTORY OF GASTRIC ULCER: ICD-10-CM

## 2024-04-22 RX ORDER — PANTOPRAZOLE SODIUM 20 MG/1
40 TABLET, DELAYED RELEASE ORAL DAILY
Qty: 180 TABLET | Refills: 1 | Status: SHIPPED | OUTPATIENT
Start: 2024-04-22

## 2024-05-23 ENCOUNTER — OFFICE VISIT (OUTPATIENT)
Dept: FAMILY MEDICINE | Facility: CLINIC | Age: 69
End: 2024-05-23
Payer: MEDICARE

## 2024-05-23 VITALS
DIASTOLIC BLOOD PRESSURE: 82 MMHG | HEART RATE: 68 BPM | SYSTOLIC BLOOD PRESSURE: 130 MMHG | RESPIRATION RATE: 11 BRPM | OXYGEN SATURATION: 99 % | TEMPERATURE: 97.9 F | BODY MASS INDEX: 36.53 KG/M2 | WEIGHT: 241 LBS | HEIGHT: 68 IN

## 2024-05-23 DIAGNOSIS — J01.90 ACUTE BACTERIAL RHINOSINUSITIS: Primary | ICD-10-CM

## 2024-05-23 DIAGNOSIS — B96.89 ACUTE BACTERIAL RHINOSINUSITIS: Primary | ICD-10-CM

## 2024-05-23 PROCEDURE — 99213 OFFICE O/P EST LOW 20 MIN: CPT

## 2024-05-23 RX ORDER — RESPIRATORY SYNCYTIAL VIRUS VACCINE 120MCG/0.5
0.5 KIT INTRAMUSCULAR ONCE
Qty: 1 EACH | Refills: 0 | Status: CANCELLED | OUTPATIENT
Start: 2024-05-23 | End: 2024-05-23

## 2024-05-23 RX ORDER — MULTIVITAMIN
1 TABLET ORAL DAILY
COMMUNITY

## 2024-05-23 RX ORDER — CEFDINIR 300 MG/1
300 CAPSULE ORAL 2 TIMES DAILY
Qty: 20 CAPSULE | Refills: 0 | Status: SHIPPED | OUTPATIENT
Start: 2024-05-23 | End: 2024-06-02

## 2024-05-23 ASSESSMENT — ENCOUNTER SYMPTOMS: COUGH: 1

## 2024-05-23 ASSESSMENT — PAIN SCALES - GENERAL: PAINLEVEL: MODERATE PAIN (4)

## 2024-05-23 NOTE — PROGRESS NOTES
Assessment & Plan     (J01.90,  B96.89) Acute bacterial rhinosinusitis  (primary encounter diagnosis)  Comment: Will treat patient for acute bacterial rhinosinusitis given duration of illness.  Patient lung exam negative, reassuring on a pneumonia standpoint.  However, discussed with patient to follow-up next week if not noting any improvement with cefdinir given her shortness of breath.  Discussed with patient to get adequate rest, push fluids.  Over-the-counter products as needed to treat symptoms. Discussed medication risks and benefits of cefdinir with patient in detail with patient verbal understanding. Patient fully understands and is agreeable with plan of care, at this point patient will follow up as needed unless acute concerns arise in the meantime.  Plan: cefdinir (OMNICEF) 300 MG capsule        Follow-up if not feeling any better over the next 1-2 weeks    Ani Villanueva is a 69 year old, presenting for the following health issues:  shortness of breath and Cough (4 weeks)        5/23/2024     8:07 AM   Additional Questions   Roomed by Becky Stock     Cough       Acute Illness  Acute illness concerns: Cough and Shortness of Breath  Onset/Duration: 4 weeks  Symptoms:  Fever: YES- initially for a couple of days otherwise no  Chills/Sweats: YES  Headache (location?): YES- normally everyday  Sinus Pressure: No  Conjunctivitis:  No  Ear Pain: YES: right  Rhinorrhea: YES  Congestion: YES  Sore Throat: No  Cough: YES-productive of yellow sputum, waxing and waning over time  Wheeze: YES- when active  Decreased Appetite: No  Nausea: No  Vomiting: No  Diarrhea: YES- off/on  Dysuria/Freq.: No  Dysuria or Hematuria: No  Fatigue/Achiness: YES  Sick/Strep Exposure: YES /   Therapies tried and outcome: tylenlol    Respirations ranging from 10-12 while being roomed. Pt states SOB is worse when being active.     Review of Systems  Constitutional, HEENT, cardiovascular, pulmonary, gi and gu systems are  "negative, except as otherwise noted.      Objective    /82 (BP Location: Right arm, Patient Position: Sitting, Cuff Size: Adult Large)   Pulse 68   Temp 97.9  F (36.6  C) (Oral)   Resp 11   Ht 1.715 m (5' 7.5\")   Wt 109.3 kg (241 lb)   LMP 09/11/2005   SpO2 99%   BMI 37.19 kg/m    Body mass index is 37.19 kg/m .  Physical Exam  Vitals and nursing note reviewed.   Constitutional:       General: She is not in acute distress.     Appearance: Normal appearance. She is not ill-appearing.   HENT:      Right Ear: Tympanic membrane, ear canal and external ear normal. There is no impacted cerumen.      Left Ear: Tympanic membrane, ear canal and external ear normal. There is no impacted cerumen.      Nose: Congestion present. No rhinorrhea.      Mouth/Throat:      Mouth: Mucous membranes are moist.      Pharynx: Posterior oropharyngeal erythema present. No oropharyngeal exudate.   Eyes:      General:         Right eye: No discharge.         Left eye: No discharge.      Conjunctiva/sclera: Conjunctivae normal.      Pupils: Pupils are equal, round, and reactive to light.   Cardiovascular:      Rate and Rhythm: Normal rate and regular rhythm.      Heart sounds: No murmur heard.     No friction rub. No gallop.   Pulmonary:      Effort: No respiratory distress.      Breath sounds: Normal breath sounds. No stridor. No wheezing, rhonchi or rales.   Musculoskeletal:      Cervical back: No tenderness.   Lymphadenopathy:      Cervical: Cervical adenopathy present.   Skin:     General: Skin is warm and dry.   Neurological:      Mental Status: She is alert.   Psychiatric:         Mood and Affect: Mood normal.         Behavior: Behavior normal.         Thought Content: Thought content normal.         Judgment: Judgment normal.        Signed Electronically by: DUTCH Flores CNP    "

## 2024-05-23 NOTE — PATIENT INSTRUCTIONS
Cefdinir to treat sinus infection    Lots of rest, push fluids    Follow up if not feeling better over the next 1-2 weeks

## 2024-06-27 ENCOUNTER — TELEPHONE (OUTPATIENT)
Dept: FAMILY MEDICINE | Facility: CLINIC | Age: 69
End: 2024-06-27

## 2024-06-27 NOTE — TELEPHONE ENCOUNTER
Patient Quality Outreach    Patient is due for the following:   Chronic Opioid Use -  Treatment Agreement (CSA), Urine Drug Screen, ANABEL-7, and PHQ-9    Next Steps:   Schedule a office visit for Med check    Type of outreach:    Sent [employer] message.      Questions for provider review:    None           Jojo Stephenson

## 2024-07-31 ENCOUNTER — TRANSFERRED RECORDS (OUTPATIENT)
Dept: HEALTH INFORMATION MANAGEMENT | Facility: CLINIC | Age: 69
End: 2024-07-31
Payer: MEDICARE

## 2024-08-14 ENCOUNTER — TELEPHONE (OUTPATIENT)
Dept: FAMILY MEDICINE | Facility: CLINIC | Age: 69
End: 2024-08-14

## 2024-08-14 NOTE — LETTER
August 14, 2024      Rich Terry  [address] [address]        Dear Rich,       We care about your health and have reviewed your health plan including your medical conditions, medications, and lab results.  Based on this review, it is recommended that you follow up regarding the following health topic(s):  -office visit for med check         Please call us at the [facility] - [phone] (or use [employer]) to address the above recommendations.     Thank you for trusting [facility] and we appreciate the opportunity to serve you.  We look forward to supporting your healthcare needs in the future.    Healthy Regards,    Your Health Care Team  [facility]

## 2024-08-14 NOTE — TELEPHONE ENCOUNTER
Patient Quality Outreach    Patient is due for the following:   Chronic Opioid Use -  Treatment Agreement (CSA), Urine Drug Screen, ANABEL-7, and PHQ-9    Next Steps:   Schedule a office visit for med check    Type of outreach:    Sent letter.      Questions for provider review:    None           Jojo Stephenson

## 2024-08-23 DIAGNOSIS — I25.10 CORONARY ARTERY DISEASE INVOLVING NATIVE CORONARY ARTERY OF NATIVE HEART WITHOUT ANGINA PECTORIS: ICD-10-CM

## 2024-08-23 RX ORDER — ROSUVASTATIN CALCIUM 40 MG/1
40 TABLET, COATED ORAL DAILY
Qty: 90 TABLET | Refills: 1 | Status: SHIPPED | OUTPATIENT
Start: 2024-08-23

## 2024-10-03 ENCOUNTER — TELEPHONE (OUTPATIENT)
Dept: CARDIOLOGY | Facility: CLINIC | Age: 69
End: 2024-10-03
Payer: MEDICARE

## 2024-10-03 NOTE — TELEPHONE ENCOUNTER
1st attempt- Left voicemail for the patient to call back and schedule the following:    Appointment type:  Return Cardiology  Provider:  Dr Wilson  Return date:  annual visit - reschedule with Dr. Wilson next available andrade   Additional appointment(s) needed:  orphan  Additonal Notes:    Specialty phone number: [phone]

## 2024-10-12 DIAGNOSIS — Z87.11 HISTORY OF GASTRIC ULCER: ICD-10-CM

## 2024-10-14 ENCOUNTER — LAB (OUTPATIENT)
Dept: LAB | Facility: CLINIC | Age: 69
End: 2024-10-14
Payer: MEDICARE

## 2024-10-14 DIAGNOSIS — I25.10 CORONARY ARTERY DISEASE INVOLVING NATIVE CORONARY ARTERY OF NATIVE HEART WITHOUT ANGINA PECTORIS: ICD-10-CM

## 2024-10-14 LAB
ALT SERPL W P-5'-P-CCNC: 24 U/L (ref 0–50)
CHOLEST SERPL-MCNC: 171 MG/DL
FASTING STATUS PATIENT QL REPORTED: YES
HDLC SERPL-MCNC: 100 MG/DL
LDLC SERPL CALC-MCNC: 52 MG/DL
NONHDLC SERPL-MCNC: 71 MG/DL
TRIGL SERPL-MCNC: 97 MG/DL

## 2024-10-14 PROCEDURE — 84460 ALANINE AMINO (ALT) (SGPT): CPT | Performed by: NURSE PRACTITIONER

## 2024-10-14 PROCEDURE — 36415 COLL VENOUS BLD VENIPUNCTURE: CPT | Performed by: NURSE PRACTITIONER

## 2024-10-14 PROCEDURE — 80061 LIPID PANEL: CPT | Performed by: NURSE PRACTITIONER

## 2024-10-14 RX ORDER — PANTOPRAZOLE SODIUM 20 MG/1
40 TABLET, DELAYED RELEASE ORAL DAILY
Qty: 180 TABLET | Refills: 1 | Status: SHIPPED | OUTPATIENT
Start: 2024-10-14

## 2024-10-17 ENCOUNTER — OFFICE VISIT (OUTPATIENT)
Dept: CARDIOLOGY | Facility: CLINIC | Age: 69
End: 2024-10-17
Payer: MEDICARE

## 2024-10-17 VITALS
OXYGEN SATURATION: 98 % | BODY MASS INDEX: 36.66 KG/M2 | HEART RATE: 91 BPM | WEIGHT: 241.9 LBS | SYSTOLIC BLOOD PRESSURE: 117 MMHG | DIASTOLIC BLOOD PRESSURE: 83 MMHG | HEIGHT: 68 IN

## 2024-10-17 DIAGNOSIS — I51.89 DIASTOLIC DYSFUNCTION: ICD-10-CM

## 2024-10-17 DIAGNOSIS — R06.09 DOE (DYSPNEA ON EXERTION): Primary | ICD-10-CM

## 2024-10-17 DIAGNOSIS — I25.10 CORONARY ARTERY DISEASE INVOLVING NATIVE CORONARY ARTERY OF NATIVE HEART WITHOUT ANGINA PECTORIS: ICD-10-CM

## 2024-10-17 PROCEDURE — 99214 OFFICE O/P EST MOD 30 MIN: CPT | Performed by: INTERNAL MEDICINE

## 2024-10-17 RX ORDER — FUROSEMIDE 20 MG/1
20 TABLET ORAL DAILY
Qty: 90 TABLET | Refills: 3 | Status: SHIPPED | OUTPATIENT
Start: 2024-10-17 | End: 2024-10-24

## 2024-10-17 RX ORDER — POTASSIUM CHLORIDE 750 MG/1
10 TABLET, EXTENDED RELEASE ORAL 2 TIMES DAILY
Qty: 90 TABLET | Refills: 3 | Status: SHIPPED | OUTPATIENT
Start: 2024-10-17 | End: 2024-10-24

## 2024-10-17 NOTE — LETTER
"10/17/2024    Ragini Stevens MD  [id number]  [facility] [address]    RE: Rich FELICITA Harrison       Dear Colleague,     I had the pleasure of seeing Rich Terry in the [facility].      Cardiology Clinic Progress Note:    October 17, 2024   Patient Name: Rich Terry  Patient MRN: [id number]     Consult indication: CAD    HPI:    I had the opportunity to see patient Rich Terry in cardiology clinic for a follow up visit. Patient is followed by our colleague Ragini Stevens MD with Primary Care.     Patient is a pleasant 69 year old female with a past medical history significant for right bundle branch block, nonobstructive CAD, dyslipidemia, obesity, diastolic dysfunction, who presents for follow-up.    Cardiac history dates back to 2006, abnormal stress test demonstrating moderate-sized anterior defect concerning for ischemia, coronary CTA was recommended at that time but was not yet scheduled.  I had seen her in clinic 3/2023 for preoperative assessment prior to knee surgery.  Coronary CTA 3/2023 demonstrated only mild nonobstructive CAD.  Calcium score 302.  TTE 3/27/2023 LVEF 55 to 60%, normal RV function, no significant valvular abnormalities.  Patient then was hospitalized for GI bleed, hemoglobin was down to 6.5.  EGD 1/2024 demonstrated healed ulcers, it was noted that patient may restart aspirin 81 mg daily however should take pantoprazole with this.    Overall patient reports that she has been doing reasonably well, though over the last few months has noted intermittent \"leg heaviness\" with associated dyspnea.  Patient has a history of varicose veins and prior vein therapy for this, though feels that these symptoms are different from symptomatic varicose veins.  She denies any chest pain, chest pressure, normal shortness of breath.    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    Assessment and Plan/Recommendations:    # Dyspnea on exertion, mild, suspect mild HFpEF given diastolic dysfunction on prior TTE  # Non obstructive CAD on coronary CTA 3/2023, no angina  # GIB, healed ulcers on EGD 1/2024  # HL, on statin    - Reviewed prior cardiac history in detail  - Recommend low-dose furosemide 20 mg daily with KCl 10 mill equivalents daily  - Labs in 1 week  - Continue statin  - Discussed that aspirin could be restarted with pantoprazole per GI recommendations, patient would like to hold off for now  - Follow-up with Halle Callaway NP in 3 months with TTE and labs at that time, if stable, can follow-up in cardiology clinic as needed in the future    Thank you for allowing our team to participate in the care of Rich Terry.  Please do not hesitate to call or page me with any questions or concerns.    Sincerely,     Teodoro Wilson MD, [facility]  Cardiology  Text Page   October 17, 2024    cc  DUTCH Barton CNP  [address]  [place] [address]    Voice recognition software utilized.     Total time spent on this encounter today: Greater than 30 minutes, providing care in this encounter including, but not limited to, reviewing prior medical records, laboratory data, imaging studies, diagnostic studies, procedure notes, formulating an assessment and plan, recommendations, discussion and counseling with patient face to face, dictation.    Past Medical History:     Past Medical History:   Diagnosis Date     Gastrointestinal hemorrhage associated with gastric ulcer 11/2023    d/d NSAIDs     Generalized osteoarthrosis, unspecified site      Hypercholesteremia      Pain in right knee      Phlebitis and thrombophlebitis of superficial vessels of lower extremities     several episodes     Thrombosis of leg     superficial, not deep     Varicose veins of lower extremities with ulcer (H)         Past Surgical History:   Past Surgical History:    Procedure Laterality Date     ARTHROPLASTY KNEE Right 11/2/2015    Procedure: ARTHROPLASTY KNEE;  Surgeon: Prasad Valladares MD;  Location: [facility] OR     COLONOSCOPY N/A 2/7/2024    Procedure: Colonoscopy with biopsy using biopsy forceps;  Surgeon: Gill Hauser MD;  Location: [facility] GI     ESOPHAGOSCOPY, GASTROSCOPY, DUODENOSCOPY (EGD), COMBINED N/A 11/1/2023    Procedure: Esophagoscopy, gastroscopy, duodenoscopy (EGD), combined biopsies of stomach with regular forceps;  Surgeon: Joey Paredes MD;  Location: [facility] GI     EXCHANGE POLY COMPONENT ARTHROPLASTY KNEE Left 5/8/2023    Procedure: Polyethylene exchange of left total knee arthroplasty using Dayanna NexGen LPS knee;  Surgeon: Prasad Valladares MD;  Location: [facility] OR     saphenous vein radiofrequency ablation[  7/6/2012     SURGICAL HISTORY OF -   4/04    rt LE varicose vein procedure     SURGICAL HISTORY OF -   1-19-05    Tibial osteotomy (lt leg)     varicose vein avulsions/stab phlebectomy[  7/6/2012     [facility] NONSPECIFIC PROCEDURE      4 knee surgeries (all left knee)     [facility] NONSPECIFIC PROCEDURE      (L) planter fascia surgery       Medications (outpatient):  Current Outpatient Medications   Medication Sig Dispense Refill     furosemide (LASIX) 20 MG tablet Take 1 tablet (20 mg) by mouth daily. 90 tablet 3     multivitamin w/minerals (MULTI-VITAMIN) tablet Take 1 tablet by mouth daily       pantoprazole (PROTONIX) 20 MG EC tablet TAKE 2 TABLETS BY MOUTH EVERY  tablet 1     potassium chloride teja ER (KLOR-CON M10) 10 MEQ CR tablet Take 1 tablet (10 mEq) by mouth 2 times daily. 90 tablet 3     rosuvastatin (CRESTOR) 40 MG tablet Take 1 tablet (40 mg) by mouth daily. 90 tablet 1     VITAMIN D PO Take 1 tablet by mouth daily.         Allergies:  Allergies   Allergen Reactions     Levaquin Muscle Pain (Myalgia)     Shrimp      throat swells     Sulfa Antibiotics Rash     rash       Social History:   History   Drug Use No      History  "  Smoking Status     Former     Types: Cigarettes   Smokeless Tobacco     Never     Social History    Substance and Sexual Activity      Alcohol use: Not Currently       Family History:  Family History   Problem Relation Age of Onset     Cancer Mother         cervical ca age 36     Heart Disease Mother         A-FIB     Skin Cancer Mother         on nose     Heart Disease Father          CHF age 51     Diabetes Maternal Grandfather      Cancer Maternal Grandmother         uterine ca dx in her 60's     Diabetes Paternal Grandfather      Hypertension Brother      Cancer Maternal Aunt         ovarian ca in her 40's     Diabetes Paternal Uncle         multiple Puncles with DM       Review of Systems:   A complete review of systems was negative except as mentioned in the History of Present Illness.     Objective & Physical Exam:  /83   Pulse 91   Ht 1.715 m (5' 7.5\")   Wt 109.7 kg (241 lb 14.4 oz)   LMP 2005   SpO2 98%   BMI 37.33 kg/m    Wt Readings from Last 2 Encounters:   10/17/24 109.7 kg (241 lb 14.4 oz)   24 109.3 kg (241 lb)     Body mass index is 37.33 kg/m .   Body surface area is 2.29 meters squared.    Constitutional: appears stated age, in no apparent distress, appears to be well nourished  Pulmonary: clear to auscultation bilaterally, no wheezes, no rales, no increased work of breathing  Cardiovascular: JVP mildly elevated above clavicle at 90', regular rate, regular rhythm, no murmur appreciated, trace BLE edema, prominent varicose veins BLE  Gastrointestinal: no guarding, non-rigid   Neurologic: awake, alert, moves all extremities  Skin: no jaundice, warm on limited exam    Data reviewed:  Lab Results   Component Value Date    WBC 9.7 2024    WBC 8.3 2021    RBC 4.50 2024    RBC 4.62 2021    HGB 12.8 2024    HGB 13.9 2021    HCT 40.0 2024    HCT 43.3 2021    MCV 89 2024    MCV 94 2021    MCH 28.4 2024    MCH 30.1 " 05/11/2021    MCHC 32.0 03/14/2024    MCHC 32.1 05/11/2021    RDW 15.4 (H) 03/14/2024    RDW 13.4 05/11/2021     03/14/2024     05/11/2021     Sodium   Date Value Ref Range Status   11/02/2023 141 135 - 145 mmol/L Final     Comment:     Reference intervals for this test were updated on 09/26/2023 to more accurately reflect our healthy population. There may be differences in the flagging of prior results with similar values performed with this method. Interpretation of those prior results can be made in the context of the updated reference intervals.    06/11/2018 144 133 - 144 mmol/L Final     Potassium   Date Value Ref Range Status   11/02/2023 3.7 3.4 - 5.3 mmol/L Final   07/13/2021 4.4 3.4 - 5.3 mmol/L Final   06/11/2018 4.7 3.4 - 5.3 mmol/L Final     Chloride   Date Value Ref Range Status   11/02/2023 110 (H) 98 - 107 mmol/L Final   07/13/2021 107 mmol/L Final   06/11/2018 109 94 - 109 mmol/L Final     Carbon Dioxide   Date Value Ref Range Status   06/11/2018 22 20 - 32 mmol/L Final     Carbon Dioxide (CO2)   Date Value Ref Range Status   11/02/2023 25 22 - 29 mmol/L Final   07/13/2021 25 20 - 32 mmol/L Final     Anion Gap   Date Value Ref Range Status   11/02/2023 6 (L) 7 - 15 mmol/L Final   07/13/2021 9 3 - 14 mmol/L Final   06/11/2018 13 3 - 14 mmol/L Final     Glucose   Date Value Ref Range Status   11/02/2023 102 (H) 70 - 99 mg/dL Final   07/13/2021 98 70 - 99 mg/dL Final   06/11/2018 96 70 - 99 mg/dL Final     Comment:     Fasting specimen     GLUCOSE BY METER POCT   Date Value Ref Range Status   05/09/2023 109 (H) 70 - 99 mg/dL Final     Comment:     /RN Notified     Urea Nitrogen   Date Value Ref Range Status   11/02/2023 6.9 (L) 8.0 - 23.0 mg/dL Final   07/13/2021 10 7 - 30 mg/dL Final   06/11/2018 10 7 - 30 mg/dL Final     Creatinine   Date Value Ref Range Status   11/02/2023 0.70 0.51 - 0.95 mg/dL Final   06/11/2018 0.81 0.52 - 1.04 mg/dL Final     GFR Estimate   Date Value Ref Range  Status   11/02/2023 >90 >60 mL/min/1.73m2 Final   06/11/2018 71 >60 mL/min/1.7m2 Final     Comment:     Non  GFR Calc     Calcium   Date Value Ref Range Status   11/02/2023 8.7 (L) 8.8 - 10.2 mg/dL Final   06/11/2018 9.7 8.5 - 10.1 mg/dL Final     Bilirubin Total   Date Value Ref Range Status   10/31/2023 0.2 <=1.2 mg/dL Final   06/11/2018 0.3 0.2 - 1.3 mg/dL Final     Alkaline Phosphatase   Date Value Ref Range Status   10/31/2023 74 35 - 104 U/L Final   06/11/2018 112 40 - 150 U/L Final     ALT   Date Value Ref Range Status   10/14/2024 24 0 - 50 U/L Final   06/11/2018 34 0 - 50 U/L Final     AST   Date Value Ref Range Status   10/31/2023 22 0 - 45 U/L Final     Comment:     Reference intervals for this test were updated on 6/12/2023 to more accurately reflect our healthy population. There may be differences in the flagging of prior results with similar values performed with this method. Interpretation of those prior results can be made in the context of the updated reference intervals.   06/11/2018 42 0 - 45 U/L Final     Recent Labs   Lab Test 10/14/24  0741 12/04/23  0808   CHOL 171 177    104   LDL 52 56   TRIG 97 86      Lab Results   Component Value Date    A1C 6.0 10/26/2015    A1C 6.2 06/02/2015        Thank you for allowing me to participate in the care of your patient.      Sincerely,     Teodoro Wilson MD     [facility]  cc:   Halle Callaway, APRN CNP  [address]  [place] [address]

## 2024-10-17 NOTE — PATIENT INSTRUCTIONS
October 17, 2024    Thank you for allowing our Cardiology team to participate in your care.     Please note the following changes to your heart treatment plan:     Medication changes:   - start furosemide 20mg daily  - start KCL 10meq daily    Tests to be done:  - labs in 1 week  - labs in 3 months  - TTE in 3 months    Follow up:  - Follow up in 3 months, or sooner as needed      For scheduling, please call [phone].      Please contact our team through [employer] or our Nurse Team Voicemail service [phone], or the General Clinic [phone] for any questions or concerns.     If you are having a medical emergency, please call [phone].     Sincerely,    Teodoro Wilson MD, FAC  Cardiology    [facility] [place]

## 2024-10-17 NOTE — PROGRESS NOTES
"    Cardiology Clinic Progress Note:    October 17, 2024   Patient Name: Rich Terry  Patient MRN: [id number]     Consult indication: CAD    HPI:    I had the opportunity to see patient Rich Terry in cardiology clinic for a follow up visit. Patient is followed by our colleague Ragini Stevens MD with Primary Care.     Patient is a pleasant 69 year old female with a past medical history significant for right bundle branch block, nonobstructive CAD, dyslipidemia, obesity, diastolic dysfunction, who presents for follow-up.    Cardiac history dates back to 2006, abnormal stress test demonstrating moderate-sized anterior defect concerning for ischemia, coronary CTA was recommended at that time but was not yet scheduled.  I had seen her in clinic 3/2023 for preoperative assessment prior to knee surgery.  Coronary CTA 3/2023 demonstrated only mild nonobstructive CAD.  Calcium score 302.  TTE 3/27/2023 LVEF 55 to 60%, normal RV function, no significant valvular abnormalities.  Patient then was hospitalized for GI bleed, hemoglobin was down to 6.5.  EGD 1/2024 demonstrated healed ulcers, it was noted that patient may restart aspirin 81 mg daily however should take pantoprazole with this.    Overall patient reports that she has been doing reasonably well, though over the last few months has noted intermittent \"leg heaviness\" with associated dyspnea.  Patient has a history of varicose veins and prior vein therapy for this, though feels that these symptoms are different from symptomatic varicose veins.  She denies any chest pain, chest pressure, normal shortness of breath.    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    Assessment and Plan/Recommendations:    # Dyspnea on exertion, mild, suspect mild HFpEF given diastolic dysfunction on prior TTE  # Non obstructive CAD on coronary CTA 3/2023, " no angina  # GIB, healed ulcers on EGD 1/2024  # HL, on statin    - Reviewed prior cardiac history in detail  - Recommend low-dose furosemide 20 mg daily with KCl 10 mill equivalents daily  - Labs in 1 week  - Continue statin  - Discussed that aspirin could be restarted with pantoprazole per GI recommendations, patient would like to hold off for now  - Follow-up with Halle Callaway NP in 3 months with TTE and labs at that time, if stable, can follow-up in cardiology clinic as needed in the future    Thank you for allowing our team to participate in the care of Rich Terry.  Please do not hesitate to call or page me with any questions or concerns.    Sincerely,     Teodoro Wilson MD, [facility]  Cardiology  Text Page   October 17, 2024    cc  DUTCH Barton CNP  [address]  [place] [address]    Voice recognition software utilized.     Total time spent on this encounter today: Greater than 30 minutes, providing care in this encounter including, but not limited to, reviewing prior medical records, laboratory data, imaging studies, diagnostic studies, procedure notes, formulating an assessment and plan, recommendations, discussion and counseling with patient face to face, dictation.    Past Medical History:     Past Medical History:   Diagnosis Date    Gastrointestinal hemorrhage associated with gastric ulcer 11/2023    d/d NSAIDs    Generalized osteoarthrosis, unspecified site     Hypercholesteremia     Pain in right knee     Phlebitis and thrombophlebitis of superficial vessels of lower extremities     several episodes    Thrombosis of leg     superficial, not deep    Varicose veins of lower extremities with ulcer (H)         Past Surgical History:   Past Surgical History:   Procedure Laterality Date    ARTHROPLASTY KNEE Right 11/2/2015    Procedure: ARTHROPLASTY KNEE;  Surgeon: Prasad Valladares MD;  Location: [facility] OR    COLONOSCOPY N/A 2/7/2024    Procedure: Colonoscopy with biopsy  using biopsy forceps;  Surgeon: Gill Hauser MD;  Location: [facility] GI    ESOPHAGOSCOPY, GASTROSCOPY, DUODENOSCOPY (EGD), COMBINED N/A 11/1/2023    Procedure: Esophagoscopy, gastroscopy, duodenoscopy (EGD), combined biopsies of stomach with regular forceps;  Surgeon: Joey Paredes MD;  Location: [facility] GI    EXCHANGE POLY COMPONENT ARTHROPLASTY KNEE Left 5/8/2023    Procedure: Polyethylene exchange of left total knee arthroplasty using Dayanna NexGen LPS knee;  Surgeon: Prasad Valladares MD;  Location: [facility] OR    saphenous vein radiofrequency ablation[  7/6/2012    SURGICAL HISTORY OF -   4/04    rt LE varicose vein procedure    SURGICAL HISTORY OF -   1-19-05    Tibial osteotomy (lt leg)    varicose vein avulsions/stab phlebectomy[  7/6/2012    [facility] NONSPECIFIC PROCEDURE      4 knee surgeries (all left knee)    [facility] NONSPECIFIC PROCEDURE      (L) planter fascia surgery       Medications (outpatient):  Current Outpatient Medications   Medication Sig Dispense Refill    furosemide (LASIX) 20 MG tablet Take 1 tablet (20 mg) by mouth daily. 90 tablet 3    multivitamin w/minerals (MULTI-VITAMIN) tablet Take 1 tablet by mouth daily      pantoprazole (PROTONIX) 20 MG EC tablet TAKE 2 TABLETS BY MOUTH EVERY  tablet 1    potassium chloride teja ER (KLOR-CON M10) 10 MEQ CR tablet Take 1 tablet (10 mEq) by mouth 2 times daily. 90 tablet 3    rosuvastatin (CRESTOR) 40 MG tablet Take 1 tablet (40 mg) by mouth daily. 90 tablet 1    VITAMIN D PO Take 1 tablet by mouth daily.         Allergies:  Allergies   Allergen Reactions    Levaquin Muscle Pain (Myalgia)    Shrimp      throat swells    Sulfa Antibiotics Rash     rash       Social History:   History   Drug Use No      History   Smoking Status    Former    Types: Cigarettes   Smokeless Tobacco    Never     Social History    Substance and Sexual Activity      Alcohol use: Not Currently       Family History:  Family History   Problem Relation Age of Onset    Cancer  "Mother         cervical ca age 36    Heart Disease Mother         A-FIB    Skin Cancer Mother         on nose    Heart Disease Father          CHF age 51    Diabetes Maternal Grandfather     Cancer Maternal Grandmother         uterine ca dx in her 60's    Diabetes Paternal Grandfather     Hypertension Brother     Cancer Maternal Aunt         ovarian ca in her 40's    Diabetes Paternal Uncle         multiple Puncles with DM       Review of Systems:   A complete review of systems was negative except as mentioned in the History of Present Illness.     Objective & Physical Exam:  /83   Pulse 91   Ht 1.715 m (5' 7.5\")   Wt 109.7 kg (241 lb 14.4 oz)   LMP 2005   SpO2 98%   BMI 37.33 kg/m    Wt Readings from Last 2 Encounters:   10/17/24 109.7 kg (241 lb 14.4 oz)   24 109.3 kg (241 lb)     Body mass index is 37.33 kg/m .   Body surface area is 2.29 meters squared.    Constitutional: appears stated age, in no apparent distress, appears to be well nourished  Pulmonary: clear to auscultation bilaterally, no wheezes, no rales, no increased work of breathing  Cardiovascular: JVP mildly elevated above clavicle at 90', regular rate, regular rhythm, no murmur appreciated, trace BLE edema, prominent varicose veins BLE  Gastrointestinal: no guarding, non-rigid   Neurologic: awake, alert, moves all extremities  Skin: no jaundice, warm on limited exam    Data reviewed:  Lab Results   Component Value Date    WBC 9.7 2024    WBC 8.3 2021    RBC 4.50 2024    RBC 4.62 2021    HGB 12.8 2024    HGB 13.9 2021    HCT 40.0 2024    HCT 43.3 2021    MCV 89 2024    MCV 94 2021    MCH 28.4 2024    MCH 30.1 2021    MCHC 32.0 2024    MCHC 32.1 2021    RDW 15.4 (H) 2024    RDW 13.4 2021     2024     2021     Sodium   Date Value Ref Range Status   2023 141 135 - 145 mmol/L Final     Comment:     " Reference intervals for this test were updated on 09/26/2023 to more accurately reflect our healthy population. There may be differences in the flagging of prior results with similar values performed with this method. Interpretation of those prior results can be made in the context of the updated reference intervals.    06/11/2018 144 133 - 144 mmol/L Final     Potassium   Date Value Ref Range Status   11/02/2023 3.7 3.4 - 5.3 mmol/L Final   07/13/2021 4.4 3.4 - 5.3 mmol/L Final   06/11/2018 4.7 3.4 - 5.3 mmol/L Final     Chloride   Date Value Ref Range Status   11/02/2023 110 (H) 98 - 107 mmol/L Final   07/13/2021 107 mmol/L Final   06/11/2018 109 94 - 109 mmol/L Final     Carbon Dioxide   Date Value Ref Range Status   06/11/2018 22 20 - 32 mmol/L Final     Carbon Dioxide (CO2)   Date Value Ref Range Status   11/02/2023 25 22 - 29 mmol/L Final   07/13/2021 25 20 - 32 mmol/L Final     Anion Gap   Date Value Ref Range Status   11/02/2023 6 (L) 7 - 15 mmol/L Final   07/13/2021 9 3 - 14 mmol/L Final   06/11/2018 13 3 - 14 mmol/L Final     Glucose   Date Value Ref Range Status   11/02/2023 102 (H) 70 - 99 mg/dL Final   07/13/2021 98 70 - 99 mg/dL Final   06/11/2018 96 70 - 99 mg/dL Final     Comment:     Fasting specimen     GLUCOSE BY METER POCT   Date Value Ref Range Status   05/09/2023 109 (H) 70 - 99 mg/dL Final     Comment:     /RN Notified     Urea Nitrogen   Date Value Ref Range Status   11/02/2023 6.9 (L) 8.0 - 23.0 mg/dL Final   07/13/2021 10 7 - 30 mg/dL Final   06/11/2018 10 7 - 30 mg/dL Final     Creatinine   Date Value Ref Range Status   11/02/2023 0.70 0.51 - 0.95 mg/dL Final   06/11/2018 0.81 0.52 - 1.04 mg/dL Final     GFR Estimate   Date Value Ref Range Status   11/02/2023 >90 >60 mL/min/1.73m2 Final   06/11/2018 71 >60 mL/min/1.7m2 Final     Comment:     Non  GFR Calc     Calcium   Date Value Ref Range Status   11/02/2023 8.7 (L) 8.8 - 10.2 mg/dL Final   06/11/2018 9.7 8.5 - 10.1  mg/dL Final     Bilirubin Total   Date Value Ref Range Status   10/31/2023 0.2 <=1.2 mg/dL Final   06/11/2018 0.3 0.2 - 1.3 mg/dL Final     Alkaline Phosphatase   Date Value Ref Range Status   10/31/2023 74 35 - 104 U/L Final   06/11/2018 112 40 - 150 U/L Final     ALT   Date Value Ref Range Status   10/14/2024 24 0 - 50 U/L Final   06/11/2018 34 0 - 50 U/L Final     AST   Date Value Ref Range Status   10/31/2023 22 0 - 45 U/L Final     Comment:     Reference intervals for this test were updated on 6/12/2023 to more accurately reflect our healthy population. There may be differences in the flagging of prior results with similar values performed with this method. Interpretation of those prior results can be made in the context of the updated reference intervals.   06/11/2018 42 0 - 45 U/L Final     Recent Labs   Lab Test 10/14/24  0741 12/04/23  0808   CHOL 171 177    104   LDL 52 56   TRIG 97 86      Lab Results   Component Value Date    A1C 6.0 10/26/2015    A1C 6.2 06/02/2015

## 2024-10-24 ENCOUNTER — TELEPHONE (OUTPATIENT)
Dept: CARDIOLOGY | Facility: CLINIC | Age: 69
End: 2024-10-24

## 2024-10-24 ENCOUNTER — LAB (OUTPATIENT)
Dept: LAB | Facility: CLINIC | Age: 69
End: 2024-10-24
Payer: MEDICARE

## 2024-10-24 DIAGNOSIS — R06.09 DOE (DYSPNEA ON EXERTION): ICD-10-CM

## 2024-10-24 LAB
ANION GAP SERPL CALCULATED.3IONS-SCNC: 10 MMOL/L (ref 7–15)
BUN SERPL-MCNC: 12.4 MG/DL (ref 8–23)
CALCIUM SERPL-MCNC: 9.9 MG/DL (ref 8.8–10.4)
CHLORIDE SERPL-SCNC: 102 MMOL/L (ref 98–107)
CREAT SERPL-MCNC: 0.98 MG/DL (ref 0.51–0.95)
EGFRCR SERPLBLD CKD-EPI 2021: 62 ML/MIN/1.73M2
GLUCOSE SERPL-MCNC: 108 MG/DL (ref 70–99)
HCO3 SERPL-SCNC: 25 MMOL/L (ref 22–29)
NT-PROBNP SERPL-MCNC: 112 PG/ML (ref 0–900)
POTASSIUM SERPL-SCNC: 3.9 MMOL/L (ref 3.4–5.3)
SODIUM SERPL-SCNC: 137 MMOL/L (ref 135–145)

## 2024-10-24 PROCEDURE — 36415 COLL VENOUS BLD VENIPUNCTURE: CPT | Performed by: INTERNAL MEDICINE

## 2024-10-24 PROCEDURE — 83880 ASSAY OF NATRIURETIC PEPTIDE: CPT | Performed by: INTERNAL MEDICINE

## 2024-10-24 PROCEDURE — 80048 BASIC METABOLIC PNL TOTAL CA: CPT | Performed by: INTERNAL MEDICINE

## 2024-10-24 NOTE — TELEPHONE ENCOUNTER
Message from Dr. Wilson re: labs  Teodoro Wilson MD  [facility] Heart Team 2  Results reviewed, please let the patient know that overall findings suggest dehydration, I recommend stopping the furosemide and the potassium supplement. If she has noticed improvement in her symptoms with the diuretic, could consider MWF dosing or as needed, can discuss at follow up visit   ==========================    Patient to see Tariq Naranjo on 1/21/2025 with labs and echo     1400 called patient to review lab results and Dr. Wilson's recommendations. Patient states the lasix did not seem to affect her dyspnea at all.  Patient will stop the lasix 20mg daily and Klor-con 10mEq CR BID and discuss further at her OV in January.

## 2024-10-29 ENCOUNTER — PATIENT OUTREACH (OUTPATIENT)
Dept: CARE COORDINATION | Facility: CLINIC | Age: 69
End: 2024-10-29
Payer: MEDICARE

## 2024-11-05 ENCOUNTER — PATIENT OUTREACH (OUTPATIENT)
Dept: CARE COORDINATION | Facility: CLINIC | Age: 69
End: 2024-11-05
Payer: MEDICARE

## 2024-11-19 ENCOUNTER — PATIENT OUTREACH (OUTPATIENT)
Dept: CARE COORDINATION | Facility: CLINIC | Age: 69
End: 2024-11-19
Payer: MEDICARE

## 2024-11-26 ENCOUNTER — PATIENT OUTREACH (OUTPATIENT)
Dept: CARE COORDINATION | Facility: CLINIC | Age: 69
End: 2024-11-26
Payer: MEDICARE

## 2025-01-16 ENCOUNTER — LAB (OUTPATIENT)
Dept: LAB | Facility: CLINIC | Age: 70
End: 2025-01-16
Payer: COMMERCIAL

## 2025-01-16 DIAGNOSIS — I51.89 DIASTOLIC DYSFUNCTION: ICD-10-CM

## 2025-01-16 DIAGNOSIS — R06.09 DOE (DYSPNEA ON EXERTION): ICD-10-CM

## 2025-01-16 LAB
ANION GAP SERPL CALCULATED.3IONS-SCNC: 12 MMOL/L (ref 7–15)
BUN SERPL-MCNC: 15.6 MG/DL (ref 8–23)
CALCIUM SERPL-MCNC: 10 MG/DL (ref 8.8–10.4)
CHLORIDE SERPL-SCNC: 102 MMOL/L (ref 98–107)
CREAT SERPL-MCNC: 0.92 MG/DL (ref 0.51–0.95)
EGFRCR SERPLBLD CKD-EPI 2021: 67 ML/MIN/1.73M2
GLUCOSE SERPL-MCNC: 115 MG/DL (ref 70–99)
HCO3 SERPL-SCNC: 25 MMOL/L (ref 22–29)
NT-PROBNP SERPL-MCNC: 174 PG/ML (ref 0–900)
POTASSIUM SERPL-SCNC: 4.2 MMOL/L (ref 3.4–5.3)
SODIUM SERPL-SCNC: 139 MMOL/L (ref 135–145)

## 2025-01-22 ENCOUNTER — PATIENT OUTREACH (OUTPATIENT)
Dept: CARE COORDINATION | Facility: CLINIC | Age: 70
End: 2025-01-22
Payer: COMMERCIAL

## 2025-01-26 ENCOUNTER — HEALTH MAINTENANCE LETTER (OUTPATIENT)
Age: 70
End: 2025-01-26

## 2025-02-19 DIAGNOSIS — I25.10 CORONARY ARTERY DISEASE INVOLVING NATIVE CORONARY ARTERY OF NATIVE HEART WITHOUT ANGINA PECTORIS: ICD-10-CM

## 2025-02-19 RX ORDER — ROSUVASTATIN CALCIUM 40 MG/1
40 TABLET, COATED ORAL DAILY
Qty: 90 TABLET | Refills: 3 | Status: SHIPPED | OUTPATIENT
Start: 2025-02-19

## 2025-02-22 ENCOUNTER — HEALTH MAINTENANCE LETTER (OUTPATIENT)
Age: 70
End: 2025-02-22

## 2025-04-21 ENCOUNTER — TELEPHONE (OUTPATIENT)
Dept: FAMILY MEDICINE | Facility: CLINIC | Age: 70
End: 2025-04-21

## 2025-04-21 NOTE — LETTER
[facility]  [address] [facility] [facility] [address] [phone]       April 28, 2025    Rich Terry  [address] [facility] [address]    Dear Rich,    We care about your health and have reviewed your health plan and are making recommendations based on this review, to optimize your health.    You are in particular need of attention regarding:  -Wellness (Physical) Visit     We are recommending that you:  -schedule a WELLNESS (Physical) APPOINTMENT with me.   I will check fasting labs the same day - nothing to eat except water and meds for 8-10 hours prior.    Dr. Stevens is currently on a medical leave of absense with no return date. We are reccomending you schedule an appointment with another provider to make sure your medical needs are adressed.      In addition, here is a list of due or overdue Health Maintenance reminders.    Health Maintenance Due   Topic Date Due    URINE DRUG SCREEN  Never done    Zoster (Shingles) Vaccine (1 of 2) Never done    RSV VACCINE (1 - Risk 60-74 years 1-dose series) Never done    Diptheria Tetanus Pertussis (DTAP/TDAP/TD) Vaccine (2 - Td or Tdap) 04/08/2023    Pneumococcal Vaccine (2 of 2 - PCV) 06/27/2023    ANNUAL REVIEW OF HM ORDERS  05/04/2024    COVID-19 Vaccine (5 - 2024-25 season) 09/01/2024    Annual Wellness Visit  12/05/2024    FALL RISK ASSESSMENT  12/05/2024    PHQ-2 (once per calendar year)  01/01/2025       To address the above recommendations, we encourage you to contact us at [phone], via [employer] or by contacting Central Scheduling toll free at [phone] 24 hours a day. They will assist you with finding the most convenient time and location.    Thank you for trusting [facility] and we appreciate the opportunity to serve you.  We look forward to supporting your healthcare needs in the future.    Healthy Regards,    Your [facility] Team

## 2025-04-21 NOTE — LETTER
[facility]  [address] [address] [phone]       July 28, 2025    Rich Terry  [address] [facility] [address]    Dear Rich,    We care about your health and have reviewed your health plan and are making recommendations based on this review, to optimize your health.    You are in particular need of attention regarding:  -Wellness (Physical) Visit     We are recommending that you:  -schedule a WELLNESS (Physical) APPOINTMENT with me.   I will check fasting labs the same day - nothing to eat except water and meds for 8-10 hours prior.    In addition, here is a list of due or overdue Health Maintenance reminders.    Health Maintenance Due   Topic Date Due    URINE DRUG SCREEN  Never done    Zoster (Shingles) Vaccine (1 of 2) Never done    Osteoporosis Screening  08/21/2015    Diptheria Tetanus Pertussis (DTAP/TDAP/TD) Vaccine (2 - Td or Tdap) 04/08/2023    Pneumococcal Vaccine (2 of 2 - PCV) 06/27/2023    ANNUAL REVIEW OF HM ORDERS  05/04/2024    COVID-19 Vaccine (5 - 2024-25 season) 09/01/2024    Mammogram  11/28/2024    Annual Wellness Visit  12/05/2024    FALL RISK ASSESSMENT  12/05/2024    PHQ-2 (once per calendar year)  01/01/2025       To address the above recommendations, we encourage you to contact us at [phone], via [employer] or by contacting Central Scheduling toll free at [phone] 24 hours a day. They will assist you with finding the most convenient time and location.    Thank you for trusting [facility] and we appreciate the opportunity to serve you.  We look forward to supporting your healthcare needs in the future.    Healthy Regards,    Your [facility] Team

## 2025-04-21 NOTE — CONFIDENTIAL NOTE
Patient Quality Outreach    Patient is due for the following:   Breast Cancer Screening - Mammogram  Physical Annual Wellness Visit    Action(s) Taken:   Schedule a Annual Wellness Visit    Type of outreach:    Sent [employer] message.    Questions for provider review:    None         Sheng Vargas MA  Chart routed to None.

## 2025-04-28 NOTE — CONFIDENTIAL NOTE
Patient Quality Outreach    Patient is due for the following:   Physical Annual Wellness Visit    Action(s) Taken:   Schedule a Annual Wellness Visit    Type of outreach:    Sent letter.    Questions for provider review:    None         Sheng Vargas MA  Chart routed to .

## 2025-06-03 ENCOUNTER — PATIENT OUTREACH (OUTPATIENT)
Dept: CARE COORDINATION | Facility: CLINIC | Age: 70
End: 2025-06-03
Payer: COMMERCIAL

## (undated) DEVICE — LINEN ORTHO ACL PACK 5447

## (undated) DEVICE — ENDO FORCEP ENDOJAW BIOPSY 2.8MMX230CM FB-220U

## (undated) DEVICE — BAG CLEAR TRASH 1.3M 39X33" P4040C

## (undated) DEVICE — TOURNIQUET SGL  BLADDER 30"X4" BLUE 5921030135

## (undated) DEVICE — GLOVE BIOGEL PI SZ 7.5 40875

## (undated) DEVICE — Device

## (undated) DEVICE — PACK TOTAL KNEE BOXED LATEX FREE PO15TKFCT

## (undated) DEVICE — GLOVE BIOGEL PI MICRO INDICATOR UNDERGLOVE SZ 7.0 48970

## (undated) DEVICE — GLOVE BIOGEL PI MICRO INDICATOR UNDERGLOVE SZ 8.0 48980

## (undated) DEVICE — SOL NACL 0.9% IRRIG 3000ML BAG 2B7477

## (undated) DEVICE — LINEN DRAPE 54X72" 5467

## (undated) DEVICE — LINEN FULL SHEET 5511

## (undated) DEVICE — SU STRATAFIX PDS PLUS 1 CT-1 12" SXPP1A443

## (undated) DEVICE — DRAPE STERI U 1015

## (undated) DEVICE — GLOVE BIOGEL PI ULTRATOUCH SZ 8.0 41180

## (undated) DEVICE — SUTURE MONOCRYL+ 2-0 CT-1 36" UNDYED MCP945H

## (undated) DEVICE — ENDO FORCEP ENDOJAW BIOPSY 2.8MMX160CM FB-220K

## (undated) DEVICE — GLOVE BIOGEL PI SZ 7.0 40870

## (undated) DEVICE — PREP CHLORAPREP 26ML TINTED HI-LITE ORANGE 930815

## (undated) DEVICE — SUCTION MANIFOLD NEPTUNE 2 SYS 4 PORT 0702-020-000

## (undated) DEVICE — DRSG AQUACEL AG HYDROFIBER  3.5X10" 422605

## (undated) DEVICE — SUTURE VICRYL+ 0 CT-1 18" DYED VIO VCP740D

## (undated) DEVICE — CAST PADDING 6" STERILE 9046S

## (undated) RX ORDER — FENTANYL CITRATE 50 UG/ML
INJECTION, SOLUTION INTRAMUSCULAR; INTRAVENOUS
Status: DISPENSED
Start: 2023-05-08

## (undated) RX ORDER — METOPROLOL TARTRATE 50 MG
TABLET ORAL
Status: DISPENSED
Start: 2023-03-17

## (undated) RX ORDER — DEXAMETHASONE SODIUM PHOSPHATE 4 MG/ML
INJECTION, SOLUTION INTRA-ARTICULAR; INTRALESIONAL; INTRAMUSCULAR; INTRAVENOUS; SOFT TISSUE
Status: DISPENSED
Start: 2023-05-08

## (undated) RX ORDER — PROPOFOL 10 MG/ML
INJECTION, EMULSION INTRAVENOUS
Status: DISPENSED
Start: 2023-05-08

## (undated) RX ORDER — HYDROMORPHONE HCL IN WATER/PF 6 MG/30 ML
PATIENT CONTROLLED ANALGESIA SYRINGE INTRAVENOUS
Status: DISPENSED
Start: 2023-05-08

## (undated) RX ORDER — CEFAZOLIN SODIUM/WATER 2 G/20 ML
SYRINGE (ML) INTRAVENOUS
Status: DISPENSED
Start: 2023-05-08

## (undated) RX ORDER — LIDOCAINE HYDROCHLORIDE 10 MG/ML
INJECTION, SOLUTION EPIDURAL; INFILTRATION; INTRACAUDAL; PERINEURAL
Status: DISPENSED
Start: 2023-05-08

## (undated) RX ORDER — IVABRADINE 5 MG/1
TABLET, FILM COATED ORAL
Status: DISPENSED
Start: 2023-03-17

## (undated) RX ORDER — VANCOMYCIN HYDROCHLORIDE 1 G/20ML
INJECTION, POWDER, LYOPHILIZED, FOR SOLUTION INTRAVENOUS
Status: DISPENSED
Start: 2023-05-08

## (undated) RX ORDER — OXYCODONE HYDROCHLORIDE 5 MG/1
TABLET ORAL
Status: DISPENSED
Start: 2023-05-08

## (undated) RX ORDER — METOPROLOL TARTRATE 25 MG/1
TABLET, FILM COATED ORAL
Status: DISPENSED
Start: 2023-03-17

## (undated) RX ORDER — FENTANYL CITRATE 50 UG/ML
INJECTION, SOLUTION INTRAMUSCULAR; INTRAVENOUS
Status: DISPENSED
Start: 2024-02-07

## (undated) RX ORDER — ONDANSETRON 2 MG/ML
INJECTION INTRAMUSCULAR; INTRAVENOUS
Status: DISPENSED
Start: 2023-05-08

## (undated) RX ORDER — SIMETHICONE 40MG/0.6ML
SUSPENSION, DROPS(FINAL DOSAGE FORM)(ML) ORAL
Status: DISPENSED
Start: 2023-11-01

## (undated) RX ORDER — TRANEXAMIC ACID 650 MG/1
TABLET ORAL
Status: DISPENSED
Start: 2023-05-08

## (undated) RX ORDER — FENTANYL CITRATE 50 UG/ML
INJECTION, SOLUTION INTRAMUSCULAR; INTRAVENOUS
Status: DISPENSED
Start: 2023-11-01

## (undated) RX ORDER — ACETAMINOPHEN 325 MG/1
TABLET ORAL
Status: DISPENSED
Start: 2023-05-08